# Patient Record
Sex: FEMALE | Race: WHITE | NOT HISPANIC OR LATINO | Employment: UNEMPLOYED | URBAN - NONMETROPOLITAN AREA
[De-identification: names, ages, dates, MRNs, and addresses within clinical notes are randomized per-mention and may not be internally consistent; named-entity substitution may affect disease eponyms.]

---

## 2023-09-14 ENCOUNTER — HOSPITAL ENCOUNTER (EMERGENCY)
Facility: HOSPITAL | Age: 43
Discharge: HOME | End: 2023-09-14
Payer: COMMERCIAL

## 2023-09-14 VITALS
OXYGEN SATURATION: 97 % | TEMPERATURE: 98 F | BODY MASS INDEX: 35 KG/M2 | RESPIRATION RATE: 18 BRPM | SYSTOLIC BLOOD PRESSURE: 132 MMHG | DIASTOLIC BLOOD PRESSURE: 81 MMHG | HEIGHT: 64 IN | WEIGHT: 205 LBS | HEART RATE: 83 BPM

## 2023-09-14 DIAGNOSIS — R10.9 ABDOMINAL PAIN, UNSPECIFIED ABDOMINAL LOCATION: Primary | ICD-10-CM

## 2023-09-14 LAB
BACTERIA #/AREA URNS HPF: ABNORMAL /HPF
BILIRUB UR QL STRIP: NEGATIVE
CLARITY UR: CLEAR
COARSE GRAN CASTS #/AREA URNS LPF: ABNORMAL /LPF
COLOR UR: YELLOW
GLUCOSE UR STRIP-MCNC: >=1000 MG/DL
HCG UR QL IA.RAPID: NEGATIVE
KETONES UR STRIP-SCNC: 15 MG/DL
LEUKOCYTE ESTERASE UR QL STRIP: NEGATIVE
NITRITE UR QL STRIP: NEGATIVE
PH UR STRIP: 6 PH UNITS
PROT UR QL STRIP: NEGATIVE
RBC # UR STRIP: ABNORMAL /UL
RBC #/AREA URNS HPF: ABNORMAL /HPF
SP GR UR STRIP: 1.02
SQUAMOUS #/AREA URNS LPF: ABNORMAL /LPF
UROBILINOGEN UR STRIP-ACNC: 0.2 MG/DL
WBC #/AREA URNS HPF: ABNORMAL /HPF
YEAST #/AREA URNS HPF: ABNORMAL /HPF

## 2023-09-14 PROCEDURE — 99282 EMERGENCY DEPT VISIT SF MDM: CPT

## 2023-09-14 PROCEDURE — 81025 URINE PREGNANCY TEST: CPT | Performed by: REGISTERED NURSE

## 2023-09-14 PROCEDURE — 81001 URINALYSIS AUTO W/SCOPE: CPT | Performed by: REGISTERED NURSE

## 2023-09-14 PROCEDURE — 99283 EMERGENCY DEPT VISIT LOW MDM: CPT | Mod: ,,, | Performed by: REGISTERED NURSE

## 2023-09-14 PROCEDURE — 99283 PR EMERGENCY DEPT VISIT,LEVEL III: ICD-10-PCS | Mod: ,,, | Performed by: REGISTERED NURSE

## 2023-09-14 RX ORDER — MINERAL OIL
180 ENEMA (ML) RECTAL DAILY
COMMUNITY
End: 2024-01-22 | Stop reason: SDUPTHER

## 2023-09-14 RX ORDER — AMITRIPTYLINE HYDROCHLORIDE 10 MG/1
10 TABLET, FILM COATED ORAL NIGHTLY
COMMUNITY
End: 2024-01-22 | Stop reason: SDUPTHER

## 2023-09-14 RX ORDER — BUDESONIDE AND FORMOTEROL FUMARATE DIHYDRATE 160; 4.5 UG/1; UG/1
2 AEROSOL RESPIRATORY (INHALATION) EVERY 12 HOURS
COMMUNITY

## 2023-09-14 RX ORDER — GABAPENTIN 400 MG/1
400 CAPSULE ORAL 3 TIMES DAILY
COMMUNITY
End: 2024-01-22 | Stop reason: SDUPTHER

## 2023-09-14 RX ORDER — ESOMEPRAZOLE MAGNESIUM 40 MG/1
40 GRANULE, DELAYED RELEASE ORAL
COMMUNITY
End: 2024-01-22 | Stop reason: SDUPTHER

## 2023-09-14 RX ORDER — MONTELUKAST SODIUM 10 MG/1
10 TABLET ORAL NIGHTLY
COMMUNITY
End: 2024-01-22 | Stop reason: SDUPTHER

## 2023-09-14 RX ORDER — ATORVASTATIN CALCIUM 10 MG/1
10 TABLET, FILM COATED ORAL DAILY
COMMUNITY
End: 2024-01-22 | Stop reason: SDUPTHER

## 2023-09-14 RX ORDER — LISINOPRIL 5 MG/1
5 TABLET ORAL DAILY
COMMUNITY
End: 2024-01-22 | Stop reason: SDUPTHER

## 2023-09-14 RX ORDER — ESCITALOPRAM OXALATE 20 MG/1
20 TABLET ORAL DAILY
COMMUNITY
End: 2024-01-22 | Stop reason: SDUPTHER

## 2023-09-14 RX ORDER — INSULIN ASPART 100 [IU]/ML
20 INJECTION, SUSPENSION SUBCUTANEOUS
COMMUNITY

## 2023-09-14 RX ORDER — FAMOTIDINE 40 MG/1
40 TABLET, FILM COATED ORAL DAILY
COMMUNITY
End: 2024-01-22 | Stop reason: SDUPTHER

## 2023-09-14 RX ORDER — CETIRIZINE HYDROCHLORIDE 10 MG/1
10 TABLET ORAL DAILY
COMMUNITY
End: 2024-01-22 | Stop reason: SDUPTHER

## 2023-09-14 RX ORDER — SEMAGLUTIDE 1.34 MG/ML
INJECTION, SOLUTION SUBCUTANEOUS
COMMUNITY
End: 2024-01-23

## 2023-09-14 NOTE — ED PROVIDER NOTES
Encounter Date: 2023       History     Chief Complaint   Patient presents with    Abdominal Pain     43 y-old  female received to ED with complaint of pelvic pain that started at around 1530. Patient states that her pain is now resolved. No problems with bowel or bladder. No other complaints voiced.       Review of patient's allergies indicates:   Allergen Reactions    Amoxicillin     Bactrim [sulfamethoxazole-trimethoprim]     Clindamycin     Opioids - morphine analogues     Penicillins     Toradol [ketorolac]      Past Medical History:   Diagnosis Date    Asthma     Diabetes mellitus     Hypertension     Scoliosis      Past Surgical History:   Procedure Laterality Date     SECTION      HYSTERECTOMY       History reviewed. No pertinent family history.  Social History     Tobacco Use    Smoking status: Every Day     Types: Vaping with nicotine    Smokeless tobacco: Never   Substance Use Topics    Alcohol use: Not Currently    Drug use: Never     Review of Systems   Constitutional: Negative.    HENT: Negative.     Eyes: Negative.    Respiratory: Negative.     Cardiovascular: Negative.    Gastrointestinal: Negative.    Endocrine: Negative.    Genitourinary: Negative.    Musculoskeletal: Negative.    Skin: Negative.    Allergic/Immunologic: Negative.    Neurological: Negative.    Hematological: Negative.    Psychiatric/Behavioral: Negative.         Physical Exam     Initial Vitals [23 1710]   BP Pulse Resp Temp SpO2   132/81 83 18 98.4 °F (36.9 °C) 97 %      MAP       --         Physical Exam    Nursing note and vitals reviewed.  Constitutional: She appears well-developed and well-nourished.   HENT:   Head: Normocephalic and atraumatic.   Right Ear: External ear normal.   Left Ear: External ear normal.   Nose: Nose normal.   Mouth/Throat: Oropharynx is clear and moist. No oropharyngeal exudate.   Chronic rupture or right TM. States that she has intermittent drainage.    Eyes: Conjunctivae  are normal.   Neck: Neck supple.   Normal range of motion.  Cardiovascular:  Normal rate, regular rhythm, normal heart sounds and intact distal pulses.           Pulmonary/Chest: Breath sounds normal.   Abdominal: Abdomen is soft. Bowel sounds are normal.   Musculoskeletal:         General: Normal range of motion.      Cervical back: Normal range of motion and neck supple.     Neurological: She is alert and oriented to person, place, and time. She has normal strength. GCS score is 15. GCS eye subscore is 4. GCS verbal subscore is 5. GCS motor subscore is 6.   Skin: Skin is warm and dry. Capillary refill takes less than 2 seconds.   Psychiatric: She has a normal mood and affect. Her behavior is normal. Judgment and thought content normal.         Medical Screening Exam   See Full Note    ED Course   Procedures  Labs Reviewed   URINALYSIS, REFLEX TO URINE CULTURE - Abnormal; Notable for the following components:       Result Value    Glucose, UA >=1000 (*)     Ketones, UA 15 (*)     Blood, UA Trace-Intact (*)     All other components within normal limits   HCG QUALITATIVE URINE   URINALYSIS, MICROSCOPIC          Imaging Results    None          Medications - No data to display  Medical Decision Making  43 y-old  female received to ED with complaint of pelvic pain that started at around 1530. Patient states that her pain is now resolved. No problems with bowel or bladder. No other complaints voiced.       Risk  Risk Details: Patient is pain free on exam. States that her pain resolved before arriving to the ED.                                Clinical Impression:   Final diagnoses:  [R10.9] Abdominal pain, unspecified abdominal location (Primary)        ED Disposition Condition    Discharge Stable          ED Prescriptions    None       Follow-up Information       Follow up With Specialties Details Why Contact Info    Your Regular Doctor  Schedule an appointment as soon as possible for a visit in 2 days As  Nadir Stephenson, SCOTT-C  09/14/23 1835

## 2023-09-14 NOTE — ED TRIAGE NOTES
Presents with c/o lower abdominal pain that started about 3:30.  Pain from bilateral flanks around to pelvis.  Patient states pain was burning like right after you have surgery and stand up.   came in from work and brought her here.  States pain completely stopped while in waiting area about 10-15 minutes ago.

## 2023-09-18 ENCOUNTER — TELEPHONE (OUTPATIENT)
Dept: EMERGENCY MEDICINE | Facility: HOSPITAL | Age: 43
End: 2023-09-18
Payer: COMMERCIAL

## 2023-12-30 ENCOUNTER — HOSPITAL ENCOUNTER (EMERGENCY)
Facility: HOSPITAL | Age: 43
Discharge: HOME | End: 2023-12-30
Attending: EMERGENCY MEDICINE
Payer: COMMERCIAL

## 2023-12-30 VITALS
HEART RATE: 90 BPM | RESPIRATION RATE: 18 BRPM | BODY MASS INDEX: 32.82 KG/M2 | SYSTOLIC BLOOD PRESSURE: 125 MMHG | OXYGEN SATURATION: 98 % | TEMPERATURE: 98 F | HEIGHT: 65 IN | WEIGHT: 197 LBS | DIASTOLIC BLOOD PRESSURE: 85 MMHG

## 2023-12-30 DIAGNOSIS — T78.40XA ALLERGIC REACTION, INITIAL ENCOUNTER: Primary | ICD-10-CM

## 2023-12-30 LAB
BASOPHILS # BLD AUTO: 0.02 K/UL (ref 0–0.2)
BASOPHILS NFR BLD AUTO: 0.2 % (ref 0–1)
DIFFERENTIAL METHOD BLD: ABNORMAL
EOSINOPHIL # BLD AUTO: 0.02 K/UL (ref 0–0.5)
EOSINOPHIL NFR BLD AUTO: 0.2 % (ref 1–4)
ERYTHROCYTE [DISTWIDTH] IN BLOOD BY AUTOMATED COUNT: 12.6 % (ref 11.5–14.5)
HCT VFR BLD AUTO: 48.5 % (ref 38–47)
HGB BLD-MCNC: 16.6 G/DL (ref 12–16)
IMM GRANULOCYTES # BLD AUTO: 0.04 K/UL (ref 0–0.04)
IMM GRANULOCYTES NFR BLD: 0.4 % (ref 0–0.4)
LYMPHOCYTES # BLD AUTO: 2.35 K/UL (ref 1–4.8)
LYMPHOCYTES NFR BLD AUTO: 25.7 % (ref 27–41)
MCH RBC QN AUTO: 30.9 PG (ref 27–31)
MCHC RBC AUTO-ENTMCNC: 34.2 G/DL (ref 32–36)
MCV RBC AUTO: 90.3 FL (ref 80–96)
MONOCYTES # BLD AUTO: 0.58 K/UL (ref 0–0.8)
MONOCYTES NFR BLD AUTO: 6.3 % (ref 2–6)
MPC BLD CALC-MCNC: 10 FL (ref 9.4–12.4)
NEUTROPHILS # BLD AUTO: 6.15 K/UL (ref 1.8–7.7)
NEUTROPHILS NFR BLD AUTO: 67.2 % (ref 53–65)
NRBC # BLD AUTO: 0 X10E3/UL
NRBC, AUTO (.00): 0 %
PLATELET # BLD AUTO: 408 K/UL (ref 150–400)
RBC # BLD AUTO: 5.37 M/UL (ref 4.2–5.4)
WBC # BLD AUTO: 9.16 K/UL (ref 4.5–11)

## 2023-12-30 PROCEDURE — 96374 THER/PROPH/DIAG INJ IV PUSH: CPT

## 2023-12-30 PROCEDURE — 96375 TX/PRO/DX INJ NEW DRUG ADDON: CPT

## 2023-12-30 PROCEDURE — 63600175 PHARM REV CODE 636 W HCPCS: Performed by: EMERGENCY MEDICINE

## 2023-12-30 PROCEDURE — 99284 EMERGENCY DEPT VISIT MOD MDM: CPT | Mod: ,,, | Performed by: EMERGENCY MEDICINE

## 2023-12-30 PROCEDURE — 85025 COMPLETE CBC W/AUTO DIFF WBC: CPT | Performed by: EMERGENCY MEDICINE

## 2023-12-30 PROCEDURE — 99284 EMERGENCY DEPT VISIT MOD MDM: CPT | Mod: 25

## 2023-12-30 PROCEDURE — 25000003 PHARM REV CODE 250: Performed by: EMERGENCY MEDICINE

## 2023-12-30 RX ORDER — DIPHENHYDRAMINE HYDROCHLORIDE 50 MG/ML
25 INJECTION, SOLUTION INTRAMUSCULAR; INTRAVENOUS
Status: COMPLETED | OUTPATIENT
Start: 2023-12-30 | End: 2023-12-30

## 2023-12-30 RX ORDER — METHYLPREDNISOLONE SOD SUCC 125 MG
125 VIAL (EA) INJECTION
Status: COMPLETED | OUTPATIENT
Start: 2023-12-30 | End: 2023-12-30

## 2023-12-30 RX ORDER — HYDROXYZINE HYDROCHLORIDE 25 MG/1
25 TABLET, FILM COATED ORAL EVERY 6 HOURS PRN
Qty: 30 TABLET | Refills: 1 | Status: SHIPPED | OUTPATIENT
Start: 2023-12-30 | End: 2024-01-22

## 2023-12-30 RX ORDER — FAMOTIDINE 10 MG/ML
20 INJECTION INTRAVENOUS
Status: COMPLETED | OUTPATIENT
Start: 2023-12-30 | End: 2023-12-30

## 2023-12-30 RX ORDER — PREDNISONE 20 MG/1
TABLET ORAL
Qty: 12 TABLET | Refills: 0 | Status: SHIPPED | OUTPATIENT
Start: 2023-12-30 | End: 2024-01-22

## 2023-12-30 RX ADMIN — DIPHENHYDRAMINE HYDROCHLORIDE 25 MG: 50 INJECTION INTRAMUSCULAR; INTRAVENOUS at 12:12

## 2023-12-30 RX ADMIN — FAMOTIDINE 20 MG: 10 INJECTION, SOLUTION INTRAVENOUS at 12:12

## 2023-12-30 RX ADMIN — METHYLPREDNISOLONE SODIUM SUCCINATE 125 MG: 125 INJECTION, POWDER, FOR SOLUTION INTRAMUSCULAR; INTRAVENOUS at 12:12

## 2023-12-30 NOTE — ED PROVIDER NOTES
Encounter Date: 2023       History     Chief Complaint   Patient presents with    Rash     42 y/o female with generalized urticaria x 2 - 3 days.  She has had this happen similarly in the past.  She notes no remitting or exacerbating factors.  She says she was out of her allegra, but her  went and got her some.  She says the allegra helped very little.        Review of patient's allergies indicates:   Allergen Reactions    Amoxicillin     Bactrim [sulfamethoxazole-trimethoprim]     Clindamycin     Opioids - morphine analogues     Penicillins     Toradol [ketorolac]      Past Medical History:   Diagnosis Date    Asthma     Diabetes mellitus     Hypertension     Scoliosis      Past Surgical History:   Procedure Laterality Date     SECTION      HYSTERECTOMY       No family history on file.  Social History     Tobacco Use    Smoking status: Every Day     Types: Vaping with nicotine    Smokeless tobacco: Never   Substance Use Topics    Alcohol use: Not Currently    Drug use: Never     Review of Systems   All other systems reviewed and are negative.      Physical Exam     Initial Vitals [23 0027]   BP Pulse Resp Temp SpO2   (!) 141/79 (!) 123 20 97.7 °F (36.5 °C) 99 %      MAP       --         Physical Exam    Nursing note and vitals reviewed.  Constitutional: She appears well-developed and well-nourished.   HENT:   Head: Normocephalic and atraumatic.   Nose: Nose normal.   Mouth/Throat: Oropharynx is clear and moist.   Eyes: Conjunctivae and EOM are normal. Pupils are equal, round, and reactive to light.   Neck: Neck supple.   Normal range of motion.  Cardiovascular:  Regular rhythm and normal heart sounds.           Mildly tachycardic     Pulmonary/Chest: Breath sounds normal.   Abdominal: Abdomen is soft. Bowel sounds are normal.   Musculoskeletal:         General: Normal range of motion.      Cervical back: Normal range of motion and neck supple.     Neurological: She is alert and oriented  to person, place, and time. She has normal strength. GCS score is 15. GCS eye subscore is 4. GCS verbal subscore is 5. GCS motor subscore is 6.   Skin: Capillary refill takes less than 2 seconds.   Large and diffuse generalized urticaria.     Psychiatric: She has a normal mood and affect.         Medical Screening Exam   See Full Note    ED Course   Procedures  Labs Reviewed   CBC WITH DIFFERENTIAL - Abnormal; Notable for the following components:       Result Value    Hemoglobin 16.6 (*)     Hematocrit 48.5 (*)     Platelet Count 408 (*)     Neutrophils % 67.2 (*)     Lymphocytes % 25.7 (*)     Monocytes % 6.3 (*)     Eosinophils % 0.2 (*)     All other components within normal limits   CBC W/ AUTO DIFFERENTIAL    Narrative:     The following orders were created for panel order CBC auto differential.  Procedure                               Abnormality         Status                     ---------                               -----------         ------                     CBC with Differential[[id number]]       Abnormal            Final result                 Please view results for these tests on the individual orders.          Imaging Results    None          Medications   methylPREDNISolone sodium succinate injection 125 mg (125 mg Intravenous Given 12/30/23 0056)   diphenhydrAMINE injection 25 mg (25 mg Intravenous Given 12/30/23 0056)   famotidine (PF) injection 20 mg (20 mg Intravenous Given 12/30/23 0045)     Medical Decision Making  Pruritic urticaria    Ddx:  allergic reaction vs vasculitis vs other    IMPROVED WITH GLUCOCORTICOIDS AND ANTIHISTAMINE.  OUTPATIENT STEROID TAPER AND ANTIHISTAMINE.      Amount and/or Complexity of Data Reviewed  Labs: ordered. Decision-making details documented in ED Course.    Risk  Prescription drug management.                                      Clinical Impression:   Final diagnoses:  [T78.40XA] Allergic reaction, initial encounter (Primary)        ED Disposition  Condition    Discharge Stable          ED Prescriptions    None       Follow-up Information    None          Raphael Syed MD  12/30/23 [id number]

## 2023-12-30 NOTE — DISCHARGE INSTRUCTIONS
TAKE PREDNISONE AND HYDROXYZINE AS DIRECTED.  FOLLOW UP WITH YOUR PRIMARY CARE PROVIDER OR RETURN TO THE EMERGENCY DEPARTMENT IF SYMPTOMS PERSIST OR WORSEN OR OTHERWISE AS NEEDED.

## 2024-01-22 ENCOUNTER — OFFICE VISIT (OUTPATIENT)
Dept: FAMILY MEDICINE | Facility: CLINIC | Age: 44
End: 2024-01-22
Payer: COMMERCIAL

## 2024-01-22 VITALS
WEIGHT: 205.19 LBS | OXYGEN SATURATION: 97 % | RESPIRATION RATE: 18 BRPM | BODY MASS INDEX: 34.19 KG/M2 | DIASTOLIC BLOOD PRESSURE: 84 MMHG | HEART RATE: 103 BPM | SYSTOLIC BLOOD PRESSURE: 125 MMHG | TEMPERATURE: 98 F | HEIGHT: 65 IN

## 2024-01-22 DIAGNOSIS — G43.909 MIGRAINE WITHOUT STATUS MIGRAINOSUS, NOT INTRACTABLE, UNSPECIFIED MIGRAINE TYPE: ICD-10-CM

## 2024-01-22 DIAGNOSIS — K76.0 FATTY LIVER: ICD-10-CM

## 2024-01-22 DIAGNOSIS — Z91.09 ENVIRONMENTAL ALLERGIES: ICD-10-CM

## 2024-01-22 DIAGNOSIS — Z91.018 ALLERGY TO ALPHA-GAL: ICD-10-CM

## 2024-01-22 DIAGNOSIS — G89.29 CHRONIC MIDLINE LOW BACK PAIN WITH BILATERAL SCIATICA: ICD-10-CM

## 2024-01-22 DIAGNOSIS — E11.65 TYPE 2 DIABETES MELLITUS WITH HYPERGLYCEMIA, UNSPECIFIED WHETHER LONG TERM INSULIN USE: Primary | ICD-10-CM

## 2024-01-22 DIAGNOSIS — Z11.4 ENCOUNTER FOR SCREENING FOR HIV: ICD-10-CM

## 2024-01-22 DIAGNOSIS — F41.9 ANXIETY AND DEPRESSION: ICD-10-CM

## 2024-01-22 DIAGNOSIS — M54.41 CHRONIC MIDLINE LOW BACK PAIN WITH BILATERAL SCIATICA: ICD-10-CM

## 2024-01-22 DIAGNOSIS — M54.42 CHRONIC MIDLINE LOW BACK PAIN WITH BILATERAL SCIATICA: ICD-10-CM

## 2024-01-22 DIAGNOSIS — Z11.59 NEED FOR HEPATITIS C SCREENING TEST: ICD-10-CM

## 2024-01-22 DIAGNOSIS — K21.9 GASTROESOPHAGEAL REFLUX DISEASE, UNSPECIFIED WHETHER ESOPHAGITIS PRESENT: ICD-10-CM

## 2024-01-22 DIAGNOSIS — Z85.41 HISTORY OF CERVICAL CANCER: ICD-10-CM

## 2024-01-22 DIAGNOSIS — E04.9 ENLARGED THYROID: ICD-10-CM

## 2024-01-22 DIAGNOSIS — Z12.31 ENCOUNTER FOR SCREENING MAMMOGRAM FOR MALIGNANT NEOPLASM OF BREAST: ICD-10-CM

## 2024-01-22 DIAGNOSIS — F32.A ANXIETY AND DEPRESSION: ICD-10-CM

## 2024-01-22 DIAGNOSIS — Z87.11 HISTORY OF GASTRIC ULCER: ICD-10-CM

## 2024-01-22 PROBLEM — E11.9 DMII (DIABETES MELLITUS, TYPE 2): Status: ACTIVE | Noted: 2024-01-22

## 2024-01-22 LAB
ALBUMIN SERPL BCP-MCNC: 3.5 G/DL (ref 3.5–5)
ALBUMIN/GLOB SERPL: 0.7 {RATIO}
ALP SERPL-CCNC: 124 U/L (ref 37–98)
ALT SERPL W P-5'-P-CCNC: 38 U/L (ref 13–56)
ANION GAP SERPL CALCULATED.3IONS-SCNC: 13 MMOL/L (ref 7–16)
AST SERPL W P-5'-P-CCNC: 19 U/L (ref 15–37)
BASOPHILS # BLD AUTO: 0.01 K/UL (ref 0–0.2)
BASOPHILS NFR BLD AUTO: 0.1 % (ref 0–1)
BILIRUB SERPL-MCNC: 0.4 MG/DL (ref ?–1.2)
BUN SERPL-MCNC: 16 MG/DL (ref 7–18)
BUN/CREAT SERPL: 20 (ref 6–20)
CALCIUM SERPL-MCNC: 9.6 MG/DL (ref 8.5–10.1)
CHLORIDE SERPL-SCNC: 97 MMOL/L (ref 98–107)
CHOLEST SERPL-MCNC: 236 MG/DL (ref 0–200)
CHOLEST/HDLC SERPL: 5.9 {RATIO}
CO2 SERPL-SCNC: 25 MMOL/L (ref 21–32)
CREAT SERPL-MCNC: 0.82 MG/DL (ref 0.55–1.02)
CREAT UR-MCNC: 50 MG/DL (ref 28–219)
DIFFERENTIAL METHOD BLD: ABNORMAL
EGFR (NO RACE VARIABLE) (RUSH/TITUS): 92 ML/MIN/1.73M2
EOSINOPHIL # BLD AUTO: 0.11 K/UL (ref 0–0.5)
EOSINOPHIL NFR BLD AUTO: 1.2 % (ref 1–4)
ERYTHROCYTE [DISTWIDTH] IN BLOOD BY AUTOMATED COUNT: 12.1 % (ref 11.5–14.5)
EST. AVERAGE GLUCOSE BLD GHB EST-MCNC: 338 MG/DL
GLOBULIN SER-MCNC: 5 G/DL (ref 2–4)
GLUCOSE SERPL-MCNC: 363 MG/DL (ref 74–106)
HBA1C MFR BLD HPLC: 13.4 % (ref 4.5–6.6)
HCT VFR BLD AUTO: 46.9 % (ref 38–47)
HDLC SERPL-MCNC: 40 MG/DL (ref 40–60)
HGB BLD-MCNC: 16.1 G/DL (ref 12–16)
IMM GRANULOCYTES # BLD AUTO: 0.03 K/UL (ref 0–0.04)
IMM GRANULOCYTES NFR BLD: 0.3 % (ref 0–0.4)
LDLC SERPL CALC-MCNC: 123 MG/DL
LDLC/HDLC SERPL: 3.1 {RATIO}
LYMPHOCYTES # BLD AUTO: 2.6 K/UL (ref 1–4.8)
LYMPHOCYTES NFR BLD AUTO: 29.1 % (ref 27–41)
MCH RBC QN AUTO: 30.7 PG (ref 27–31)
MCHC RBC AUTO-ENTMCNC: 34.3 G/DL (ref 32–36)
MCV RBC AUTO: 89.3 FL (ref 80–96)
MICROALBUMIN UR-MCNC: 4.1 MG/DL (ref 0–2.8)
MICROALBUMIN/CREAT RATIO PNL UR: 82 MG/G (ref 0–30)
MONOCYTES # BLD AUTO: 0.56 K/UL (ref 0–0.8)
MONOCYTES NFR BLD AUTO: 6.3 % (ref 2–6)
MPC BLD CALC-MCNC: 10.3 FL (ref 9.4–12.4)
NEUTROPHILS # BLD AUTO: 5.61 K/UL (ref 1.8–7.7)
NEUTROPHILS NFR BLD AUTO: 63 % (ref 53–65)
NONHDLC SERPL-MCNC: 196 MG/DL
NRBC # BLD AUTO: 0 X10E3/UL
NRBC, AUTO (.00): 0 %
PLATELET # BLD AUTO: 447 K/UL (ref 150–400)
POTASSIUM SERPL-SCNC: 4.1 MMOL/L (ref 3.5–5.1)
PROT SERPL-MCNC: 8.5 G/DL (ref 6.4–8.2)
RBC # BLD AUTO: 5.25 M/UL (ref 4.2–5.4)
SODIUM SERPL-SCNC: 131 MMOL/L (ref 136–145)
TRIGL SERPL-MCNC: 367 MG/DL (ref 35–150)
TSH SERPL DL<=0.005 MIU/L-ACNC: 2.08 UIU/ML (ref 0.36–3.74)
VLDLC SERPL-MCNC: 73 MG/DL
WBC # BLD AUTO: 8.92 K/UL (ref 4.5–11)

## 2024-01-22 PROCEDURE — 82043 UR ALBUMIN QUANTITATIVE: CPT | Mod: ,,, | Performed by: CLINICAL MEDICAL LABORATORY

## 2024-01-22 PROCEDURE — 99204 OFFICE O/P NEW MOD 45 MIN: CPT | Mod: ,,, | Performed by: STUDENT IN AN ORGANIZED HEALTH CARE EDUCATION/TRAINING PROGRAM

## 2024-01-22 PROCEDURE — 83036 HEMOGLOBIN GLYCOSYLATED A1C: CPT | Mod: ,,, | Performed by: CLINICAL MEDICAL LABORATORY

## 2024-01-22 PROCEDURE — 1159F MED LIST DOCD IN RCRD: CPT | Mod: CPTII,,, | Performed by: STUDENT IN AN ORGANIZED HEALTH CARE EDUCATION/TRAINING PROGRAM

## 2024-01-22 PROCEDURE — 3046F HEMOGLOBIN A1C LEVEL >9.0%: CPT | Mod: CPTII,,, | Performed by: STUDENT IN AN ORGANIZED HEALTH CARE EDUCATION/TRAINING PROGRAM

## 2024-01-22 PROCEDURE — 86803 HEPATITIS C AB TEST: CPT | Mod: ,,, | Performed by: CLINICAL MEDICAL LABORATORY

## 2024-01-22 PROCEDURE — 87389 HIV-1 AG W/HIV-1&-2 AB AG IA: CPT | Mod: ,,, | Performed by: CLINICAL MEDICAL LABORATORY

## 2024-01-22 PROCEDURE — 3074F SYST BP LT 130 MM HG: CPT | Mod: CPTII,,, | Performed by: STUDENT IN AN ORGANIZED HEALTH CARE EDUCATION/TRAINING PROGRAM

## 2024-01-22 PROCEDURE — 82570 ASSAY OF URINE CREATININE: CPT | Mod: ,,, | Performed by: CLINICAL MEDICAL LABORATORY

## 2024-01-22 PROCEDURE — 3079F DIAST BP 80-89 MM HG: CPT | Mod: CPTII,,, | Performed by: STUDENT IN AN ORGANIZED HEALTH CARE EDUCATION/TRAINING PROGRAM

## 2024-01-22 PROCEDURE — 80061 LIPID PANEL: CPT | Mod: ,,, | Performed by: CLINICAL MEDICAL LABORATORY

## 2024-01-22 PROCEDURE — 80050 GENERAL HEALTH PANEL: CPT | Mod: ,,, | Performed by: CLINICAL MEDICAL LABORATORY

## 2024-01-22 PROCEDURE — 3008F BODY MASS INDEX DOCD: CPT | Mod: CPTII,,, | Performed by: STUDENT IN AN ORGANIZED HEALTH CARE EDUCATION/TRAINING PROGRAM

## 2024-01-22 RX ORDER — GABAPENTIN 400 MG/1
400 CAPSULE ORAL 3 TIMES DAILY
Qty: 270 CAPSULE | Refills: 0 | Status: SHIPPED | OUTPATIENT
Start: 2024-01-22 | End: 2024-04-21

## 2024-01-22 RX ORDER — ESOMEPRAZOLE MAGNESIUM 40 MG/1
40 GRANULE, DELAYED RELEASE ORAL
Qty: 90 EACH | Refills: 1 | Status: SHIPPED | OUTPATIENT
Start: 2024-01-22

## 2024-01-22 RX ORDER — IBUPROFEN 800 MG/1
800 TABLET ORAL EVERY 8 HOURS PRN
COMMUNITY
End: 2024-01-22

## 2024-01-22 RX ORDER — AMITRIPTYLINE HYDROCHLORIDE 10 MG/1
10 TABLET, FILM COATED ORAL NIGHTLY
Qty: 90 TABLET | Refills: 0 | Status: SHIPPED | OUTPATIENT
Start: 2024-01-22

## 2024-01-22 RX ORDER — MONTELUKAST SODIUM 10 MG/1
10 TABLET ORAL NIGHTLY
Qty: 90 TABLET | Refills: 0 | Status: SHIPPED | OUTPATIENT
Start: 2024-01-22

## 2024-01-22 RX ORDER — ATORVASTATIN CALCIUM 10 MG/1
10 TABLET, FILM COATED ORAL DAILY
Qty: 90 TABLET | Refills: 0 | Status: SHIPPED | OUTPATIENT
Start: 2024-01-22

## 2024-01-22 RX ORDER — CETIRIZINE HYDROCHLORIDE 10 MG/1
10 TABLET ORAL DAILY
Qty: 90 TABLET | Refills: 0 | Status: SHIPPED | OUTPATIENT
Start: 2024-01-22

## 2024-01-22 RX ORDER — GABAPENTIN 400 MG/1
400 CAPSULE ORAL 3 TIMES DAILY
Qty: 90 CAPSULE | Refills: 0 | Status: SHIPPED | OUTPATIENT
Start: 2024-01-22 | End: 2024-01-22 | Stop reason: SDUPTHER

## 2024-01-22 RX ORDER — MINERAL OIL
180 ENEMA (ML) RECTAL DAILY
Qty: 90 TABLET | Refills: 0 | Status: SHIPPED | OUTPATIENT
Start: 2024-01-22

## 2024-01-22 RX ORDER — FAMOTIDINE 40 MG/1
40 TABLET, FILM COATED ORAL DAILY
Qty: 90 TABLET | Refills: 1 | Status: SHIPPED | OUTPATIENT
Start: 2024-01-22

## 2024-01-22 RX ORDER — LISINOPRIL 5 MG/1
5 TABLET ORAL DAILY
Qty: 90 TABLET | Refills: 1 | Status: SHIPPED | OUTPATIENT
Start: 2024-01-22

## 2024-01-22 RX ORDER — ESCITALOPRAM OXALATE 20 MG/1
20 TABLET ORAL DAILY
Qty: 90 TABLET | Refills: 0 | Status: SHIPPED | OUTPATIENT
Start: 2024-01-22

## 2024-01-23 PROBLEM — Z91.018 ALLERGY TO ALPHA-GAL: Status: ACTIVE | Noted: 2024-01-23

## 2024-01-23 PROBLEM — M54.42 CHRONIC MIDLINE LOW BACK PAIN WITH BILATERAL SCIATICA: Status: ACTIVE | Noted: 2024-01-23

## 2024-01-23 PROBLEM — G89.29 CHRONIC MIDLINE LOW BACK PAIN WITH BILATERAL SCIATICA: Status: ACTIVE | Noted: 2024-01-23

## 2024-01-23 PROBLEM — K76.0 FATTY LIVER: Status: ACTIVE | Noted: 2024-01-23

## 2024-01-23 PROBLEM — K21.9 GASTROESOPHAGEAL REFLUX DISEASE: Status: ACTIVE | Noted: 2024-01-23

## 2024-01-23 PROBLEM — Z87.11 HISTORY OF GASTRIC ULCER: Status: ACTIVE | Noted: 2024-01-23

## 2024-01-23 PROBLEM — M54.41 CHRONIC MIDLINE LOW BACK PAIN WITH BILATERAL SCIATICA: Status: ACTIVE | Noted: 2024-01-23

## 2024-01-23 PROBLEM — G43.909 MIGRAINE WITHOUT STATUS MIGRAINOSUS, NOT INTRACTABLE: Status: ACTIVE | Noted: 2024-01-23

## 2024-01-23 PROBLEM — Z85.41 HISTORY OF CERVICAL CANCER: Status: ACTIVE | Noted: 2024-01-23

## 2024-01-23 LAB
HCV AB SER QL: NORMAL
HIV 1+O+2 AB SERPL QL: NORMAL

## 2024-01-23 RX ORDER — SEMAGLUTIDE 0.68 MG/ML
0.5 INJECTION, SOLUTION SUBCUTANEOUS
Qty: 3 ML | Refills: 0 | Status: SHIPPED | OUTPATIENT
Start: 2024-01-23

## 2024-01-23 NOTE — PROGRESS NOTES
Called and discussed results with patient.   Informed patient about Ozempic and to start 0.5mg weekly. Patient voiced understanding.    [facility]

## 2024-05-01 LAB
LEFT EYE DM RETINOPATHY: POSITIVE
RIGHT EYE DM RETINOPATHY: POSITIVE

## 2024-07-09 ENCOUNTER — HOSPITAL ENCOUNTER (EMERGENCY)
Facility: HOSPITAL | Age: 44
Discharge: HOME | End: 2024-07-10
Payer: COMMERCIAL

## 2024-07-09 DIAGNOSIS — R22.0 LIP SWELLING: ICD-10-CM

## 2024-07-09 DIAGNOSIS — T78.3XXA ANGIOEDEMA, INITIAL ENCOUNTER: Primary | ICD-10-CM

## 2024-07-09 PROCEDURE — 99285 EMERGENCY DEPT VISIT HI MDM: CPT | Mod: ,,, | Performed by: NURSE PRACTITIONER

## 2024-07-09 PROCEDURE — 99285 EMERGENCY DEPT VISIT HI MDM: CPT | Mod: 25

## 2024-07-09 PROCEDURE — 96372 THER/PROPH/DIAG INJ SC/IM: CPT | Performed by: NURSE PRACTITIONER

## 2024-07-09 PROCEDURE — 63600175 PHARM REV CODE 636 W HCPCS: Performed by: NURSE PRACTITIONER

## 2024-07-09 RX ORDER — EPINEPHRINE 1 MG/ML
0.3 INJECTION, SOLUTION, CONCENTRATE INTRAVENOUS ONCE
Status: COMPLETED | OUTPATIENT
Start: 2024-07-09 | End: 2024-07-09

## 2024-07-09 RX ORDER — DEXAMETHASONE SODIUM PHOSPHATE 4 MG/ML
8 INJECTION, SOLUTION INTRA-ARTICULAR; INTRALESIONAL; INTRAMUSCULAR; INTRAVENOUS; SOFT TISSUE
Status: COMPLETED | OUTPATIENT
Start: 2024-07-09 | End: 2024-07-09

## 2024-07-09 RX ORDER — METHYLPREDNISOLONE ACETATE 40 MG/ML
80 INJECTION, SUSPENSION INTRA-ARTICULAR; INTRALESIONAL; INTRAMUSCULAR; SOFT TISSUE
Status: COMPLETED | OUTPATIENT
Start: 2024-07-09 | End: 2024-07-09

## 2024-07-09 RX ADMIN — DEXAMETHASONE SODIUM PHOSPHATE 8 MG: 4 INJECTION, SOLUTION INTRA-ARTICULAR; INTRALESIONAL; INTRAMUSCULAR; INTRAVENOUS; SOFT TISSUE at 11:07

## 2024-07-09 RX ADMIN — METHYLPREDNISOLONE ACETATE 80 MG: 40 INJECTION, SUSPENSION INTRA-ARTICULAR; INTRALESIONAL; INTRAMUSCULAR; SOFT TISSUE at 11:07

## 2024-07-09 RX ADMIN — EPINEPHRINE 0.3 MG: 1 INJECTION, SOLUTION, CONCENTRATE INTRAVENOUS at 11:07

## 2024-07-10 VITALS
BODY MASS INDEX: 33.8 KG/M2 | RESPIRATION RATE: 18 BRPM | DIASTOLIC BLOOD PRESSURE: 65 MMHG | WEIGHT: 198 LBS | TEMPERATURE: 98 F | OXYGEN SATURATION: 98 % | SYSTOLIC BLOOD PRESSURE: 134 MMHG | HEIGHT: 64 IN | HEART RATE: 77 BPM

## 2024-07-10 NOTE — ED NOTES
Dc home with . Verbal and written dc instructions given.   Voiced understanding.   Ambulatory on dc

## 2024-07-10 NOTE — ED TRIAGE NOTES
43 year old female presents to ed with c/o lip swelling that started at appx one pm today.   Patient denies taking any medication for the swelling.   Patient reports multiple allergies but currently does not have an epi pen

## 2024-07-10 NOTE — ED PROVIDER NOTES
Encounter Date: 2024       History     Chief Complaint   Patient presents with    Oral Swelling     Began having swelling of lower lip about 1300 today    The history is provided by the patient. No  was used.     Review of patient's allergies indicates:   Allergen Reactions    Clindamycin Anaphylaxis    Penicillins Swelling    Amoxicillin     Bactrim [sulfamethoxazole-trimethoprim]     Cyclobenzaprine     Morphine     Opioids - morphine analogues     Promethazine     Toradol [ketorolac]     Tramadol      Past Medical History:   Diagnosis Date    Asthma     Diabetes mellitus     Hypertension     Neuropathy     Scoliosis     Unspecified osteoarthritis, unspecified site      Past Surgical History:   Procedure Laterality Date     SECTION      HYSTERECTOMY       No family history on file.  Social History     Tobacco Use    Smoking status: Former     Types: Cigarettes     Start date:      Passive exposure: Past    Smokeless tobacco: Never   Substance Use Topics    Alcohol use: Not Currently    Drug use: Never     Review of Systems   Constitutional:  Positive for appetite change.   HENT:  Positive for trouble swallowing.    Skin:  Positive for rash.        Has swelling of lower lip some throat swelling   All other systems reviewed and are negative.      Physical Exam     Initial Vitals [24 2220]   BP Pulse Resp Temp SpO2   (!) 151/91 83 18 97.7 °F (36.5 °C) 98 %      MAP       --         Physical Exam    Constitutional: She appears well-developed and well-nourished.   HENT:   Head: Normocephalic and atraumatic.   Right Ear: External ear normal.   Left Ear: External ear normal.   Nose: Nose normal.   Mouth/Throat: Oropharynx is clear and moist.   Eyes: EOM are normal. Pupils are equal, round, and reactive to light.   Neck: Neck supple.   Normal range of motion.  Cardiovascular:  Normal rate, regular rhythm, normal heart sounds and intact distal pulses.           Pulmonary/Chest:  Breath sounds normal.   Abdominal: Abdomen is soft. Bowel sounds are normal.   Musculoskeletal:         General: Normal range of motion.      Cervical back: Normal range of motion and neck supple.     Neurological: She is alert and oriented to person, place, and time.   Skin: Skin is warm and dry. Capillary refill takes less than 2 seconds.   Has lower lip swelling         Medical Screening Exam   See Full Note    ED Course   Procedures  Labs Reviewed - No data to display       Imaging Results    None          Medications   EPINEPHrine (PF) injection 0.3 mg (0.3 mg Subcutaneous Given 7/9/24 2305)   dexAMETHasone injection 8 mg (8 mg Intramuscular Given 7/9/24 2304)   methylPREDNISolone acetate injection 80 mg (80 mg Intramuscular Given 7/9/24 2303)     Medical Decision Making  Risk  Prescription drug management.               ED Course as of 07/09/24 2357 Tue Jul 09, 2024 2354 Has reduced lip swelling since medication given, discussed need to follow-up with regular provider for further evaluation [BP]      ED Course User Index  [BP] Mariano Kiser NP                           Clinical Impression:   Final diagnoses:  [T78.3XXA] Angioedema, initial encounter (Primary)  [R22.0] Lip swelling               Mariano Kiser NP  07/09/24 7709

## 2024-07-11 ENCOUNTER — TELEPHONE (OUTPATIENT)
Dept: EMERGENCY MEDICINE | Facility: HOSPITAL | Age: 44
End: 2024-07-11
Payer: COMMERCIAL

## 2024-08-01 ENCOUNTER — OFFICE VISIT (OUTPATIENT)
Dept: FAMILY MEDICINE | Facility: CLINIC | Age: 44
End: 2024-08-01
Payer: COMMERCIAL

## 2024-08-01 VITALS
HEIGHT: 64 IN | RESPIRATION RATE: 18 BRPM | OXYGEN SATURATION: 98 % | TEMPERATURE: 98 F | BODY MASS INDEX: 33.12 KG/M2 | HEART RATE: 87 BPM | SYSTOLIC BLOOD PRESSURE: 134 MMHG | WEIGHT: 194 LBS | DIASTOLIC BLOOD PRESSURE: 77 MMHG

## 2024-08-01 DIAGNOSIS — E11.9 TYPE 2 DIABETES MELLITUS WITHOUT COMPLICATION, UNSPECIFIED WHETHER LONG TERM INSULIN USE: Primary | ICD-10-CM

## 2024-08-01 DIAGNOSIS — L50.9 URTICARIA: ICD-10-CM

## 2024-08-01 LAB — GLUCOSE SERPL-MCNC: 225 MG/DL (ref 70–110)

## 2024-08-01 RX ORDER — PREDNISONE 20 MG/1
20 TABLET ORAL DAILY
Qty: 5 TABLET | Refills: 0 | Status: SHIPPED | OUTPATIENT
Start: 2024-08-01 | End: 2024-08-06

## 2024-08-01 RX ORDER — INSULIN ASPART 100 [IU]/ML
20 INJECTION, SUSPENSION SUBCUTANEOUS 2 TIMES DAILY
Qty: 12 ML | Refills: 2 | Status: SHIPPED | OUTPATIENT
Start: 2024-08-01 | End: 2024-10-30

## 2024-08-01 RX ORDER — HYDROXYZINE HYDROCHLORIDE 25 MG/1
25 TABLET, FILM COATED ORAL 3 TIMES DAILY
Qty: 42 TABLET | Refills: 0 | Status: SHIPPED | OUTPATIENT
Start: 2024-08-01 | End: 2024-08-15

## 2024-08-02 NOTE — PROGRESS NOTES
"  Subjective:       Patient ID: Latha Palm is a 44 y.o. female.    Chief Complaint: Rash (Room 3 patient c/o large red spots all over body for a month, patient stated her nerves and joints are  hurting, pain is 6-7 on a 0-10 pain scale )    Patient is a 44 year old female who presents to clinic for a chronic rash. Patient has a PMHx of poorly controlled DMII, depression, environmental allergies, GERD, hysterectomy and fatty liver. Patient states she has had a pruritic rash "all over her body" for over a year. The patient is reported prescribed Allegra and Zyrtec but has not taken them in over 4 months due to lack of financial resources. The patient reportedly lives in a house without air conditioning and does not have running water or electricity. The patient's  is in the room and he does not apparently have any skin issues. The patient's diabetes is not controlled, last HgA1c was in January 2024 at 13.4%. She has not taken insulin in over 4 months. The patient reports occasional headaches and admits to drinking a fair amount of carbonated sweet beverages. She was encouraged in increase her water intake, however the patient stated she cannot do this because she doesn't like the taste of water. Patient denies chest pain, SOB or abdominal pain. Patient is encouraged to follow up for reevaluation in 7 to 14 days.      Current Outpatient Medications:     amitriptyline (ELAVIL) 10 MG tablet, Take 1 tablet (10 mg total) by mouth every evening., Disp: 90 tablet, Rfl: 0    atorvastatin (LIPITOR) 10 MG tablet, Take 1 tablet (10 mg total) by mouth once daily., Disp: 90 tablet, Rfl: 0    budesonide-formoterol 160-4.5 mcg (SYMBICORT) 160-4.5 mcg/actuation HFAA, Inhale 2 puffs into the lungs every 12 (twelve) hours. Controller, Disp: , Rfl:     empagliflozin (JARDIANCE) 25 mg tablet, Take 1 tablet (25 mg total) by mouth once daily., Disp: 90 tablet, Rfl: 0    EScitalopram oxalate (LEXAPRO) 20 MG tablet, Take " 1 tablet (20 mg total) by mouth once daily., Disp: 90 tablet, Rfl: 0    esomeprazole (NEXIUM) 40 mg GrPS, Take 40 mg by mouth before breakfast., Disp: 90 each, Rfl: 1    famotidine (PEPCID) 40 MG tablet, Take 1 tablet (40 mg total) by mouth once daily., Disp: 90 tablet, Rfl: 1    lisinopriL (PRINIVIL,ZESTRIL) 5 MG tablet, Take 1 tablet (5 mg total) by mouth once daily., Disp: 90 tablet, Rfl: 1    montelukast (SINGULAIR) 10 mg tablet, Take 1 tablet (10 mg total) by mouth every evening., Disp: 90 tablet, Rfl: 0    semaglutide (OZEMPIC) 0.25 mg or 0.5 mg (2 mg/3 mL) pen injector, Inject 0.5 mg into the skin every 7 days., Disp: 3 mL, Rfl: 0    gabapentin (NEURONTIN) 400 MG capsule, Take 1 capsule (400 mg total) by mouth 3 (three) times daily., Disp: 270 capsule, Rfl: 0    hydrOXYzine HCL (ATARAX) 25 MG tablet, Take 1 tablet (25 mg total) by mouth 3 (three) times daily. for 14 days, Disp: 42 tablet, Rfl: 0    insulin aspart protamine-insulin aspart (NOVOLOG 70/30) 100 unit/mL (70-30) InPn pen, Inject 20 Units into the skin 2 (two) times a day., Disp: 12 mL, Rfl: 2    predniSONE (DELTASONE) 20 MG tablet, Take 1 tablet (20 mg total) by mouth once daily. for 5 days, Disp: 5 tablet, Rfl: 0    Review of patient's allergies indicates:   Allergen Reactions    Clindamycin Anaphylaxis    Penicillins Swelling    Amoxicillin     Bactrim [sulfamethoxazole-trimethoprim]     Cyclobenzaprine     Morphine     Opioids - morphine analogues     Promethazine     Toradol [ketorolac]     Tramadol        Past Medical History:   Diagnosis Date    Asthma     Diabetes mellitus     Hypertension     Neuropathy     Scoliosis     Unspecified osteoarthritis, unspecified site        Past Surgical History:   Procedure Laterality Date     SECTION      HYSTERECTOMY         History reviewed. No pertinent family history.    Social History     Tobacco Use    Smoking status: Former     Types: Cigarettes     Start date:  1999     Passive exposure: Past    Smokeless tobacco: Never   Substance Use Topics    Alcohol use: Not Currently    Drug use: Never       Review of Systems   Constitutional:  Negative for activity change and appetite change.   HENT:  Positive for dental problem. Negative for hearing loss and mouth dryness.    Eyes:  Negative for discharge and itching.   Respiratory:  Negative for cough, choking, shortness of breath, wheezing and stridor.    Cardiovascular:  Negative for chest pain, leg swelling and claudication.   Gastrointestinal:  Negative for abdominal distention, abdominal pain, constipation, diarrhea, nausea and vomiting.   Endocrine: Negative for cold intolerance and heat intolerance.   Genitourinary:  Negative for bladder incontinence and difficulty urinating.   Musculoskeletal:  Negative for arthralgias, back pain, joint swelling, leg pain and joint deformity.   Integumentary:  Positive for color change and rash. Negative for pallor and wound.   Allergic/Immunologic: Positive for environmental allergies, food allergies and immunocompromised state.   Neurological:  Negative for dizziness, seizures, light-headedness, numbness, headaches, memory loss and coordination difficulties.   Psychiatric/Behavioral:  Positive for depressed mood. Negative for agitation, confusion, dysphoric mood and self-injury. The patient is not nervous/anxious and is not hyperactive.    All other systems reviewed and are negative.        Current Medications:   Medication List with Changes/Refills   New Medications    HYDROXYZINE HCL (ATARAX) 25 MG TABLET    Take 1 tablet (25 mg total) by mouth 3 (three) times daily. for 14 days       Start Date: 8/1/2024  End Date: 8/15/2024    PREDNISONE (DELTASONE) 20 MG TABLET    Take 1 tablet (20 mg total) by mouth once daily. for 5 days       Start Date: 8/1/2024  End Date: 8/6/2024   Current Medications    AMITRIPTYLINE (ELAVIL) 10 MG TABLET    Take 1 tablet (10 mg total) by mouth every  evening.       Start Date: 1/22/2024 End Date: --    ATORVASTATIN (LIPITOR) 10 MG TABLET    Take 1 tablet (10 mg total) by mouth once daily.       Start Date: 1/22/2024 End Date: --    BUDESONIDE-FORMOTEROL 160-4.5 MCG (SYMBICORT) 160-4.5 MCG/ACTUATION HFAA    Inhale 2 puffs into the lungs every 12 (twelve) hours. Controller       Start Date: --        End Date: --    EMPAGLIFLOZIN (JARDIANCE) 25 MG TABLET    Take 1 tablet (25 mg total) by mouth once daily.       Start Date: 1/23/2024 End Date: --    ESCITALOPRAM OXALATE (LEXAPRO) 20 MG TABLET    Take 1 tablet (20 mg total) by mouth once daily.       Start Date: 1/22/2024 End Date: --    ESOMEPRAZOLE (NEXIUM) 40 MG GRPS    Take 40 mg by mouth before breakfast.       Start Date: 1/22/2024 End Date: --    FAMOTIDINE (PEPCID) 40 MG TABLET    Take 1 tablet (40 mg total) by mouth once daily.       Start Date: 1/22/2024 End Date: --    GABAPENTIN (NEURONTIN) 400 MG CAPSULE    Take 1 capsule (400 mg total) by mouth 3 (three) times daily.       Start Date: 1/22/2024 End Date: 4/21/2024    LISINOPRIL (PRINIVIL,ZESTRIL) 5 MG TABLET    Take 1 tablet (5 mg total) by mouth once daily.       Start Date: 1/22/2024 End Date: --    MONTELUKAST (SINGULAIR) 10 MG TABLET    Take 1 tablet (10 mg total) by mouth every evening.       Start Date: 1/22/2024 End Date: --    SEMAGLUTIDE (OZEMPIC) 0.25 MG OR 0.5 MG (2 MG/3 ML) PEN INJECTOR    Inject 0.5 mg into the skin every 7 days.       Start Date: 1/23/2024 End Date: --   Changed and/or Refilled Medications    Modified Medication Previous Medication    INSULIN ASPART PROTAMINE-INSULIN ASPART (NOVOLOG 70/30) 100 UNIT/ML (70-30) INPN PEN insulin aspart protamine-insulin aspart (NOVOLOG 70/30) 100 unit/mL (70-30) InPn pen       Inject 20 Units into the skin 2 (two) times a day.    Inject 20 Units into the skin 3 (three) times daily before meals.       Start Date: 8/1/2024  End Date: 10/30/2024    Start Date: --        End Date: 8/1/2024  "  Discontinued Medications    CETIRIZINE (ZYRTEC) 10 MG TABLET    Take 1 tablet (10 mg total) by mouth once daily.       Start Date: 1/22/2024 End Date: 8/1/2024    FEXOFENADINE (ALLEGRA) 180 MG TABLET    Take 1 tablet (180 mg total) by mouth once daily.       Start Date: 1/22/2024 End Date: 8/1/2024    INSULIN DETEMIR U-100 (LEVEMIR) 100 UNIT/ML INJECTION    Inject 30 Units into the skin every evening.       Start Date: --        End Date: 8/1/2024            Objective:        Vitals:    08/01/24 1453   BP: 134/77   BP Location: Left arm   Patient Position: Sitting   BP Method: Medium (Automatic)   Pulse: 87   Resp: 18   Temp: 97.9 °F (36.6 °C)   TempSrc: Oral   SpO2: 98%   Weight: 88 kg (194 lb)   Height: 5' 4" (1.626 m)       Physical Exam  Vitals and nursing note reviewed. Exam conducted with a chaperone present.   Constitutional:       General: She is awake. She is not in acute distress.     Appearance: She is obese. She is ill-appearing. She is not toxic-appearing or diaphoretic.   HENT:      Head: Normocephalic and atraumatic.      Mouth/Throat:      Dentition: Abnormal dentition. Dental caries present.      Pharynx: Oropharynx is clear.   Eyes:      Conjunctiva/sclera: Conjunctivae normal.   Cardiovascular:      Rate and Rhythm: Normal rate and regular rhythm.      Pulses: Normal pulses.      Heart sounds: Normal heart sounds.   Pulmonary:      Effort: Pulmonary effort is normal. No respiratory distress.      Breath sounds: Normal breath sounds. No wheezing.   Musculoskeletal:      Right lower leg: No edema.      Left lower leg: No edema.   Skin:     General: Skin is warm and dry.      Findings: Rash present. Rash is urticarial.      Comments: Rash on arms and torso primarily in various sizes, circular in nature that pelon with pressure   Neurological:      Mental Status: She is alert and oriented to person, place, and time.   Psychiatric:         Mood and Affect: Mood normal.         Behavior: Behavior " normal. Behavior is cooperative.         Thought Content: Thought content normal.             Lab Results   Component Value Date    WBC 8.92 01/22/2024    HGB 16.1 (H) 01/22/2024    HCT 46.9 01/22/2024     (H) 01/22/2024    CHOL 236 (H) 01/22/2024    TRIG 367 (H) 01/22/2024    HDL 40 01/22/2024    ALT 38 01/22/2024    AST 19 01/22/2024     (L) 01/22/2024    K 4.1 01/22/2024    CL 97 (L) 01/22/2024    CREATININE 0.82 01/22/2024    BUN 16 01/22/2024    CO2 25 01/22/2024    TSH 2.080 01/22/2024    HGBA1C 13.4 (H) 01/22/2024    MICROALBUR 4.1 (H) 01/22/2024      Assessment:       1. Type 2 diabetes mellitus without complication, unspecified whether long term insulin use    2. Urticaria        Plan:         Problem List Items Addressed This Visit          Derm    Urticaria     Diffuse patchy rash on torso and arms for over one year  Patient dose not have running water in their house  Patient states she showers a couple times per week  Increased hygiene encouraged  Patient reportedly lives in house without air conditioning  Patient has not taken prescribed allegra and zyrtec in 4 months due to lack of financial resources   Prescribed Hydroxyzine 25 mg, three times per day for 14 days  Prednisone 20mg PO daily for 5 days  Follow up in clinic in 7 to 14 days         Relevant Medications    hydrOXYzine HCL (ATARAX) 25 MG tablet    predniSONE (DELTASONE) 20 MG tablet       Endocrine    DMII (diabetes mellitus, type 2) - Primary     Poorly controlled  Last HgA1c 13.4 % in 1/2024  Medication non-compliance due to lack of financial resources  Patient admits to taking zero medication for the past 4 months  Reordered Insulin 70/30 today  POCT glucose in clinic 225  Patient already has PCP but will discuss options  Return to clinic in 7 to 14 days, will attempt to get new HgA1c and other labs  Will continue to monitor         Relevant Medications    insulin aspart protamine-insulin aspart (NOVOLOG 70/30) 100 unit/mL  (70-30) InPn pen    Other Relevant Orders    POCT Glucose, Hand-Held Device (Completed)         Follow up in about 2 weeks (around 8/15/2024).    Shalom Russell MD     Instructed patient that if symptoms fail to improve or worsen patient should seek immediate medical attention or report to the nearest emergency department. Patient expressed verbal agreement and understanding to this plan of care.

## 2024-08-02 NOTE — ASSESSMENT & PLAN NOTE
Diffuse patchy rash on torso and arms for over one year  Patient dose not have running water in their house  Patient states she showers a couple times per week  Increased hygiene encouraged  Patient reportedly lives in house without air conditioning  Patient has not taken prescribed allegra and zyrtec in 4 months due to lack of financial resources   Prescribed Hydroxyzine 25 mg, three times per day for 14 days  Prednisone 20mg PO daily for 5 days  Follow up in clinic in 7 to 14 days  
Poorly controlled  Last HgA1c 13.4 % in 1/2024  Medication non-compliance due to lack of financial resources  Patient admits to taking zero medication for the past 4 months  Reordered Insulin 70/30 today  POCT glucose in clinic 225  Patient already has PCP but will discuss options  Return to clinic in 7 to 14 days, will attempt to get new HgA1c and other labs  Will continue to monitor  
Ambulatory

## 2024-09-06 ENCOUNTER — TELEPHONE (OUTPATIENT)
Dept: ORTHOPEDICS | Facility: CLINIC | Age: 44
End: 2024-09-06
Payer: COMMERCIAL

## 2024-09-06 NOTE — TELEPHONE ENCOUNTER
----- Message from Liz Valerio sent at 9/5/2024 11:25 AM CDT -----  Who Called: Latha Palm    Caller is requesting appointment.       Symptoms:rt foot fx       Preferred Method of Contact: Phone Call  Patient's Preferred Phone Number on File: [phone]   Best Call Back Number, if different:  Additional Information: Fili Caicedo 8/31 - rt foot fx

## 2024-09-06 NOTE — TELEPHONE ENCOUNTER
Attempted to call patient. No answer.  I was going to offer her an appt with Dr. Davidson for 09/09/24 @ 3:30p.m.

## 2024-09-09 ENCOUNTER — HOSPITAL ENCOUNTER (OUTPATIENT)
Dept: RADIOLOGY | Facility: HOSPITAL | Age: 44
Discharge: HOME | End: 2024-09-09
Attending: ORTHOPAEDIC SURGERY
Payer: COMMERCIAL

## 2024-09-09 ENCOUNTER — OFFICE VISIT (OUTPATIENT)
Dept: ORTHOPEDICS | Facility: CLINIC | Age: 44
End: 2024-09-09
Payer: COMMERCIAL

## 2024-09-09 DIAGNOSIS — M54.6 THORACIC BACK PAIN, UNSPECIFIED BACK PAIN LATERALITY, UNSPECIFIED CHRONICITY: ICD-10-CM

## 2024-09-09 DIAGNOSIS — M79.671 RIGHT FOOT PAIN: ICD-10-CM

## 2024-09-09 DIAGNOSIS — M79.671 RIGHT FOOT PAIN: Primary | ICD-10-CM

## 2024-09-09 DIAGNOSIS — S92.351A CLOSED DISPLACED FRACTURE OF FIFTH METATARSAL BONE OF RIGHT FOOT, INITIAL ENCOUNTER: Primary | ICD-10-CM

## 2024-09-09 PROCEDURE — 4010F ACE/ARB THERAPY RXD/TAKEN: CPT | Mod: CPTII,,, | Performed by: ORTHOPAEDIC SURGERY

## 2024-09-09 PROCEDURE — 3046F HEMOGLOBIN A1C LEVEL >9.0%: CPT | Mod: CPTII,,, | Performed by: ORTHOPAEDIC SURGERY

## 2024-09-09 PROCEDURE — 28470 CLTX METATARSAL FX WO MNP EA: CPT | Mod: PBBFAC,RT | Performed by: ORTHOPAEDIC SURGERY

## 2024-09-09 PROCEDURE — 99213 OFFICE O/P EST LOW 20 MIN: CPT | Mod: PBBFAC,25 | Performed by: ORTHOPAEDIC SURGERY

## 2024-09-09 PROCEDURE — 3066F NEPHROPATHY DOC TX: CPT | Mod: CPTII,,, | Performed by: ORTHOPAEDIC SURGERY

## 2024-09-09 PROCEDURE — 1159F MED LIST DOCD IN RCRD: CPT | Mod: CPTII,,, | Performed by: ORTHOPAEDIC SURGERY

## 2024-09-09 PROCEDURE — 73630 X-RAY EXAM OF FOOT: CPT | Mod: TC,RT

## 2024-09-09 PROCEDURE — 99999 PR PBB SHADOW E&M-EST. PATIENT-LVL III: CPT | Mod: PBBFAC,,, | Performed by: ORTHOPAEDIC SURGERY

## 2024-09-09 PROCEDURE — 73630 X-RAY EXAM OF FOOT: CPT | Mod: 26,RT,, | Performed by: ORTHOPAEDIC SURGERY

## 2024-09-09 PROCEDURE — 99203 OFFICE O/P NEW LOW 30 MIN: CPT | Mod: S$PBB,57,, | Performed by: ORTHOPAEDIC SURGERY

## 2024-09-09 PROCEDURE — 28470 CLTX METATARSAL FX WO MNP EA: CPT | Mod: S$PBB,RT,, | Performed by: ORTHOPAEDIC SURGERY

## 2024-09-09 PROCEDURE — 72070 X-RAY EXAM THORAC SPINE 2VWS: CPT | Mod: TC

## 2024-09-09 PROCEDURE — 3060F POS MICROALBUMINURIA REV: CPT | Mod: CPTII,,, | Performed by: ORTHOPAEDIC SURGERY

## 2024-09-09 NOTE — PROGRESS NOTES
Clinic Note        CC:   Chief Complaint   Patient presents with    Right Foot - Pain        Principal problem: Closed displaced fracture of fifth metatarsal bone of right foot, initial encounter [J28.830R]     REASON FOR VISIT:       HISTORY:  44-year-old female who has diabetic neuropathy in her feet who sustained a injury to her right foot when she was moving some furniture felt a pop.  She has an acute fracture of the 5th metatarsal slight displacement of the metaphyseal diaphyseal region she has a apparent healing fracture of the base of the 4th metatarsal      PAST MEDICAL HISTORY:   Past Medical History:   Diagnosis Date    Asthma     Diabetes mellitus     Hypertension     Neuropathy     Scoliosis     Unspecified osteoarthritis, unspecified site           PAST SURGICAL HISTORY:   Past Surgical History:   Procedure Laterality Date     SECTION      HYSTERECTOMY            ALLERGIES:   Review of patient's allergies indicates:   Allergen Reactions    Clindamycin Anaphylaxis    Penicillins Swelling    Amoxicillin     Bactrim [sulfamethoxazole-trimethoprim]     Cyclobenzaprine     Morphine     Opioids - morphine analogues     Promethazine     Toradol [ketorolac]     Tramadol         MEDICATIONS :    Current Outpatient Medications:     amitriptyline (ELAVIL) 10 MG tablet, Take 1 tablet (10 mg total) by mouth every evening., Disp: 90 tablet, Rfl: 0    atorvastatin (LIPITOR) 10 MG tablet, Take 1 tablet (10 mg total) by mouth once daily., Disp: 90 tablet, Rfl: 0    budesonide-formoterol 160-4.5 mcg (SYMBICORT) 160-4.5 mcg/actuation HFAA, Inhale 2 puffs into the lungs every 12 (twelve) hours. Controller, Disp: , Rfl:     empagliflozin (JARDIANCE) 25 mg tablet, Take 1 tablet (25 mg total) by mouth once daily., Disp: 90 tablet, Rfl: 0    EScitalopram oxalate (LEXAPRO) 20 MG tablet, Take 1 tablet (20 mg total) by mouth once daily., Disp: 90 tablet, Rfl: 0    esomeprazole (NEXIUM) 40 mg GrPS, Take 40 mg  by mouth before breakfast., Disp: 90 each, Rfl: 1    famotidine (PEPCID) 40 MG tablet, Take 1 tablet (40 mg total) by mouth once daily., Disp: 90 tablet, Rfl: 1    gabapentin (NEURONTIN) 400 MG capsule, Take 1 capsule (400 mg total) by mouth 3 (three) times daily., Disp: 270 capsule, Rfl: 0    insulin aspart protamine-insulin aspart (NOVOLOG 70/30) 100 unit/mL (70-30) InPn pen, Inject 20 Units into the skin 2 (two) times a day., Disp: 12 mL, Rfl: 2    lisinopriL (PRINIVIL,ZESTRIL) 5 MG tablet, Take 1 tablet (5 mg total) by mouth once daily., Disp: 90 tablet, Rfl: 1    montelukast (SINGULAIR) 10 mg tablet, Take 1 tablet (10 mg total) by mouth every evening., Disp: 90 tablet, Rfl: 0    semaglutide (OZEMPIC) 0.25 mg or 0.5 mg (2 mg/3 mL) pen injector, Inject 0.5 mg into the skin every 7 days., Disp: 3 mL, Rfl: 0     SOCIAL HISTORY:   Social History     Socioeconomic History    Marital status:     Number of children: 4   Tobacco Use    Smoking status: Former     Types: Cigarettes     Start date: 1999     Passive exposure: Past    Smokeless tobacco: Never   Substance and Sexual Activity    Alcohol use: Not Currently    Drug use: Never    Sexual activity: Yes     Partners: Male     Birth control/protection: See Surgical Hx   Social History Narrative    Lives at home with  and 1 child          FAMILY HISTORY: No family history on file.       PHYSICAL EXAM:  In general, this is a well-developed, well-nourished female . The patient is alert, oriented and cooperative.      HEENT:  Normocephalic, atraumatic.  Extraocular movements are intact bilaterally.  The oropharynx is benign.       NECK:  Nontender with good range of motion.      LUNGS:  Clear to auscultation bilaterally.      HEART:  Demonstrates a regular rate and rhythm.  No murmurs appreciated.      ABDOMEN:  Soft, non-tender, non-distended.        EXTREMITIES:  Right lower extremity she does move her toes she does have palpable dorsalis pedis pulse she  has some decreased sensation distal to her ankle.  She has some swelling over lateral aspect of the foot.  Some all tendon 5th metatarsal base.  Swelling noted.  No crepitus      RADIOGRAPHIC FINDINGS:  X-rays show fracture of the 5th metatarsal that is acute with a age indeterminate fracture of the metatarsal the edges rounded at the fracture site.  No displacement      IMPRESSION: Closed displaced fracture of fifth metatarsal bone of right foot, initial encounter         PLAN:  At this time we discussed treatment options I will keep her in a boot she has minimal displacement 5th metatarsal fish he was also she does not show any healing we did discuss possible surgical intervention she does have with the feet if I can avoid having to operate we will.  However she does not start to heal the fracture and several weeks she may after do surgical intervention with a an ORIF of the 5th metatarsal I will check her back with films in 2 weeks.  There are no Patient Instructions on file for this visit.      Follow up in about 2 weeks (around 9/23/2024).         Wilton Davidson      (Subject to voice recognition error, transcription service not allowed)                                   Radiology Interpretation        Patient Name: Latha Palm  Date: 9/9/2024  YOB: 1980  MRN# [id number]        ORDERING DIAGNOSIS:    Encounter Diagnosis   Name Primary?    Closed displaced fracture of fifth metatarsal bone of right foot, initial encounter Yes        Three views AP lateral oblique right foot skeletally mature individual there is a fracture of the 5th metatarsal in the metaphyseal diaphyseal region there is some slight displacement noted.  Appears to be a age indeterminate fracture with some callus forming of the base of the 4th metatarsal without displacement.  No bony lesions.  Impression fracture 4th and 5th with the acute of the 5th metatarsal               Wilton Davidson MD

## 2024-09-20 DIAGNOSIS — S92.351A CLOSED DISPLACED FRACTURE OF FIFTH METATARSAL BONE OF RIGHT FOOT, INITIAL ENCOUNTER: Primary | ICD-10-CM

## 2024-09-23 ENCOUNTER — CLINICAL SUPPORT (OUTPATIENT)
Dept: CARDIOLOGY | Facility: CLINIC | Age: 44
End: 2024-09-23
Payer: COMMERCIAL

## 2024-09-23 ENCOUNTER — HOSPITAL ENCOUNTER (OUTPATIENT)
Dept: RADIOLOGY | Facility: HOSPITAL | Age: 44
Discharge: HOME | End: 2024-09-23
Attending: ORTHOPAEDIC SURGERY
Payer: COMMERCIAL

## 2024-09-23 ENCOUNTER — OFFICE VISIT (OUTPATIENT)
Dept: ORTHOPEDICS | Facility: CLINIC | Age: 44
End: 2024-09-23
Payer: COMMERCIAL

## 2024-09-23 DIAGNOSIS — Z01.810 PRE-OPERATIVE CARDIOVASCULAR EXAMINATION: ICD-10-CM

## 2024-09-23 DIAGNOSIS — S92.351A CLOSED DISPLACED FRACTURE OF FIFTH METATARSAL BONE OF RIGHT FOOT, INITIAL ENCOUNTER: Primary | ICD-10-CM

## 2024-09-23 DIAGNOSIS — Z01.812 PRE-OPERATIVE LABORATORY EXAMINATION: ICD-10-CM

## 2024-09-23 DIAGNOSIS — S92.351A CLOSED DISPLACED FRACTURE OF FIFTH METATARSAL BONE OF RIGHT FOOT, INITIAL ENCOUNTER: ICD-10-CM

## 2024-09-23 PROCEDURE — 99999 PR PBB SHADOW E&M-EST. PATIENT-LVL II: CPT | Mod: PBBFAC,,,

## 2024-09-23 PROCEDURE — 93010 ELECTROCARDIOGRAM REPORT: CPT | Mod: S$PBB,,, | Performed by: STUDENT IN AN ORGANIZED HEALTH CARE EDUCATION/TRAINING PROGRAM

## 2024-09-23 PROCEDURE — 99213 OFFICE O/P EST LOW 20 MIN: CPT | Mod: PBBFAC,25 | Performed by: ORTHOPAEDIC SURGERY

## 2024-09-23 PROCEDURE — 99024 POSTOP FOLLOW-UP VISIT: CPT | Mod: ,,, | Performed by: ORTHOPAEDIC SURGERY

## 2024-09-23 PROCEDURE — 3060F POS MICROALBUMINURIA REV: CPT | Mod: CPTII,,, | Performed by: ORTHOPAEDIC SURGERY

## 2024-09-23 PROCEDURE — 4010F ACE/ARB THERAPY RXD/TAKEN: CPT | Mod: CPTII,,, | Performed by: ORTHOPAEDIC SURGERY

## 2024-09-23 PROCEDURE — 3046F HEMOGLOBIN A1C LEVEL >9.0%: CPT | Mod: CPTII,,, | Performed by: ORTHOPAEDIC SURGERY

## 2024-09-23 PROCEDURE — 1159F MED LIST DOCD IN RCRD: CPT | Mod: CPTII,,, | Performed by: ORTHOPAEDIC SURGERY

## 2024-09-23 PROCEDURE — 93005 ELECTROCARDIOGRAM TRACING: CPT | Mod: PBBFAC | Performed by: STUDENT IN AN ORGANIZED HEALTH CARE EDUCATION/TRAINING PROGRAM

## 2024-09-23 PROCEDURE — 99999 PR PBB SHADOW E&M-EST. PATIENT-LVL III: CPT | Mod: PBBFAC,,, | Performed by: ORTHOPAEDIC SURGERY

## 2024-09-23 PROCEDURE — 99212 OFFICE O/P EST SF 10 MIN: CPT | Mod: PBBFAC,25

## 2024-09-23 PROCEDURE — 3066F NEPHROPATHY DOC TX: CPT | Mod: CPTII,,, | Performed by: ORTHOPAEDIC SURGERY

## 2024-09-23 PROCEDURE — 73630 X-RAY EXAM OF FOOT: CPT | Mod: TC,RT

## 2024-09-23 PROCEDURE — 73630 X-RAY EXAM OF FOOT: CPT | Mod: 26,RT,, | Performed by: ORTHOPAEDIC SURGERY

## 2024-09-23 NOTE — PATIENT INSTRUCTIONS
Your surgery is scheduled at [facility] in [place] for 2024    Pre-Op Testin2024    ___x____ Lab (Clinic Lab 1st Floor)  _______ Chest X-ray ([facility] 1st Floor)  ___x____ EKG (Clinic 2nd Floor)    *Our office will contact you the day before surgery to give you  the arrival time.    *Do NOT eat or drink anything after midnight the night before your surgery.    *Bring all medications in their original bottles.    *Bring anything you may need for an overnight stay.     *Bathe with Hibiclens the night or morning before surgery.    *The morning of your surgery ONLY take blood pressure medications, heart medications, medications for acid reflux, and thyroid medications (the morning dose only).  *Take these medications with a sip of water.    *Do not take Insulin or Diabetic Medications the night before or the morning of your surgery, unless directed otherwise.  Stop Ozempic 7 days prior to surgery.     *Be sure that you have stopped blood thinners at the appropriate time, as instructed.  (_____ days prior to surgery)    *Bring your C-Pap machine if you have one.    *All jewelry and piercings MUST be removed prior to surgery.    *False eye lashes must be removed prior to surgery.    *Any questions regarding co-pays or deductibles with insurance, please contact the [facility] at #[phone].    *For Financial Assistance you may call #[phone] or #[phone].    Thank you for choosing Dr. Wilton Davidson for your Orthopedic needs.  We look forward to caring for you. If you have any questions, please contact our office at [phone].

## 2024-09-23 NOTE — PROGRESS NOTES
Patient was here for her right foot Hernandez fracture.  She is having increasing swelling.  Has some increased pain.  X-rays show she displaced of the fracture.  We will plan on open reduction internal fixation of the 5th metatarsal.  Neurovascularly she is intact distally risks and benefits surgery were discussed we will plan on doing open reduction internal fixation of the right 5th metatarsal fracture

## 2024-09-23 NOTE — PROGRESS NOTES
Radiology Interpretation        Patient Name: Latha Palm  Date: 9/23/2024  YOB: 1980  MRN# [id number]        ORDERING DIAGNOSIS:    Encounter Diagnosis   Name Primary?    Closed displaced fracture of fifth metatarsal bone of right foot, initial encounter Yes      Three views right foot skeletally mature individual there is normal mineralization there is a fracture of the 5th metatarsal at the metaphyseal diaphyseal junction.  There is a fracture nondisplaced of the 4th metatarsal base with rounded edges and healing callus.  There has been displacement from previous x-rays.  No bony lesions.  Impression displaced fracture 5th metatarsal with 4th metatarsal base fracture                 Wilton Davidson MD

## 2024-09-24 LAB
OHS QRS DURATION: 82 MS
OHS QTC CALCULATION: 424 MS

## 2024-09-25 ENCOUNTER — TELEPHONE (OUTPATIENT)
Dept: FAMILY MEDICINE | Facility: CLINIC | Age: 44
End: 2024-09-25
Payer: COMMERCIAL

## 2024-09-25 NOTE — TELEPHONE ENCOUNTER
Called and discussed care gaps with pt. She stated she [facility] in [place]. Can you please request her records and update care gap      Also discussed mammogram and does not want to have done at this time. Has an apt with Dr. Calix 10/14/24 and will discuss more then.

## 2024-09-26 NOTE — H&P (VIEW-ONLY)
Department of Orthopedic Surgery    History and Physical       Principal Problem: Closed displaced fracture of fifth metatarsal bone of right foot, initial encounter [Q39.898M]           HISTORY:  44-year-old female with a displaced 5th metatarsal fracture with a nondisplaced 4th metatarsal fracture needing open reduction internal fixation of the right 5th metatarsal fracture    PAST MEDICAL HISTORY:   Past Medical History:   Diagnosis Date    Asthma     Diabetes mellitus     Hypertension     Neuropathy     Scoliosis     Unspecified osteoarthritis, unspecified site         PAST SURGICAL HISTORY:   Past Surgical History:   Procedure Laterality Date     SECTION      HYSTERECTOMY            ALLERGIES:   Review of patient's allergies indicates:   Allergen Reactions    Clindamycin Anaphylaxis    Penicillins Swelling    Amoxicillin     Bactrim [sulfamethoxazole-trimethoprim]     Cyclobenzaprine     Morphine     Opioids - morphine analogues     Promethazine     Toradol [ketorolac]     Tramadol           MEDICATIONS: (Not in a hospital admission)       SOCIAL HISTORY:   Social History     Socioeconomic History    Marital status:     Number of children: 4   Tobacco Use    Smoking status: Former     Types: Cigarettes     Start date:      Passive exposure: Past    Smokeless tobacco: Never   Substance and Sexual Activity    Alcohol use: Not Currently    Drug use: Never    Sexual activity: Yes     Partners: Male     Birth control/protection: See Surgical Hx   Social History Narrative    Lives at home with  and 1 child          FAMILY HISTORY: No family history on file.       PHYSICAL EXAM:   There were no vitals filed for this visit.  There is no height or weight on file to calculate BMI.     In general, this is a well-developed, well-nourished female . The patient is alert, oriented and cooperative.      HEENT:  Normocephalic, atraumatic.  Extraocular movements are intact bilaterally.  The  oropharynx is benign.       NECK:  Nontender with good range of motion.      LUNGS:  Clear to auscultation bilaterally.      HEART:  Demonstrates a regular rate and rhythm.  No murmurs appreciated.      ABDOMEN:  Soft, non-tender, non-distended.        EXTREMITIES:  Right lower extremity she moves her toes has sensation to touch has palpable pulses tender to palpation over the 5th metatarsal base there is swelling and ecchymosis noted full motion of the toes      RADIOGRAPHIC FINDINGS:  X-rays show displaced fracture of the 5th metatarsal base with the 4th metatarsal base nondisplaced fracture      IMPRESSION:  Right 5th metatarsal fracture displaced with a nondisplaced proximal 4th metatarsal fracture      PLAN:  Open reduction internal fixation of the right 5th metatarsal fracture possible pinning 4th metatarsal fracture    I had a long discussion with the patient about treatment options, including operative and nonoperative treatments. We discussed pros and cons of each including risks pertinent to surgery including pain, infection, bleeding, damage to adjacent structures like nerves and blood vessels, failure to heal, need for future surgeries, stiffness, instability, loss of limb, anesthesia risks like stroke, blood clot, loss of life. We discussed the possibility of need for later hardware removal in the case that hardware was used. We discussed common and uncommon risks, and discussed patient specific factors that may increase the risks present with surgery. All questions were answered. The patient expressed understanding of the pros and cons of surgery and wanted to proceed with surgical treatment.        (Subject to voice recognition error, transcription service not allowed)

## 2024-09-26 NOTE — PROGRESS NOTES
Department of Orthopedic Surgery    History and Physical       Principal Problem: Closed displaced fracture of fifth metatarsal bone of right foot, initial encounter [B29.923Y]           HISTORY:  44-year-old female with a displaced 5th metatarsal fracture with a nondisplaced 4th metatarsal fracture needing open reduction internal fixation of the right 5th metatarsal fracture    PAST MEDICAL HISTORY:   Past Medical History:   Diagnosis Date    Asthma     Diabetes mellitus     Hypertension     Neuropathy     Scoliosis     Unspecified osteoarthritis, unspecified site         PAST SURGICAL HISTORY:   Past Surgical History:   Procedure Laterality Date     SECTION      HYSTERECTOMY            ALLERGIES:   Review of patient's allergies indicates:   Allergen Reactions    Clindamycin Anaphylaxis    Penicillins Swelling    Amoxicillin     Bactrim [sulfamethoxazole-trimethoprim]     Cyclobenzaprine     Morphine     Opioids - morphine analogues     Promethazine     Toradol [ketorolac]     Tramadol           MEDICATIONS: (Not in a hospital admission)       SOCIAL HISTORY:   Social History     Socioeconomic History    Marital status:     Number of children: 4   Tobacco Use    Smoking status: Former     Types: Cigarettes     Start date:      Passive exposure: Past    Smokeless tobacco: Never   Substance and Sexual Activity    Alcohol use: Not Currently    Drug use: Never    Sexual activity: Yes     Partners: Male     Birth control/protection: See Surgical Hx   Social History Narrative    Lives at home with  and 1 child          FAMILY HISTORY: No family history on file.       PHYSICAL EXAM:   There were no vitals filed for this visit.  There is no height or weight on file to calculate BMI.     In general, this is a well-developed, well-nourished female . The patient is alert, oriented and cooperative.      HEENT:  Normocephalic, atraumatic.  Extraocular movements are intact bilaterally.  The  oropharynx is benign.       NECK:  Nontender with good range of motion.      LUNGS:  Clear to auscultation bilaterally.      HEART:  Demonstrates a regular rate and rhythm.  No murmurs appreciated.      ABDOMEN:  Soft, non-tender, non-distended.        EXTREMITIES:  Right lower extremity she moves her toes has sensation to touch has palpable pulses tender to palpation over the 5th metatarsal base there is swelling and ecchymosis noted full motion of the toes      RADIOGRAPHIC FINDINGS:  X-rays show displaced fracture of the 5th metatarsal base with the 4th metatarsal base nondisplaced fracture      IMPRESSION:  Right 5th metatarsal fracture displaced with a nondisplaced proximal 4th metatarsal fracture      PLAN:  Open reduction internal fixation of the right 5th metatarsal fracture possible pinning 4th metatarsal fracture    I had a long discussion with the patient about treatment options, including operative and nonoperative treatments. We discussed pros and cons of each including risks pertinent to surgery including pain, infection, bleeding, damage to adjacent structures like nerves and blood vessels, failure to heal, need for future surgeries, stiffness, instability, loss of limb, anesthesia risks like stroke, blood clot, loss of life. We discussed the possibility of need for later hardware removal in the case that hardware was used. We discussed common and uncommon risks, and discussed patient specific factors that may increase the risks present with surgery. All questions were answered. The patient expressed understanding of the pros and cons of surgery and wanted to proceed with surgical treatment.        (Subject to voice recognition error, transcription service not allowed)

## 2024-09-27 ENCOUNTER — HOSPITAL ENCOUNTER (OUTPATIENT)
Facility: HOSPITAL | Age: 44
Discharge: HOME | End: 2024-09-27
Attending: ORTHOPAEDIC SURGERY | Admitting: ORTHOPAEDIC SURGERY
Payer: COMMERCIAL

## 2024-09-27 ENCOUNTER — ANESTHESIA (OUTPATIENT)
Dept: SURGERY | Facility: HOSPITAL | Age: 44
End: 2024-09-27
Payer: COMMERCIAL

## 2024-09-27 ENCOUNTER — PATIENT OUTREACH (OUTPATIENT)
Facility: HOSPITAL | Age: 44
End: 2024-09-27
Payer: COMMERCIAL

## 2024-09-27 ENCOUNTER — ANESTHESIA EVENT (OUTPATIENT)
Dept: SURGERY | Facility: HOSPITAL | Age: 44
End: 2024-09-27
Payer: COMMERCIAL

## 2024-09-27 VITALS
HEIGHT: 65 IN | TEMPERATURE: 98 F | BODY MASS INDEX: 31.49 KG/M2 | RESPIRATION RATE: 16 BRPM | OXYGEN SATURATION: 98 % | WEIGHT: 189 LBS | SYSTOLIC BLOOD PRESSURE: 108 MMHG | DIASTOLIC BLOOD PRESSURE: 68 MMHG | HEART RATE: 72 BPM

## 2024-09-27 DIAGNOSIS — S92.351A CLOSED DISPLACED FRACTURE OF FIFTH METATARSAL BONE OF RIGHT FOOT: ICD-10-CM

## 2024-09-27 LAB
GLUCOSE SERPL-MCNC: 217 MG/DL (ref 70–105)
GLUCOSE SERPL-MCNC: 352 MG/DL (ref 70–105)

## 2024-09-27 PROCEDURE — 36000709 HC OR TIME LEV III EA ADD 15 MIN: Performed by: ORTHOPAEDIC SURGERY

## 2024-09-27 PROCEDURE — 27201423 OPTIME MED/SURG SUP & DEVICES STERILE SUPPLY: Performed by: ORTHOPAEDIC SURGERY

## 2024-09-27 PROCEDURE — 37000008 HC ANESTHESIA 1ST 15 MINUTES: Performed by: ORTHOPAEDIC SURGERY

## 2024-09-27 PROCEDURE — 82962 GLUCOSE BLOOD TEST: CPT

## 2024-09-27 PROCEDURE — C1769 GUIDE WIRE: HCPCS | Performed by: ORTHOPAEDIC SURGERY

## 2024-09-27 PROCEDURE — 97161 PT EVAL LOW COMPLEX 20 MIN: CPT

## 2024-09-27 PROCEDURE — C1713 ANCHOR/SCREW BN/BN,TIS/BN: HCPCS | Performed by: ORTHOPAEDIC SURGERY

## 2024-09-27 PROCEDURE — 36000708 HC OR TIME LEV III 1ST 15 MIN: Performed by: ORTHOPAEDIC SURGERY

## 2024-09-27 PROCEDURE — D9220A PRA ANESTHESIA: Mod: ANES,,, | Performed by: ANESTHESIOLOGY

## 2024-09-27 PROCEDURE — 71000015 HC POSTOP RECOV 1ST HR: Performed by: ORTHOPAEDIC SURGERY

## 2024-09-27 PROCEDURE — 25000003 PHARM REV CODE 250: Performed by: ORTHOPAEDIC SURGERY

## 2024-09-27 PROCEDURE — 37000009 HC ANESTHESIA EA ADD 15 MINS: Performed by: ORTHOPAEDIC SURGERY

## 2024-09-27 PROCEDURE — 63600175 PHARM REV CODE 636 W HCPCS: Performed by: NURSE ANESTHETIST, CERTIFIED REGISTERED

## 2024-09-27 PROCEDURE — 71000016 HC POSTOP RECOV ADDL HR: Performed by: ORTHOPAEDIC SURGERY

## 2024-09-27 PROCEDURE — 63600175 PHARM REV CODE 636 W HCPCS: Performed by: ANESTHESIOLOGY

## 2024-09-27 PROCEDURE — 28485 OPTX METATARSAL FX EACH: CPT | Mod: 78,RT,, | Performed by: ORTHOPAEDIC SURGERY

## 2024-09-27 PROCEDURE — 71000033 HC RECOVERY, INTIAL HOUR: Performed by: ORTHOPAEDIC SURGERY

## 2024-09-27 PROCEDURE — D9220A PRA ANESTHESIA: Mod: CRNA,,, | Performed by: NURSE ANESTHETIST, CERTIFIED REGISTERED

## 2024-09-27 PROCEDURE — 63600175 PHARM REV CODE 636 W HCPCS: Performed by: ORTHOPAEDIC SURGERY

## 2024-09-27 PROCEDURE — 25000003 PHARM REV CODE 250: Performed by: NURSE ANESTHETIST, CERTIFIED REGISTERED

## 2024-09-27 RX ORDER — MIDAZOLAM HYDROCHLORIDE 1 MG/ML
INJECTION INTRAMUSCULAR; INTRAVENOUS
Status: DISCONTINUED | OUTPATIENT
Start: 2024-09-27 | End: 2024-09-27

## 2024-09-27 RX ORDER — OXYCODONE AND ACETAMINOPHEN 10; 325 MG/1; MG/1
1 TABLET ORAL EVERY 6 HOURS PRN
Qty: 28 TABLET | Refills: 0 | Status: SHIPPED | OUTPATIENT
Start: 2024-09-27

## 2024-09-27 RX ORDER — SODIUM CHLORIDE 9 MG/ML
INJECTION, SOLUTION INTRAVENOUS CONTINUOUS
Status: DISCONTINUED | OUTPATIENT
Start: 2024-09-27 | End: 2024-09-27 | Stop reason: HOSPADM

## 2024-09-27 RX ORDER — IPRATROPIUM BROMIDE AND ALBUTEROL SULFATE 2.5; .5 MG/3ML; MG/3ML
3 SOLUTION RESPIRATORY (INHALATION)
Status: DISCONTINUED | OUTPATIENT
Start: 2024-09-27 | End: 2024-09-27 | Stop reason: HOSPADM

## 2024-09-27 RX ORDER — GLUCAGON 1 MG
1 KIT INJECTION
Status: DISCONTINUED | OUTPATIENT
Start: 2024-09-27 | End: 2024-09-27 | Stop reason: HOSPADM

## 2024-09-27 RX ORDER — OXYCODONE HYDROCHLORIDE 5 MG/1
10 TABLET ORAL EVERY 4 HOURS PRN
Status: DISCONTINUED | OUTPATIENT
Start: 2024-09-27 | End: 2024-09-27 | Stop reason: HOSPADM

## 2024-09-27 RX ORDER — HYDROMORPHONE HYDROCHLORIDE 2 MG/ML
0.5 INJECTION, SOLUTION INTRAMUSCULAR; INTRAVENOUS; SUBCUTANEOUS EVERY 5 MIN PRN
Status: DISCONTINUED | OUTPATIENT
Start: 2024-09-27 | End: 2024-09-27 | Stop reason: HOSPADM

## 2024-09-27 RX ORDER — ONDANSETRON HYDROCHLORIDE 2 MG/ML
INJECTION, SOLUTION INTRAVENOUS
Status: DISCONTINUED | OUTPATIENT
Start: 2024-09-27 | End: 2024-09-27

## 2024-09-27 RX ORDER — HYDROCODONE BITARTRATE AND ACETAMINOPHEN 5; 325 MG/1; MG/1
1 TABLET ORAL EVERY 6 HOURS PRN
COMMUNITY

## 2024-09-27 RX ORDER — PROPOFOL 10 MG/ML
VIAL (ML) INTRAVENOUS
Status: DISCONTINUED | OUTPATIENT
Start: 2024-09-27 | End: 2024-09-27

## 2024-09-27 RX ORDER — SODIUM CHLORIDE, SODIUM LACTATE, POTASSIUM CHLORIDE, CALCIUM CHLORIDE 600; 310; 30; 20 MG/100ML; MG/100ML; MG/100ML; MG/100ML
INJECTION, SOLUTION INTRAVENOUS CONTINUOUS
Status: DISCONTINUED | OUTPATIENT
Start: 2024-09-27 | End: 2024-09-27 | Stop reason: HOSPADM

## 2024-09-27 RX ORDER — FENTANYL CITRATE 50 UG/ML
INJECTION, SOLUTION INTRAMUSCULAR; INTRAVENOUS
Status: DISCONTINUED | OUTPATIENT
Start: 2024-09-27 | End: 2024-09-27

## 2024-09-27 RX ORDER — MEPERIDINE HYDROCHLORIDE 25 MG/ML
25 INJECTION INTRAMUSCULAR; INTRAVENOUS; SUBCUTANEOUS EVERY 10 MIN PRN
Status: DISCONTINUED | OUTPATIENT
Start: 2024-09-27 | End: 2024-09-27 | Stop reason: HOSPADM

## 2024-09-27 RX ORDER — ACETAMINOPHEN 500 MG
1000 TABLET ORAL EVERY 6 HOURS PRN
Status: DISCONTINUED | OUTPATIENT
Start: 2024-09-27 | End: 2024-09-27 | Stop reason: HOSPADM

## 2024-09-27 RX ORDER — ONDANSETRON 4 MG/1
8 TABLET, ORALLY DISINTEGRATING ORAL EVERY 8 HOURS PRN
Status: DISCONTINUED | OUTPATIENT
Start: 2024-09-27 | End: 2024-09-27 | Stop reason: HOSPADM

## 2024-09-27 RX ORDER — LIDOCAINE HYDROCHLORIDE 20 MG/ML
INJECTION, SOLUTION EPIDURAL; INFILTRATION; INTRACAUDAL; PERINEURAL
Status: DISCONTINUED | OUTPATIENT
Start: 2024-09-27 | End: 2024-09-27

## 2024-09-27 RX ORDER — ONDANSETRON HYDROCHLORIDE 2 MG/ML
4 INJECTION, SOLUTION INTRAVENOUS DAILY PRN
Status: DISCONTINUED | OUTPATIENT
Start: 2024-09-27 | End: 2024-09-27 | Stop reason: HOSPADM

## 2024-09-27 RX ORDER — LIDOCAINE HYDROCHLORIDE 10 MG/ML
1 INJECTION, SOLUTION EPIDURAL; INFILTRATION; INTRACAUDAL; PERINEURAL ONCE
Status: DISCONTINUED | OUTPATIENT
Start: 2024-09-27 | End: 2024-09-27 | Stop reason: HOSPADM

## 2024-09-27 RX ORDER — DIPHENHYDRAMINE HYDROCHLORIDE 50 MG/ML
25 INJECTION INTRAMUSCULAR; INTRAVENOUS EVERY 6 HOURS PRN
Status: DISCONTINUED | OUTPATIENT
Start: 2024-09-27 | End: 2024-09-27 | Stop reason: HOSPADM

## 2024-09-27 RX ORDER — OXYCODONE HYDROCHLORIDE 5 MG/1
5 TABLET ORAL EVERY 4 HOURS PRN
Status: DISCONTINUED | OUTPATIENT
Start: 2024-09-27 | End: 2024-09-27 | Stop reason: HOSPADM

## 2024-09-27 RX ORDER — SODIUM CHLORIDE, SODIUM LACTATE, POTASSIUM CHLORIDE, CALCIUM CHLORIDE 600; 310; 30; 20 MG/100ML; MG/100ML; MG/100ML; MG/100ML
125 INJECTION, SOLUTION INTRAVENOUS CONTINUOUS
Status: DISCONTINUED | OUTPATIENT
Start: 2024-09-27 | End: 2024-09-27 | Stop reason: HOSPADM

## 2024-09-27 RX ADMIN — FENTANYL CITRATE 100 MCG: 50 INJECTION, SOLUTION INTRAMUSCULAR; INTRAVENOUS at 09:09

## 2024-09-27 RX ADMIN — SODIUM CHLORIDE: 9 INJECTION, SOLUTION INTRAVENOUS at 09:09

## 2024-09-27 RX ADMIN — SODIUM CHLORIDE: 9 INJECTION, SOLUTION INTRAVENOUS at 08:09

## 2024-09-27 RX ADMIN — HUMAN INSULIN 6 UNITS: 100 INJECTION, SOLUTION SUBCUTANEOUS at 09:09

## 2024-09-27 RX ADMIN — LIDOCAINE HYDROCHLORIDE 80 MG: 20 INJECTION, SOLUTION INTRAVENOUS at 09:09

## 2024-09-27 RX ADMIN — VANCOMYCIN HYDROCHLORIDE 250 MG: 1 INJECTION, POWDER, LYOPHILIZED, FOR SOLUTION INTRAVENOUS at 09:09

## 2024-09-27 RX ADMIN — MIDAZOLAM HYDROCHLORIDE 2 MG: 1 INJECTION, SOLUTION INTRAMUSCULAR; INTRAVENOUS at 09:09

## 2024-09-27 RX ADMIN — PROPOFOL 200 MG: 10 INJECTION, EMULSION INTRAVENOUS at 09:09

## 2024-09-27 RX ADMIN — ONDANSETRON 4 MG: 2 INJECTION INTRAMUSCULAR; INTRAVENOUS at 09:09

## 2024-09-27 NOTE — BRIEF OP NOTE
"[facility] - Orthopedic Periop Services  Brief Operative Note    Surgery Date: 9/27/2024     Surgeons and Role:     * Wilton Davidson MD - Primary    Assisting Surgeon: None    Pre-op Diagnosis:  Closed displaced fracture of fifth metatarsal bone of right foot, initial encounter [S92.351A]    Post-op Diagnosis:  Post-Op Diagnosis Codes:     * Closed displaced fracture of fifth metatarsal bone of right foot, initial encounter [S92.351A]    Procedure(s) (LRB):  ORIF, FRACTURE, RIGHT 5TH METATARSAL BONE (Right)    Anesthesia: General    Operative Findings:  Patient underwent an ORIF of the 5th metatarsal on right without complication    Estimated Blood Loss: * No values recorded between 9/27/2024  9:38 AM and 9/27/2024 10:33 AM *         Specimens:   Specimen (24h ago, onward)      None              Discharge Note    OUTCOME: Patient tolerated treatment/procedure well without complication and is now ready for discharge.    DISPOSITION: Home or Self Care    FINAL DIAGNOSIS:  Closed displaced fracture of fifth metatarsal bone of right foot    FOLLOWUP: In clinic    DISCHARGE INSTRUCTIONS:    Discharge Procedure Orders   CRUTCHES FOR HOME USE     Order Specific Question Answer Comments   Type: Axillary    Height: 5' 5" (1.651 m)    Weight: 85.7 kg (189 lb)    Length of need (1-99 months): 2      Diet general     Keep surgical extremity elevated     Ice to affected area   Order Comments: using barrier between ice and skin (specify duration&frequency)     Call MD for:  temperature >100.4     Call MD for:  persistent nausea and vomiting     Call MD for:  severe uncontrolled pain     Call MD for:  difficulty breathing, headache or visual disturbances     Call MD for:  redness, tenderness, or signs of infection (pain, swelling, redness, odor or green/yellow discharge around incision site)     Call MD for:  hives     Call MD for:  persistent dizziness or light-headedness     Call MD for:  extreme fatigue     Leave " dressing on - Keep it clean, dry, and intact until clinic visit     Activity as tolerated     Shower on day dressing removed (No bath)     Weight bearing as tolerated        Clinical Reference Documents Added to Patient Instructions         Document    STRESS FRACTURE OF THE METATARSAL BONE DISCHARGE INSTRUCTIONS (ENGLISH)

## 2024-09-27 NOTE — LETTER
AUTHORIZATION FOR RELEASE OF   CONFIDENTIAL INFORMATION    Dear [facility],    We are seeing Latha Palm, date of birth 1980, in the clinic at [facility]. Deepti Calix MD is the patient's PCP. Latha Palm has an outstanding lab/procedure at the time we reviewed her chart. In order to help keep her health information updated, she has authorized us to request the following medical record(s):        (  )  MAMMOGRAM                                      (  )  COLONOSCOPY      (  )  PAP SMEAR                                          (  )  OUTSIDE LAB RESULTS     (  )  DEXA SCAN                                          (X)  EYE EXAM            (  )  FOOT EXAM                                          (  )  ENTIRE RECORD     (  )  OUTSIDE IMMUNIZATIONS                 (  )  _______________         Please fax records to [facility], Caroline Allen, [phone].     If you have any questions, please contact [phone].          Patient Name: Latha Palm  : 1980  Patient Phone #: [phone]

## 2024-09-27 NOTE — ANESTHESIA PREPROCEDURE EVALUATION
09/27/2024  Latha Palm is a 44 y.o., female.      Pre-op Assessment    I have reviewed the Patient Summary Reports.     I have reviewed the Nursing Notes. I have reviewed the NPO Status.   I have reviewed the Medications.     Review of Systems  Anesthesia Hx:  No problems with previous Anesthesia                Social:  Non-Smoker, No Alcohol Use       Hematology/Oncology:  Hematology Normal                       --  Cancer in past history:                     EENT/Dental:  EENT/Dental Normal           Cardiovascular:     Hypertension                                        Pulmonary:    Asthma                    Renal/:  Renal/ Normal                 Hepatic/GI:     GERD Liver Disease,  Hx gastric ulcer    Hx alpha gal          Musculoskeletal:  Arthritis               OB/GYN/PEDS:  Hx cervical cancer           Neurological:      Headaches           Chronic Pain Syndrome                         Endocrine:  Diabetes, poorly controlled, type 2         Morbid Obesity / BMI > 40  Dermatological:  Skin Normal    Psych:   anxiety depression                   Anesthesia Plan  Type of Anesthesia, risks & benefits discussed:    Anesthesia Type: Gen Supraglottic Airway  Intra-op Monitoring Plan: Standard ASA Monitors  Post Op Pain Control Plan: multimodal analgesia  Induction:  IV  Informed Consent: Informed consent signed with the Patient and all parties understand the risks and agree with anesthesia plan.  All questions answered.   ASA Score: 3  Day of Surgery Review of History & Physical: H&P Update referred to the surgeon/provider.I have interviewed and examined the patient. I have reviewed the patient's H&P dated:     Ready For Surgery From Anesthesia Perspective.     .

## 2024-09-27 NOTE — ANESTHESIA PROCEDURE NOTES
Intubation    Date/Time: 9/27/2024 9:10 AM    Performed by: Melchor Parsons CRNA  Authorized by: Marlo Zhao MD    Intubation:     Induction:  Intravenous    Intubated:  Postinduction    Mask Ventilation:  Easy mask    Attempts:  1    Attempted By:  CRNA    Difficult Airway Encountered?: No      Complications:  None    Airway Device:  Supraglottic airway/LMA    Airway Device Size:  3.0    Style/Cuff Inflation:  Cuffed (inflated to minimal occlusive pressure)    Placement Verified By:  Capnometry    Complicating Factors:  None    Findings Post-Intubation:  BS equal bilateral and atraumatic/condition of teeth unchanged

## 2024-09-27 NOTE — PT/OT/SLP EVAL
Physical Therapy Evaluation and Discharge Note    Patient Name:  Latha Palm   MRN:  [id number]    Recommendations:     Discharge Recommendations: Low Intensity Therapy  Discharge Equipment Recommendations: walker, rolling   Barriers to discharge: None    Assessment:     Latha Palm is a 44 y.o. female admitted with a medical diagnosis of Closed displaced fracture of fifth metatarsal bone of right foot. Pt requested to use rolling walker instead of crutches. She demonstrates good ability to maintain TTWB with rolling walker .  At this time, patient is functioning at their prior level of function and does not require further acute PT services.     Recent Surgery: Procedure(s) (LRB):  ORIF, FRACTURE, RIGHT 5TH METATARSAL BONE (Right) Day of Surgery    Plan:     During this hospitalization, patient does not require further acute PT services.  Please re-consult if situation changes.      Subjective     Chief Complaint: right foot pain  Patient/Family Comments/goals: Pt is agreeable to PT   Pain/Comfort:  Pain Rating 1: 3/10  Location - Side 1: Right  Location 1: foot  Pain Addressed 1: Pre-medicate for activity  Pain Rating Post-Intervention 1: 3/10    Patients cultural, spiritual, Mandaeism conflicts given the current situation: no    Living Environment:  Pt lives at home with family, has 5 steps to enter home  Prior to admission, patients level of function was independent.  Equipment used at home: none.  DME owned (not currently used): none.  Upon discharge, patient will have assistance from family.    Objective:     Communicated with RN prior to session.  Patient found HOB elevated with peripheral IV, blood pressure cuff, pulse ox (continuous) upon PT entry to room.    General Precautions: Standard, fall    Orthopedic Precautions:RLE toe touch weight bearing   Braces: N/A  Respiratory Status: Room air    Exams:  Cognitive Exam:  Patient is oriented to Person, Place, Time, and Situation  Gross Motor  Coordination:  WFL    Functional Mobility:  Bed Mobility:     Scooting: supervision  Supine to Sit: supervision  Transfers:     Sit to Stand:  stand by assistance with rolling walker  Gait: 50 ft stand-by assistance with rolling walker   Balance: good    AM-PAC 6 CLICK MOBILITY  Total Score:22       Treatment and Education:  Educated on use of rolling walker for ttwb     AM-PAC 6 CLICK MOBILITY  Total Score:22     Patient left sitting edge of bed with all lines intact and call button in reach.    GOALS:   Multidisciplinary Problems       Physical Therapy Goals       Not on file              Multidisciplinary Problems (Resolved)          Problem: Physical Therapy    Goal Priority Disciplines Outcome Goal Variances Interventions   Physical Therapy Goal   (Resolved)     PT, PT/OT Met     Description: STG:  Pt will be independent in home exercise program   Pt will be independent in gait using rolling walker  with TDWB: right lower extremity     LTG:  Pt will return home to prior level of function with all mobility                         History:     Past Medical History:   Diagnosis Date    Asthma     Diabetes mellitus     Digestive disorder     Hypertension     Neuropathy     Scoliosis     Seizures     Unspecified osteoarthritis, unspecified site        Past Surgical History:   Procedure Laterality Date     SECTION      DILATION AND CURETTAGE OF UTERUS      HYSTERECTOMY         Time Tracking:     PT Received On: 24  PT Start Time: 1210     PT Stop Time: 1235  PT Total Time (min): 25 min     Billable Minutes: Evaluation low complexity      2024

## 2024-09-27 NOTE — TRANSFER OF CARE
"Anesthesia Transfer of Care Note    Patient: Latha Palm    Procedure(s) Performed: Procedure(s) (LRB):  ORIF, FRACTURE, RIGHT 5TH METATARSAL BONE (Right)    Patient location: PACU    Anesthesia Type: general    Transport from OR: Transported from OR on room air with adequate spontaneous ventilation    Post pain: adequate analgesia    Post assessment: no apparent anesthetic complications and tolerated procedure well    Post vital signs: stable    Level of consciousness: responds to stimulation    Nausea/Vomiting: no nausea/vomiting    Complications: none    Transfer of care protocol was followedComments: Report Given to PACU rn VSS      Last vitals: Visit Vitals  BP (!) 84/45 (BP Location: Right arm, Patient Position: Lying)   Pulse 75   Temp 36.6 °C (97.9 °F) (Oral)   Resp 13   Ht 5' 5" (1.651 m)   Wt 85.7 kg (189 lb)   SpO2 (!) 94%   Breastfeeding No   BMI 31.45 kg/m²     "

## 2024-09-27 NOTE — PLAN OF CARE
1040 RECEIVED RESTING QUIETLY WITH EYES CLOSED, NO DISTRESS NOTED. DRESSING TO RIGHT LOWER EXT C/D/I. WILL CONTINUE TO MONITOR.    1105 FAMILY NOTIFIED OF PATIENT STATUS    1120 TRANSFERRED TO ASC #21, AWAKE AND ALERT, NO DISTRESS NOTED. REPORT GIVEN TO LAMAR GALLO 97% RA, 80, 99/72. FAMILY IN ROOM

## 2024-09-27 NOTE — PLAN OF CARE
Problem: Physical Therapy  Goal: Physical Therapy Goal  Description: STG:  Pt will be independent in home exercise program   Pt will be independent in gait using rolling walker  with TDWB: right lower extremity     LTG:  Pt will return home to prior level of function with all mobility    Outcome: Met

## 2024-09-27 NOTE — INTERVAL H&P NOTE
The patient has been examined and the H&P has been reviewed:    I concur with the findings and no changes have occurred since H&P was written.    Surgery risks, benefits and alternative options discussed and understood by patient/family.          Active Hospital Problems    Diagnosis  POA    *Closed displaced fracture of fifth metatarsal bone of right foot [S92.351A]  Yes      Resolved Hospital Problems   No resolved problems to display.

## 2024-09-27 NOTE — PLAN OF CARE
[facility] - Orthopedic Periop Services  Initial Discharge Assessment       Primary Care Provider: Deepti Calix MD    Admission Diagnosis: Closed displaced fracture of fifth metatarsal bone of right foot, initial encounter [S92.351A]  Closed displaced fracture of fifth metatarsal bone of right foot [S92.351A]    Admission Date: 9/27/2024  Expected Discharge Date: 9/27/2024    Transition of Care Barriers: None    Payor: [employer] / Plan: [employer] [facility] [employer] [place] / Product Type: Commercial /     Extended Emergency Contact Information  Primary Emergency Contact: Shalom Palm  Mobile Phone: [phone]  Relation: Spouse   needed? No    Discharge Plan A: Home with family  Discharge Plan B: Home with family      [facility] - [place] - [address]  [address]  [place] [address]  Phone: [phone] Fax: [phone]    [facility] - [place] - [address] [place] [address]  Phone: [phone] Fax: [phone]      Initial Assessment (most recent)       Adult Discharge Assessment - 09/27/24 1242          Discharge Assessment    Assessment Type Discharge Planning Assessment     Source of Information family     People in Home spouse     Do you expect to return to your current living situation? Yes     Do you have help at home or someone to help you manage your care at home? Yes     Who are your caregiver(s) and their phone number(s)? Shalom Palm- spouse [phone]     Prior to hospitilization cognitive status: Unable to Assess     Current cognitive status: Alert/Oriented     Walking or Climbing Stairs Difficulty no     Dressing/Bathing Difficulty no     Home Accessibility stairs to enter home     Number of Stairs, Main Entrance four     Stair Railings, Main Entrance railings on both sides of stairs     Equipment Currently Used at Home none     Readmission within 30 days? No     Patient currently being  followed by outpatient case management? No     Do you currently have service(s) that help you manage your care at home? No     Do you take prescription medications? Yes     Do you have prescription coverage? No     Do you have any problems affording any of your prescribed medications? No     Is the patient taking medications as prescribed? yes     Who is going to help you get home at discharge? Shalom Palm- Spouse     How do you get to doctors appointments? car, drives self     Are you on dialysis? No     Do you take coumadin? No     Discharge Plan A Home with family     Discharge Plan B Home with family     DME Needed Upon Discharge  walker, rolling     Discharge Plan discussed with: Patient;Spouse/sig other     Name(s) and Number(s) Shalom Palm- Spouse     Transition of Care Barriers None        Physical Activity    On average, how many days per week do you engage in moderate to strenuous exercise (like a brisk walk)? 0 days     On average, how many minutes do you engage in exercise at this level? 0 min        Financial Resource Strain    How hard is it for you to pay for the very basics like food, housing, medical care, and heating? Somewhat hard        Housing Stability    In the last 12 months, was there a time when you were not able to pay the mortgage or rent on time? No     At any time in the past 12 months, were you homeless or living in a shelter (including now)? No        Transportation Needs    Has the lack of transportation kept you from medical appointments, meetings, work or from getting things needed for daily living? No        Food Insecurity    Within the past 12 months, you worried that your food would run out before you got the money to buy more. Never true     Within the past 12 months, the food you bought just didn't last and you didn't have money to get more. Never true        Stress    Do you feel stress - tense, restless, nervous, or anxious, or unable to sleep at night because your mind  is troubled all the time - these days? Not at all        Social Isolation    How often do you feel lonely or isolated from those around you?  Never        Alcohol Use    Q1: How often do you have a drink containing alcohol? Never     Q2: How many drinks containing alcohol do you have on a typical day when you are drinking? Patient does not drink     Q3: How often do you have six or more drinks on one occasion? Never        Utilities    In the past 12 months has the electric, gas, oil, or water company threatened to shut off services in your home? No        Health Literacy    How often do you need to have someone help you when you read instructions, pamphlets, or other written material from your doctor or pharmacy? Never                   [facility] - Orthopedic [facility]  Discharge Final Note    Primary Care Provider: Deepti Calix MD    Expected Discharge Date: 9/27/2024    Final Discharge Note (most recent)       Final Note - 09/27/24 1246          Final Note    Assessment Type Final Discharge Note     Anticipated Discharge Disposition Home or Self Care     What phone number can be called within the next 1-3 days to see how you are doing after discharge? [phone]        Post-Acute Status    Post-Acute Authorization Home Health;HME     HME Status Set-up Complete/Auth obtained     Discharge Delays None known at this time                   Contact Info       Wilton Davidson MD   Specialty: Orthopedic Surgery    [address]   [address]  [place], Orthopedic [facility] Sports Medicine, [facility] [place] [address]   Phone: [phone]       Next Steps: Follow up in 2 week(s)    Instructions: Friday, October 11 @ 9:30          LETICIA consulted for rw. LETICIA spoke with pt and spouse, Shalom. Pt to dc today and wants rw ordered from [facility]. LETICIA faxed referral to Margaret and informed Margaret that pt will  after dc. SDOH questions completed, no needs.

## 2024-09-27 NOTE — OP NOTE
[facility] - Orthopedic Periop Services  General Surgery  Operative Note    SUMMARY     Date of Procedure: 9/27/2024     Procedure: Procedure(s) (LRB):  ORIF, FRACTURE, RIGHT 5TH METATARSAL BONE (Right)       Surgeons and Role:     * Wilton Davidson MD - Primary    Assisting Surgeon: None    Pre-Operative Diagnosis: Closed displaced fracture of fifth metatarsal bone of right foot, initial encounter [S92.351A]    Post-Operative Diagnosis: Post-Op Diagnosis Codes:     * Closed displaced fracture of fifth metatarsal bone of right foot, initial encounter [S92.351A]    Anesthesia: General    Operative Findings (including complications, if any):  After having risks and benefits of procedure explained at length the patient stating that she understands risks benefits wished to proceed with the procedure written informed consent was obtained patient was taken operating room placed in supine position on operative table anesthesia induced per Anesthesia.  At this time her right lower extremity had a tourniquet placed over cast padding on proximal right thigh right lower extremity was prepped and draped sterile fashion.  At this time leg was elevated exsanguinated Esmarch bandage tourniquet inflated 250 mm Hg.  It was up for total of 33 minutes.  At this time an incision was made over the base of the 5th metatarsal proximally 1-1/2 cm in length with a number 11 blade.  At this time the guide pin for the solid metatarsal screw from [employer] was placed through the tip of the 5th metatarsal and using a [place] elevator to help reduce through the incision the fracture site was reduced and the pin placed across the medullary canal and across fracture site.  Once it was confirmed to be in correct position it was over drilled with a cannulated Reamer it was then tapped to 4.5 mm which gave good purchase and control of the fragment.  Next a 4.5 solid screw was placed after the guide pin was removed in the fracture site was reduced  and compressed.  This compress the 4th metatarsal fracture that was had no angulation in his displacement.  At this time wound irrigated out copious amounts normal saline tourniquet was let down hemostasis maintained Bovie electrocautery the wound was closed with a interrupted 3-0 nylon suture sterile occlusive dressing placed patient was awakened taken recovery room in good condition all counts were correct no complications    Description of Technical Procedures:     Significant Surgical Tasks Conducted by the Assistant(s), if Applicable:     Estimated Blood Loss (EBL): * No values recorded between 9/27/2024  9:38 AM and 9/27/2024 10:31 AM *           Implants:   Implant Name Type Inv. Item Serial No.  Lot No. LRB No. Used Action   WIRE JON TRCR TIP .[id number] - [id number]  WIRE JON TRCR TIP .[id number]  [employer]  Right 1 Implanted and Explanted   Ar[employer]rex Metatarsal Screw  4.5mm x 40mm      Right 1 Implanted       Specimens:   Specimen (24h ago, onward)      None                    Condition: Good    Disposition: PACU - hemodynamically stable.    Attestation: I was present and scrubbed for the entire procedure.

## 2024-10-01 NOTE — ANESTHESIA POSTPROCEDURE EVALUATION
Anesthesia Post Evaluation    Patient: Latha Palm    Procedure(s) Performed: Procedure(s) (LRB):  ORIF, FRACTURE, RIGHT 5TH METATARSAL BONE (Right)    Final Anesthesia Type: general      Patient location during evaluation: PACU  Patient participation: Yes- Able to Participate  Level of consciousness: awake and sedated  Post-procedure vital signs: reviewed and stable  Pain management: adequate  Airway patency: patent    PONV status at discharge: No PONV  Anesthetic complications: no      Cardiovascular status: blood pressure returned to baseline  Respiratory status: unassisted  Hydration status: euvolemic  Follow-up not needed.              Vitals Value Taken Time   /68 09/27/24 1215   Temp 36.6 °C (97.9 °F) 09/27/24 1048   Pulse 72 09/27/24 1215   Resp 16 09/27/24 1215   SpO2 98 % 09/27/24 1215         Event Time   Out of Recovery 11:15:00         Pain/Moi Score: No data recorded

## 2024-10-11 ENCOUNTER — OFFICE VISIT (OUTPATIENT)
Dept: ORTHOPEDICS | Facility: CLINIC | Age: 44
End: 2024-10-11
Payer: COMMERCIAL

## 2024-10-11 ENCOUNTER — HOSPITAL ENCOUNTER (OUTPATIENT)
Dept: RADIOLOGY | Facility: HOSPITAL | Age: 44
Discharge: HOME | End: 2024-10-11
Attending: ORTHOPAEDIC SURGERY
Payer: COMMERCIAL

## 2024-10-11 VITALS
DIASTOLIC BLOOD PRESSURE: 66 MMHG | TEMPERATURE: 98 F | HEART RATE: 83 BPM | WEIGHT: 189 LBS | OXYGEN SATURATION: 97 % | BODY MASS INDEX: 31.49 KG/M2 | SYSTOLIC BLOOD PRESSURE: 121 MMHG | HEIGHT: 65 IN

## 2024-10-11 DIAGNOSIS — S92.351A CLOSED DISPLACED FRACTURE OF FIFTH METATARSAL BONE OF RIGHT FOOT, INITIAL ENCOUNTER: Primary | ICD-10-CM

## 2024-10-11 DIAGNOSIS — M79.671 RIGHT FOOT PAIN: ICD-10-CM

## 2024-10-11 DIAGNOSIS — M79.671 RIGHT FOOT PAIN: Primary | ICD-10-CM

## 2024-10-11 PROCEDURE — 1159F MED LIST DOCD IN RCRD: CPT | Mod: CPTII,,, | Performed by: ORTHOPAEDIC SURGERY

## 2024-10-11 PROCEDURE — 3066F NEPHROPATHY DOC TX: CPT | Mod: CPTII,,, | Performed by: ORTHOPAEDIC SURGERY

## 2024-10-11 PROCEDURE — 99024 POSTOP FOLLOW-UP VISIT: CPT | Mod: ,,, | Performed by: ORTHOPAEDIC SURGERY

## 2024-10-11 PROCEDURE — 99999 PR PBB SHADOW E&M-EST. PATIENT-LVL V: CPT | Mod: PBBFAC,,, | Performed by: ORTHOPAEDIC SURGERY

## 2024-10-11 PROCEDURE — 3074F SYST BP LT 130 MM HG: CPT | Mod: CPTII,,, | Performed by: ORTHOPAEDIC SURGERY

## 2024-10-11 PROCEDURE — 29405 APPL SHORT LEG CAST: CPT | Mod: PBBFAC | Performed by: ORTHOPAEDIC SURGERY

## 2024-10-11 PROCEDURE — 99215 OFFICE O/P EST HI 40 MIN: CPT | Mod: PBBFAC,25 | Performed by: ORTHOPAEDIC SURGERY

## 2024-10-11 PROCEDURE — 3046F HEMOGLOBIN A1C LEVEL >9.0%: CPT | Mod: CPTII,,, | Performed by: ORTHOPAEDIC SURGERY

## 2024-10-11 PROCEDURE — 3060F POS MICROALBUMINURIA REV: CPT | Mod: CPTII,,, | Performed by: ORTHOPAEDIC SURGERY

## 2024-10-11 PROCEDURE — 73630 X-RAY EXAM OF FOOT: CPT | Mod: TC,RT

## 2024-10-11 PROCEDURE — 3078F DIAST BP <80 MM HG: CPT | Mod: CPTII,,, | Performed by: ORTHOPAEDIC SURGERY

## 2024-10-11 PROCEDURE — 99999PBSHW PR PBB SHADOW TECHNICAL ONLY FILED TO HB: Mod: PBBFAC,,,

## 2024-10-11 PROCEDURE — 73630 X-RAY EXAM OF FOOT: CPT | Mod: 26,RT,, | Performed by: ORTHOPAEDIC SURGERY

## 2024-10-11 PROCEDURE — 4010F ACE/ARB THERAPY RXD/TAKEN: CPT | Mod: CPTII,,, | Performed by: ORTHOPAEDIC SURGERY

## 2024-10-11 RX ORDER — ALBUTEROL SULFATE 5 MG/ML
2.5 SOLUTION RESPIRATORY (INHALATION) EVERY 6 HOURS PRN
COMMUNITY

## 2024-10-11 RX ORDER — IBUPROFEN 800 MG/1
800 TABLET ORAL 3 TIMES DAILY
COMMUNITY

## 2024-10-11 NOTE — PROGRESS NOTES
Patient is here for follow-up of the ORIF of her right 5th metatarsal.  She has been putting some weight on the splint.  I took her out of the splint removed her stitches wounds show no signs of infection.  Removed the stitches today.  Placed her in a short-leg cast.  I wrote her for knee scooter.  That way she does not put weight on the foot.  She has some decreased sensation of the foot secondary to neuropathy.  I am going to keep her nonweightbearing on the foot for the next 4 weeks.

## 2024-10-11 NOTE — PROGRESS NOTES
Radiology Interpretation        Patient Name: Latha Palm  Date: 10/11/2024  YOB: 1980  MRN# [id number]        ORDERING DIAGNOSIS:  No diagnosis found.        Three views right foot skeletally mature individual there is a fracture of the 4th and 5th metatarsals there is a screw across the 5th metatarsal minimal displacement minimal if any displacement of the 4th metatarsal fracture there is callus forming impression healing fracture right 4th and 5th metatarsals            Wilton Davidson MD

## 2024-10-21 ENCOUNTER — TELEPHONE (OUTPATIENT)
Dept: FAMILY MEDICINE | Facility: CLINIC | Age: 44
End: 2024-10-21
Payer: COMMERCIAL

## 2024-10-21 NOTE — TELEPHONE ENCOUNTER
Records requested from [facility] on 9/27/24. Records still not received. Requested records again on 10/21/24.

## 2024-10-21 NOTE — LETTER
AUTHORIZATION FOR RELEASE OF   CONFIDENTIAL INFORMATION    Dear [facility],    We are seeing Latha aPlm, date of birth 1980, in the clinic at [facility]. Deepti Calix MD is the patient's PCP. Latha Palm has an outstanding lab/procedure at the time we reviewed her chart. In order to help keep her health information updated, she has authorized us to request the following medical record(s):        (  )  MAMMOGRAM                                      (  )  COLONOSCOPY      (  )  PAP SMEAR                                          (  )  OUTSIDE LAB RESULTS     (  )  DEXA SCAN                                          ( XX )  EYE EXAM            (  )  FOOT EXAM                                          (  )  ENTIRE RECORD     (  )  OUTSIDE IMMUNIZATIONS                 (  )  _______________         Please fax records to Deepti Calix MD, [phone]     If you have any questions, please contact Winnie Perez LPN at [phone].           Patient Name: Latha Palm  : 1980  Patient Phone #: [phone]

## 2024-10-31 ENCOUNTER — PATIENT OUTREACH (OUTPATIENT)
Facility: HOSPITAL | Age: 44
End: 2024-10-31
Payer: COMMERCIAL

## 2024-11-01 PROBLEM — E11.319 DIABETIC RETINOPATHY ASSOCIATED WITH TYPE 2 DIABETES MELLITUS: Status: ACTIVE | Noted: 2024-11-01

## 2024-11-07 DIAGNOSIS — M79.671 RIGHT FOOT PAIN: Primary | ICD-10-CM

## 2024-11-13 ENCOUNTER — HOSPITAL ENCOUNTER (OUTPATIENT)
Dept: RADIOLOGY | Facility: HOSPITAL | Age: 44
Discharge: HOME | End: 2024-11-13
Attending: ORTHOPAEDIC SURGERY
Payer: COMMERCIAL

## 2024-11-13 ENCOUNTER — OFFICE VISIT (OUTPATIENT)
Dept: ORTHOPEDICS | Facility: CLINIC | Age: 44
End: 2024-11-13
Payer: COMMERCIAL

## 2024-11-13 VITALS
OXYGEN SATURATION: 96 % | HEIGHT: 65 IN | DIASTOLIC BLOOD PRESSURE: 75 MMHG | HEART RATE: 108 BPM | WEIGHT: 189 LBS | BODY MASS INDEX: 31.49 KG/M2 | SYSTOLIC BLOOD PRESSURE: 118 MMHG

## 2024-11-13 DIAGNOSIS — M79.671 RIGHT FOOT PAIN: Primary | ICD-10-CM

## 2024-11-13 DIAGNOSIS — M79.671 RIGHT FOOT PAIN: ICD-10-CM

## 2024-11-13 PROCEDURE — 99214 OFFICE O/P EST MOD 30 MIN: CPT | Mod: PBBFAC,25 | Performed by: ORTHOPAEDIC SURGERY

## 2024-11-13 PROCEDURE — 3078F DIAST BP <80 MM HG: CPT | Mod: CPTII,,, | Performed by: ORTHOPAEDIC SURGERY

## 2024-11-13 PROCEDURE — 99999 PR PBB SHADOW E&M-EST. PATIENT-LVL IV: CPT | Mod: PBBFAC,,, | Performed by: ORTHOPAEDIC SURGERY

## 2024-11-13 PROCEDURE — 1159F MED LIST DOCD IN RCRD: CPT | Mod: CPTII,,, | Performed by: ORTHOPAEDIC SURGERY

## 2024-11-13 PROCEDURE — 3060F POS MICROALBUMINURIA REV: CPT | Mod: CPTII,,, | Performed by: ORTHOPAEDIC SURGERY

## 2024-11-13 PROCEDURE — 4010F ACE/ARB THERAPY RXD/TAKEN: CPT | Mod: CPTII,,, | Performed by: ORTHOPAEDIC SURGERY

## 2024-11-13 PROCEDURE — 73630 X-RAY EXAM OF FOOT: CPT | Mod: TC,RT

## 2024-11-13 PROCEDURE — 3074F SYST BP LT 130 MM HG: CPT | Mod: CPTII,,, | Performed by: ORTHOPAEDIC SURGERY

## 2024-11-13 PROCEDURE — 3046F HEMOGLOBIN A1C LEVEL >9.0%: CPT | Mod: CPTII,,, | Performed by: ORTHOPAEDIC SURGERY

## 2024-11-13 PROCEDURE — 73630 X-RAY EXAM OF FOOT: CPT | Mod: 26,RT,, | Performed by: ORTHOPAEDIC SURGERY

## 2024-11-13 PROCEDURE — 3066F NEPHROPATHY DOC TX: CPT | Mod: CPTII,,, | Performed by: ORTHOPAEDIC SURGERY

## 2024-11-13 PROCEDURE — 99024 POSTOP FOLLOW-UP VISIT: CPT | Mod: ,,, | Performed by: ORTHOPAEDIC SURGERY

## 2024-11-13 NOTE — PROGRESS NOTES
Radiology Interpretation        Patient Name: Latha Palm  Date: 11/13/2024  YOB: 1980  MRN# [id number]        ORDERING DIAGNOSIS:    Encounter Diagnosis   Name Primary?    Right foot pain Yes        Three views right foot AP lateral oblique skeletally mature individual there is a healing fracture of the base of the 4th and 5th metatarsals.  Screw in place across 5th metatarsal early callus forming no bony lesions impression healing fracture right foot 4th and 5th metatarsals               Wilton Davidson MD

## 2024-11-22 ENCOUNTER — HOSPITAL ENCOUNTER (EMERGENCY)
Facility: HOSPITAL | Age: 44
Discharge: HOME | End: 2024-11-22
Payer: COMMERCIAL

## 2024-11-22 VITALS
TEMPERATURE: 98 F | OXYGEN SATURATION: 98 % | HEIGHT: 65 IN | RESPIRATION RATE: 16 BRPM | HEART RATE: 90 BPM | BODY MASS INDEX: 31.49 KG/M2 | SYSTOLIC BLOOD PRESSURE: 123 MMHG | DIASTOLIC BLOOD PRESSURE: 84 MMHG | WEIGHT: 189 LBS
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
## 2024-11-22 DIAGNOSIS — M25.551 RIGHT HIP PAIN: Primary | ICD-10-CM

## 2024-11-22 DIAGNOSIS — R07.81 RIB PAIN ON LEFT SIDE: ICD-10-CM

## 2024-11-22 PROCEDURE — 99284 EMERGENCY DEPT VISIT MOD MDM: CPT | Mod: 25

## 2024-11-22 PROCEDURE — 25000003 PHARM REV CODE 250: Performed by: NURSE PRACTITIONER

## 2024-11-22 RX ORDER — METHOCARBAMOL 500 MG/1
1000 TABLET, FILM COATED ORAL 3 TIMES DAILY
Qty: 30 TABLET | Refills: 0 | Status: SHIPPED | OUTPATIENT
Start: 2024-11-22 | End: 2024-11-27

## 2024-11-22 RX ORDER — HYDROCODONE BITARTRATE AND ACETAMINOPHEN 10; 325 MG/1; MG/1
1 TABLET ORAL
Status: COMPLETED | OUTPATIENT
Start: 2024-11-22 | End: 2024-11-22

## 2024-11-22 RX ORDER — METHOCARBAMOL 500 MG/1
1000 TABLET, FILM COATED ORAL 3 TIMES DAILY
Qty: 30 TABLET | Refills: 0 | Status: SHIPPED | OUTPATIENT
Start: 2024-11-22 | End: 2024-11-22

## 2024-11-22 RX ORDER — HYDROCODONE BITARTRATE AND ACETAMINOPHEN 5; 325 MG/1; MG/1
1 TABLET ORAL EVERY 6 HOURS PRN
Qty: 12 TABLET | Refills: 0 | Status: SHIPPED | OUTPATIENT
Start: 2024-11-22

## 2024-11-22 RX ORDER — HYDROCODONE BITARTRATE AND ACETAMINOPHEN 5; 325 MG/1; MG/1
1 TABLET ORAL EVERY 6 HOURS PRN
Qty: 12 TABLET | Refills: 0 | Status: SHIPPED | OUTPATIENT
Start: 2024-11-22 | End: 2024-11-22

## 2024-11-22 RX ORDER — ONDANSETRON 4 MG/1
4 TABLET, ORALLY DISINTEGRATING ORAL
Status: COMPLETED | OUTPATIENT
Start: 2024-11-22 | End: 2024-11-22

## 2024-11-22 RX ADMIN — ONDANSETRON 4 MG: 4 TABLET, ORALLY DISINTEGRATING ORAL at 05:11

## 2024-11-22 RX ADMIN — HYDROCODONE BITARTRATE AND ACETAMINOPHEN 1 TABLET: 10; 325 TABLET ORAL at 05:11
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
## 2024-11-26 ENCOUNTER — HOSPITAL ENCOUNTER (EMERGENCY)
Facility: HOSPITAL | Age: 44
Discharge: HOME | End: 2024-11-26
Payer: COMMERCIAL

## 2024-11-26 VITALS
OXYGEN SATURATION: 96 % | TEMPERATURE: 98 F | BODY MASS INDEX: 29.99 KG/M2 | SYSTOLIC BLOOD PRESSURE: 134 MMHG | HEIGHT: 65 IN | WEIGHT: 180 LBS | RESPIRATION RATE: 17 BRPM | HEART RATE: 93 BPM | DIASTOLIC BLOOD PRESSURE: 105 MMHG
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
## 2024-11-26 DIAGNOSIS — M62.830 SPASM OF THORACIC BACK MUSCLE: ICD-10-CM

## 2024-11-26 DIAGNOSIS — M62.830 LUMBAR PARASPINAL MUSCLE SPASM: Primary | ICD-10-CM

## 2024-11-26 PROCEDURE — 96372 THER/PROPH/DIAG INJ SC/IM: CPT | Performed by: NURSE PRACTITIONER

## 2024-11-26 PROCEDURE — 99284 EMERGENCY DEPT VISIT MOD MDM: CPT | Mod: 25

## 2024-11-26 PROCEDURE — 63600175 PHARM REV CODE 636 W HCPCS: Performed by: NURSE PRACTITIONER

## 2024-11-26 RX ORDER — IBUPROFEN 800 MG/1
800 TABLET ORAL EVERY 8 HOURS PRN
Qty: 9 TABLET | Refills: 0 | Status: SHIPPED | OUTPATIENT
Start: 2024-11-26 | End: 2024-11-29

## 2024-11-26 RX ORDER — ORPHENADRINE CITRATE 30 MG/ML
30 INJECTION INTRAMUSCULAR; INTRAVENOUS
Status: COMPLETED | OUTPATIENT
Start: 2024-11-26 | End: 2024-11-26

## 2024-11-26 RX ORDER — MENTHOL, UNSPECIFIED FORM 50 MG/ML
1 GEL TOPICAL EVERY 4 HOURS PRN
Qty: 118 ML | Refills: 0 | Status: SHIPPED | OUTPATIENT
Start: 2024-11-26 | End: 2025-11-26

## 2024-11-26 RX ADMIN — ORPHENADRINE CITRATE 30 MG: 30 INJECTION, SOLUTION INTRAMUSCULAR; INTRAVENOUS at 12:11
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
## 2024-11-26 NOTE — DISCHARGE INSTRUCTIONS
Use prescriptions as directed. Alternate Tylenol and Ibuprofen as needed for pain. Continue to use pain medication and muscle relaxers as previously directed. Warm compress in short intervals as needed. Make an appointment with your orthopedic provider if no improvement or if pain persist. Follow up with your primary care provider as needed. Return to the ED for worsening signs and symptoms or otherwise as needed.

## 2024-12-11 ENCOUNTER — HOSPITAL ENCOUNTER (OUTPATIENT)
Dept: RADIOLOGY | Facility: HOSPITAL | Age: 44
Discharge: HOME | End: 2024-12-11
Attending: ORTHOPAEDIC SURGERY
Payer: COMMERCIAL

## 2024-12-11 ENCOUNTER — OFFICE VISIT (OUTPATIENT)
Dept: ORTHOPEDICS | Facility: CLINIC | Age: 44
End: 2024-12-11
Payer: COMMERCIAL

## 2024-12-11 ENCOUNTER — TELEPHONE (OUTPATIENT)
Dept: ORTHOPEDICS | Facility: CLINIC | Age: 44
End: 2024-12-11
Payer: COMMERCIAL

## 2024-12-11 VITALS
HEIGHT: 65 IN | HEART RATE: 97 BPM | DIASTOLIC BLOOD PRESSURE: 78 MMHG | OXYGEN SATURATION: 99 % | WEIGHT: 202.38 LBS | SYSTOLIC BLOOD PRESSURE: 165 MMHG | BODY MASS INDEX: 33.72 KG/M2

## 2024-12-11 DIAGNOSIS — S92.351A CLOSED DISPLACED FRACTURE OF FIFTH METATARSAL BONE OF RIGHT FOOT, INITIAL ENCOUNTER: ICD-10-CM

## 2024-12-11 DIAGNOSIS — M79.671 RIGHT FOOT PAIN: Primary | ICD-10-CM

## 2024-12-11 PROCEDURE — 99214 OFFICE O/P EST MOD 30 MIN: CPT | Mod: PBBFAC,25 | Performed by: ORTHOPAEDIC SURGERY

## 2024-12-11 PROCEDURE — 73630 X-RAY EXAM OF FOOT: CPT | Mod: TC,RT

## 2024-12-11 PROCEDURE — 99999 PR PBB SHADOW E&M-EST. PATIENT-LVL IV: CPT | Mod: PBBFAC,,, | Performed by: ORTHOPAEDIC SURGERY

## 2024-12-11 NOTE — PROGRESS NOTES
Radiology Interpretation        Patient Name: Latha Palm  Date: 12/11/2024  YOB: 1980  MRN# [id number]        ORDERING DIAGNOSIS:    Encounter Diagnoses   Name Primary?    Right foot pain Yes    Closed displaced fracture of fifth metatarsal bone of right foot, initial encounter         Three views right foot skeletally mature individual there is a healing fracture of the base of the 4th metatarsal with healing fracture of the 5th metatarsal of the metaphyseal diaphyseal junction with a screw in place.  Callus starting to form no bony lesions no shift alignment impression healing fracture 4th and 5th metatarsal bases right foot               Wilton Davidson MD

## 2024-12-11 NOTE — TELEPHONE ENCOUNTER
----- Message from Louisa sent at 12/11/2024  3:35 PM CST -----  Regarding: patient call back  Patient came back into the office and requesting her referral for ortho spine she went to ortho spine to get an appointment and no referral she said they told her the soon she gets the referral she can be seen instead of waiting out pass the first of the year because as of now they have a closer appointment date but can't schedule it until she has that referral please call patient to discuss she also would like to speak with the nurse
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
## 2025-01-08 ENCOUNTER — PATIENT OUTREACH (OUTPATIENT)
Facility: HOSPITAL | Age: 45
End: 2025-01-08
Payer: COMMERCIAL

## 2025-01-08 NOTE — PROGRESS NOTES
01/08/2025   --Chart accessed for: [facility]  Next appointment 04/14/2025 . Appointment notes updated to include: due for A1c, diabetes urine, foot exam, lipid panel, mammogram, and BP less than 140/90  Health Maintenance Due   Topic Date Due    Mammogram  Never done    Hemoglobin A1c  04/22/2024    Influenza Vaccine (1) 09/01/2024    COVID-19 Vaccine (1 - 2024-25 season) Never done    Diabetes Urine Screening  01/22/2025    Foot Exam  01/22/2025    Lipid Panel  01/22/2025

## 2025-01-21 ENCOUNTER — HOSPITAL ENCOUNTER (OUTPATIENT)
Dept: RADIOLOGY | Facility: HOSPITAL | Age: 45
Discharge: HOME | End: 2025-01-21
Attending: ORTHOPAEDIC SURGERY
Payer: COMMERCIAL

## 2025-01-21 ENCOUNTER — OFFICE VISIT (OUTPATIENT)
Dept: ORTHOPEDICS | Facility: CLINIC | Age: 45
End: 2025-01-21
Payer: COMMERCIAL

## 2025-01-21 VITALS
OXYGEN SATURATION: 99 % | WEIGHT: 200.31 LBS | BODY MASS INDEX: 33.37 KG/M2 | HEIGHT: 65 IN | SYSTOLIC BLOOD PRESSURE: 127 MMHG | DIASTOLIC BLOOD PRESSURE: 70 MMHG | HEART RATE: 78 BPM

## 2025-01-21 DIAGNOSIS — S92.351A CLOSED DISPLACED FRACTURE OF FIFTH METATARSAL BONE OF RIGHT FOOT, INITIAL ENCOUNTER: Primary | ICD-10-CM

## 2025-01-21 DIAGNOSIS — S92.351A CLOSED DISPLACED FRACTURE OF FIFTH METATARSAL BONE OF RIGHT FOOT, INITIAL ENCOUNTER: ICD-10-CM

## 2025-01-21 PROCEDURE — 99213 OFFICE O/P EST LOW 20 MIN: CPT | Mod: S$PBB,,, | Performed by: ORTHOPAEDIC SURGERY

## 2025-01-21 PROCEDURE — 3074F SYST BP LT 130 MM HG: CPT | Mod: CPTII,,, | Performed by: ORTHOPAEDIC SURGERY

## 2025-01-21 PROCEDURE — 73630 X-RAY EXAM OF FOOT: CPT | Mod: 26,RT,, | Performed by: ORTHOPAEDIC SURGERY

## 2025-01-21 PROCEDURE — 3078F DIAST BP <80 MM HG: CPT | Mod: CPTII,,, | Performed by: ORTHOPAEDIC SURGERY

## 2025-01-21 PROCEDURE — 99999 PR PBB SHADOW E&M-EST. PATIENT-LVL IV: CPT | Mod: PBBFAC,,, | Performed by: ORTHOPAEDIC SURGERY

## 2025-01-21 PROCEDURE — 3008F BODY MASS INDEX DOCD: CPT | Mod: CPTII,,, | Performed by: ORTHOPAEDIC SURGERY

## 2025-01-21 PROCEDURE — 99214 OFFICE O/P EST MOD 30 MIN: CPT | Mod: PBBFAC,25 | Performed by: ORTHOPAEDIC SURGERY

## 2025-01-21 PROCEDURE — 73630 X-RAY EXAM OF FOOT: CPT | Mod: TC,RT

## 2025-01-21 PROCEDURE — 1159F MED LIST DOCD IN RCRD: CPT | Mod: CPTII,,, | Performed by: ORTHOPAEDIC SURGERY

## 2025-01-21 NOTE — PROGRESS NOTES
Radiology Interpretation        Patient Name: Latha Palm  Date: 1/21/2025  YOB: 1980  MRN# [id number]        ORDERING DIAGNOSIS:    Encounter Diagnosis   Name Primary?    Closed displaced fracture of fifth metatarsal bone of right foot, initial encounter Yes                       Wilton Davidson MD                 Three views right foot skeletally mature individual there is a healing fracture of the 5th metatarsal screw in place no loosening of the hardware no shift alignment impression healing fracture right 5th metatarsal

## 2025-01-21 NOTE — PROGRESS NOTES
Patient is here follow-up of the right 5th metatarsal ORIF.  At this time x-rays show she has callus forming.  She is having minimal pain.  No swelling.  Let her weightbear as tolerates.  Follow up as needed.  She is 3 months out from her surgery.  On her left foot she has a hammertoe of her 2nd and 3rd toe 2nd toe was having some breakdown.  I will recheck her in a month for left foot with x-rays.  Once she heals the area over the tip of the toe may consider surgical intervention of the 2nd and 3rd toes

## 2025-01-27 ENCOUNTER — OFFICE VISIT (OUTPATIENT)
Dept: FAMILY MEDICINE | Facility: CLINIC | Age: 45
End: 2025-01-27
Payer: COMMERCIAL

## 2025-01-27 VITALS
OXYGEN SATURATION: 96 % | TEMPERATURE: 100 F | WEIGHT: 197 LBS | DIASTOLIC BLOOD PRESSURE: 80 MMHG | HEART RATE: 111 BPM | SYSTOLIC BLOOD PRESSURE: 161 MMHG | BODY MASS INDEX: 32.82 KG/M2 | HEIGHT: 65 IN

## 2025-01-27 DIAGNOSIS — R11.2 NAUSEA AND VOMITING, UNSPECIFIED VOMITING TYPE: ICD-10-CM

## 2025-01-27 DIAGNOSIS — R68.89 FLU-LIKE SYMPTOMS: ICD-10-CM

## 2025-01-27 DIAGNOSIS — R10.84 GENERALIZED ABDOMINAL PAIN: Primary | ICD-10-CM

## 2025-01-27 LAB
CTP QC/QA: YES
MOLECULAR STREP A: NEGATIVE
POC MOLECULAR INFLUENZA A AGN: NEGATIVE
POC MOLECULAR INFLUENZA B AGN: NEGATIVE
SARS-COV-2 RDRP RESP QL NAA+PROBE: NEGATIVE

## 2025-01-27 PROCEDURE — 87635 SARS-COV-2 COVID-19 AMP PRB: CPT | Mod: QW,,, | Performed by: NURSE PRACTITIONER

## 2025-01-27 PROCEDURE — 1160F RVW MEDS BY RX/DR IN RCRD: CPT | Mod: CPTII,,, | Performed by: NURSE PRACTITIONER

## 2025-01-27 PROCEDURE — 3008F BODY MASS INDEX DOCD: CPT | Mod: CPTII,,, | Performed by: NURSE PRACTITIONER

## 2025-01-27 PROCEDURE — 3077F SYST BP >= 140 MM HG: CPT | Mod: CPTII,,, | Performed by: NURSE PRACTITIONER

## 2025-01-27 PROCEDURE — 99051 MED SERV EVE/WKEND/HOLIDAY: CPT | Mod: ,,, | Performed by: NURSE PRACTITIONER

## 2025-01-27 PROCEDURE — 87502 INFLUENZA DNA AMP PROBE: CPT | Mod: QW,,, | Performed by: NURSE PRACTITIONER

## 2025-01-27 PROCEDURE — 1159F MED LIST DOCD IN RCRD: CPT | Mod: CPTII,,, | Performed by: NURSE PRACTITIONER

## 2025-01-27 PROCEDURE — 3079F DIAST BP 80-89 MM HG: CPT | Mod: CPTII,,, | Performed by: NURSE PRACTITIONER

## 2025-01-27 PROCEDURE — 99214 OFFICE O/P EST MOD 30 MIN: CPT | Mod: ,,, | Performed by: NURSE PRACTITIONER

## 2025-01-27 PROCEDURE — 87651 STREP A DNA AMP PROBE: CPT | Mod: QW,,, | Performed by: NURSE PRACTITIONER

## 2025-01-27 RX ORDER — ONDANSETRON 4 MG/1
4 TABLET, FILM COATED ORAL 2 TIMES DAILY
Qty: 20 TABLET | Refills: 0 | Status: SHIPPED | OUTPATIENT
Start: 2025-01-27

## 2025-01-28 NOTE — PROGRESS NOTES
[facility]  Chief Complaint      Chief Complaint   Patient presents with    Abdominal Pain     Patient is being seen for   Body aches and chills  Abdominal pain  Headache   Nausea   Started last night        History of Present Illness      Latha Palm is a 44 y.o. female who presents today for evaluation of abdominal pain, body aches and chills, headache, and nausea. Onset of symptoms last night.    Abdominal Pain  Associated symptoms include headaches and nausea. Pertinent negatives include no constipation, diarrhea, dysuria, fever, myalgias or vomiting.        Past Medical History:  Past Medical History:   Diagnosis Date    Asthma     Diabetes mellitus     Digestive disorder     Hypertension     Neuropathy     Scoliosis     Seizures     Unspecified osteoarthritis, unspecified site        Past Surgical History:   has a past surgical history that includes Hysterectomy;  section; Dilation and curettage of uterus; and Open reduction and internal fixation (ORIF) of fracture of metatarsal bone (Right, 2024).    Social History:  Social History     Tobacco Use    Smoking status: Former     Types: Cigarettes     Start date:      Passive exposure: Past    Smokeless tobacco: Never   Substance Use Topics    Alcohol use: Not Currently    Drug use: Never       I personally reviewed all past medical, surgical, and social.     Review of Systems   Constitutional:  Positive for chills. Negative for appetite change, fatigue, fever and unexpected weight change.   Respiratory:  Negative for cough, shortness of breath and wheezing.    Cardiovascular:  Negative for chest pain and leg swelling.   Gastrointestinal:  Positive for abdominal pain and nausea. Negative for constipation, diarrhea and vomiting.   Genitourinary:  Negative for difficulty urinating, dysuria and flank pain.   Musculoskeletal:  Negative for myalgias.   Skin:  Negative for color change and rash.   Neurological:  Positive  for headaches. Negative for dizziness, syncope and weakness.        Medications:  Outpatient Encounter Medications as of 1/27/2025   Medication Sig Dispense Refill    albuterol (PROVENTIL) 5 mg/mL nebulizer solution Take 2.5 mg by nebulization every 6 (six) hours as needed for Wheezing. Rescue (Patient not taking: Reported on 1/27/2025)      amitriptyline (ELAVIL) 10 MG tablet Take 1 tablet (10 mg total) by mouth every evening. (Patient not taking: Reported on 1/27/2025) 90 tablet 0    atorvastatin (LIPITOR) 10 MG tablet Take 1 tablet (10 mg total) by mouth once daily. (Patient not taking: Reported on 1/27/2025) 90 tablet 0    budesonide-formoterol 160-4.5 mcg (SYMBICORT) 160-4.5 mcg/actuation HFAA Inhale 2 puffs into the lungs every 12 (twelve) hours. Controller (Patient not taking: Reported on 1/27/2025)      empagliflozin (JARDIANCE) 25 mg tablet Take 1 tablet (25 mg total) by mouth once daily. (Patient not taking: Reported on 1/27/2025) 90 tablet 0    EScitalopram oxalate (LEXAPRO) 20 MG tablet Take 1 tablet (20 mg total) by mouth once daily. (Patient not taking: Reported on 1/27/2025) 90 tablet 0    esomeprazole (NEXIUM) 40 mg GrPS Take 40 mg by mouth before breakfast. (Patient not taking: Reported on 1/27/2025) 90 each 1    famotidine (PEPCID) 40 MG tablet Take 1 tablet (40 mg total) by mouth once daily. (Patient not taking: Reported on 1/27/2025) 90 tablet 1    gabapentin (NEURONTIN) 400 MG capsule Take 1 capsule (400 mg total) by mouth 3 (three) times daily. 270 capsule 0    HYDROcodone-acetaminophen (NORCO) 5-325 mg per tablet Take 1 tablet by mouth every 6 (six) hours as needed for Pain. (Patient not taking: Reported on 1/27/2025) 12 tablet 0    insulin aspart protamine-insulin aspart (NOVOLOG 70/30) 100 unit/mL (70-30) InPn pen Inject 20 Units into the skin 2 (two) times a day. 12 mL 2    insulin detemir U-100 (LEVEMIR) 100 unit/mL injection Inject 10 Units into the skin every evening. (Patient not  taking: Reported on 1/27/2025)      lisinopriL (PRINIVIL,ZESTRIL) 5 MG tablet Take 1 tablet (5 mg total) by mouth once daily. (Patient not taking: Reported on 1/27/2025) 90 tablet 1    menthol (BIOFREEZE, MENTHOL,) 5 % Gel Apply 1 Application topically every 4 (four) hours as needed. (Patient not taking: Reported on 1/27/2025) 118 mL 0    montelukast (SINGULAIR) 10 mg tablet Take 1 tablet (10 mg total) by mouth every evening. (Patient not taking: Reported on 1/27/2025) 90 tablet 0    ondansetron (ZOFRAN) 4 MG tablet Take 1 tablet (4 mg total) by mouth 2 (two) times daily. 20 tablet 0    oxyCODONE-acetaminophen (PERCOCET)  mg per tablet Take 1 tablet by mouth every 6 (six) hours as needed for Pain. (Patient not taking: Reported on 1/27/2025) 28 tablet 0    semaglutide (OZEMPIC) 0.25 mg or 0.5 mg (2 mg/3 mL) pen injector Inject 0.5 mg into the skin every 7 days. (Patient not taking: Reported on 1/27/2025) 3 mL 0     No facility-administered encounter medications on file as of 1/27/2025.       Allergies:  Review of patient's allergies indicates:   Allergen Reactions    Clindamycin Anaphylaxis    Penicillins Swelling    Amoxicillin     Bactrim [sulfamethoxazole-trimethoprim]     Cyclobenzaprine     Morphine     Opioids - morphine analogues     Promethazine     Toradol [ketorolac]     Tramadol        Health Maintenance:  Immunization History   Administered Date(s) Administered    Influenza 12/14/2016, 12/01/2017, 12/11/2018, 11/23/2022    Pneumococcal Conjugate - 20 Valent 12/02/2022    Pneumococcal Polysaccharide - 23 Valent 04/04/2012    Tdap 08/20/2017        Health Maintenance   Topic Date Due    Mammogram  Never done    Hemoglobin A1c  04/22/2024    Influenza Vaccine (1) 09/01/2024    COVID-19 Vaccine (1 - 2024-25 season) Never done    Diabetes Urine Screening  01/22/2025    Foot Exam  01/22/2025    Lipid Panel  01/22/2025    Diabetic Eye Exam  05/01/2025    Low Dose Statin  10/11/2025    TETANUS VACCINE   "08/20/2027    RSV Vaccine (Age 60+ and Pregnant patients) (1 - 1-dose 75+ series) 07/15/2056    Hepatitis C Screening  Completed    HIV Screening  Completed    Pneumococcal Vaccines (Age 0-49)  Completed        Physical Exam      Vital Signs  Temp: 99.7 °F (37.6 °C)  Pulse: (!) 111  SpO2: 96 %  BP: (!) 161/80  Height and Weight  Height: 5' 5" (165.1 cm)  Weight: 89.4 kg (197 lb)  BSA (Calculated - sq m): 2.02 sq meters  BMI (Calculated): 32.8  Weight in (lb) to have BMI = 25: 149.9]    Physical Exam  Constitutional:       Appearance: Normal appearance.   HENT:      Head: Normocephalic.      Mouth/Throat:      Mouth: Mucous membranes are moist.   Cardiovascular:      Rate and Rhythm: Normal rate and regular rhythm.      Heart sounds: Normal heart sounds. No murmur heard.  Pulmonary:      Effort: Pulmonary effort is normal. No respiratory distress.      Breath sounds: Normal breath sounds. No wheezing, rhonchi or rales.   Abdominal:      General: There is no distension.      Tenderness: There is no abdominal tenderness. There is no guarding or rebound.   Musculoskeletal:         General: Normal range of motion.   Skin:     General: Skin is warm and dry.   Neurological:      Mental Status: She is alert and oriented to person, place, and time.          Laboratory:  CBC:  Recent Labs   Lab 12/30/23  0045 01/22/24  1554 09/23/24  1605   WBC 9.16 8.92 9.30   RBC 5.37 5.25 4.60   Hemoglobin 16.6 H 16.1 H 13.7   Hematocrit 48.5 H 46.9 41.2   Platelet Count 408 H 447 H 503 H   MCV 90.3 89.3 89.6   MCH 30.9 30.7 29.8   MCHC 34.2 34.3 33.3       LIPIDS:  Recent Labs   Lab 01/22/24  1554   TSH 2.080   HDL Cholesterol 40   Cholesterol 236 H   Triglycerides 367 H   LDL Calculated 123   Cholesterol/HDL Ratio (Risk Factor) 5.9   Non-       TSH:  Recent Labs   Lab 01/22/24  1554   TSH 2.080       A1C:  Recent Labs   Lab 01/22/24  1554   Hemoglobin A1C 13.4 H       Lab Results   Component Value Date     (H) 09/23/2024    " " (L) 09/23/2024    K 3.7 09/23/2024    CL 99 09/23/2024    CO2 29 09/23/2024    BUN 14 09/23/2024    CREATININE 0.73 09/23/2024    CALCIUM 8.8 09/23/2024    PROT 8.5 (H) 01/22/2024    ALBUMIN 3.5 01/22/2024    BILITOT 0.4 01/22/2024    ALKPHOS 124 (H) 01/22/2024    AST 19 01/22/2024    ALT 38 01/22/2024    ANIONGAP 10 09/23/2024       Lab Results   Component Value Date    WBC 9.30 09/23/2024    RBC 4.60 09/23/2024    HGB 13.7 09/23/2024    HCT 41.2 09/23/2024    MCV 89.6 09/23/2024    RDW 13.6 09/23/2024     (H) 09/23/2024        Lab Results   Component Value Date    CHOL 236 (H) 01/22/2024    TRIG 367 (H) 01/22/2024    HDL 40 01/22/2024    LDLCALC 123 01/22/2024       Lab Results   Component Value Date    TSH 2.080 01/22/2024       Lab Results   Component Value Date    HGBA1C 13.4 (H) 01/22/2024    ESTIMATEDAVG 338 01/22/2024        No components found for: "VITAMIND"    No results found for: "PSA"    Point Of Care Testing:  Nitrites, UA   Date Value Ref Range Status   09/14/2023 Negative Negative Final     Urobilinogen, UA   Date Value Ref Range Status   09/14/2023 0.2 0.2, 1.0, Normal mg/dL Final     pH, UA   Date Value Ref Range Status   09/14/2023 6.0 5.0 to 8.0 pH Units Final     Specific Gravity, UA   Date Value Ref Range Status   09/14/2023 1.025 <=1.005, 1.010, 1.015, 1.020, 1.025, 1.030 Final     Ketones, UA   Date Value Ref Range Status   09/14/2023 15 (A) Negative mg/dL Final       No results found for: "[id number]", "RAPFLUA", "RAPFLUB"      Assessment/Plan     1. Generalized abdominal pain  -     X-Ray KUB; Future; Expected date: 01/27/2025  KUB impression: Abundant stool.     2. Flu-like symptoms  -     POCT COVID-19 Rapid Screening  -     POCT Influenza A/B Molecular  -     POCT Strep A, Molecular    3. Nausea and vomiting, unspecified vomiting type  -     ondansetron (ZOFRAN) 4 MG tablet; Take 1 tablet (4 mg total) by mouth 2 (two) times daily.  Dispense: 20 tablet; Refill: 0     "       Return to clinic if symptoms worsen or fail to improve.    Questions answered to desired level of satisfaction    Verbalized understanding to all information and instructions provided      BENITO [facility]

## 2025-01-29 PROBLEM — R11.2 NAUSEA AND VOMITING: Status: ACTIVE | Noted: 2025-01-29

## 2025-01-29 PROBLEM — R68.89 FLU-LIKE SYMPTOMS: Status: ACTIVE | Noted: 2025-01-29

## 2025-01-29 PROBLEM — R10.84 GENERALIZED ABDOMINAL PAIN: Status: ACTIVE | Noted: 2025-01-29

## 2025-02-23 ENCOUNTER — HOSPITAL ENCOUNTER (EMERGENCY)
Facility: HOSPITAL | Age: 45
Discharge: HOME | End: 2025-02-23
Payer: COMMERCIAL

## 2025-02-23 VITALS
OXYGEN SATURATION: 100 % | DIASTOLIC BLOOD PRESSURE: 97 MMHG | HEIGHT: 65 IN | SYSTOLIC BLOOD PRESSURE: 134 MMHG | BODY MASS INDEX: 32.65 KG/M2 | WEIGHT: 196 LBS | HEART RATE: 79 BPM | TEMPERATURE: 98 F | RESPIRATION RATE: 18 BRPM

## 2025-02-23 DIAGNOSIS — E11.9 DIABETES MELLITUS WITHOUT COMPLICATION: Primary | ICD-10-CM

## 2025-02-23 LAB
ALBUMIN SERPL BCP-MCNC: 3.7 G/DL (ref 3.5–5)
ALBUMIN/GLOB SERPL: 0.9 {RATIO}
ALP SERPL-CCNC: 123 U/L (ref 40–150)
ALT SERPL W P-5'-P-CCNC: 19 U/L
ANION GAP SERPL CALCULATED.3IONS-SCNC: 18 MMOL/L (ref 7–16)
AST SERPL W P-5'-P-CCNC: 31 U/L (ref 5–34)
BASOPHILS # BLD AUTO: 0.03 K/UL (ref 0–0.2)
BASOPHILS NFR BLD AUTO: 0.3 % (ref 0–1)
BILIRUB SERPL-MCNC: 0.5 MG/DL
BUN SERPL-MCNC: 13 MG/DL (ref 7–19)
BUN/CREAT SERPL: 13 (ref 6–20)
CALCIUM SERPL-MCNC: 9.2 MG/DL (ref 8.4–10.2)
CHLORIDE SERPL-SCNC: 93 MMOL/L (ref 98–107)
CO2 SERPL-SCNC: 22 MMOL/L (ref 22–29)
CREAT SERPL-MCNC: 1.03 MG/DL (ref 0.55–1.02)
DIFFERENTIAL METHOD BLD: ABNORMAL
EGFR (NO RACE VARIABLE) (RUSH/TITUS): 69 ML/MIN/1.73M2
EOSINOPHIL # BLD AUTO: 0.09 K/UL (ref 0–0.5)
EOSINOPHIL NFR BLD AUTO: 0.9 % (ref 1–4)
ERYTHROCYTE [DISTWIDTH] IN BLOOD BY AUTOMATED COUNT: 12.4 % (ref 11.5–14.5)
GLOBULIN SER-MCNC: 4.2 G/DL (ref 2–4)
GLUCOSE SERPL-MCNC: 294 MG/DL (ref 70–105)
GLUCOSE SERPL-MCNC: 318 MG/DL (ref 70–105)
GLUCOSE SERPL-MCNC: 566 MG/DL (ref 74–100)
HCT VFR BLD AUTO: 46.4 % (ref 38–47)
HGB BLD-MCNC: 15.3 G/DL (ref 12–16)
IMM GRANULOCYTES # BLD AUTO: 0.05 K/UL (ref 0–0.04)
IMM GRANULOCYTES NFR BLD: 0.5 % (ref 0–0.4)
LYMPHOCYTES # BLD AUTO: 2.68 K/UL (ref 1–4.8)
LYMPHOCYTES NFR BLD AUTO: 26.9 % (ref 27–41)
MCH RBC QN AUTO: 29.4 PG (ref 27–31)
MCHC RBC AUTO-ENTMCNC: 33 G/DL (ref 32–36)
MCV RBC AUTO: 89.1 FL (ref 80–96)
MONOCYTES # BLD AUTO: 0.65 K/UL (ref 0–0.8)
MONOCYTES NFR BLD AUTO: 6.5 % (ref 2–6)
MPC BLD CALC-MCNC: 10.2 FL (ref 9.4–12.4)
NEUTROPHILS # BLD AUTO: 6.46 K/UL (ref 1.8–7.7)
NEUTROPHILS NFR BLD AUTO: 64.9 % (ref 53–65)
NRBC # BLD AUTO: 0 X10E3/UL
NRBC, AUTO (.00): 0 %
PLATELET # BLD AUTO: 396 K/UL (ref 150–400)
POTASSIUM SERPL-SCNC: 4.4 MMOL/L (ref 3.5–5.1)
PROT SERPL-MCNC: 7.9 G/DL (ref 6.4–8.3)
RBC # BLD AUTO: 5.21 M/UL (ref 4.2–5.4)
SODIUM SERPL-SCNC: 129 MMOL/L (ref 136–145)
WBC # BLD AUTO: 9.96 K/UL (ref 4.5–11)

## 2025-02-23 PROCEDURE — 63600175 PHARM REV CODE 636 W HCPCS: Performed by: NURSE PRACTITIONER

## 2025-02-23 PROCEDURE — 99284 EMERGENCY DEPT VISIT MOD MDM: CPT | Mod: 25

## 2025-02-23 PROCEDURE — 80053 COMPREHEN METABOLIC PANEL: CPT | Performed by: NURSE PRACTITIONER

## 2025-02-23 PROCEDURE — 85025 COMPLETE CBC W/AUTO DIFF WBC: CPT | Performed by: NURSE PRACTITIONER

## 2025-02-23 PROCEDURE — 96372 THER/PROPH/DIAG INJ SC/IM: CPT | Performed by: NURSE PRACTITIONER

## 2025-02-23 PROCEDURE — 96361 HYDRATE IV INFUSION ADD-ON: CPT

## 2025-02-23 PROCEDURE — 96374 THER/PROPH/DIAG INJ IV PUSH: CPT

## 2025-02-23 PROCEDURE — 36415 COLL VENOUS BLD VENIPUNCTURE: CPT | Performed by: NURSE PRACTITIONER

## 2025-02-23 PROCEDURE — 82962 GLUCOSE BLOOD TEST: CPT

## 2025-02-23 PROCEDURE — 25000003 PHARM REV CODE 250: Performed by: NURSE PRACTITIONER

## 2025-02-23 RX ORDER — SODIUM CHLORIDE 9 MG/ML
1000 INJECTION, SOLUTION INTRAVENOUS
Status: COMPLETED | OUTPATIENT
Start: 2025-02-23 | End: 2025-02-23

## 2025-02-23 RX ORDER — INSULIN GLARGINE 100 [IU]/ML
20 INJECTION, SOLUTION SUBCUTANEOUS NIGHTLY
Qty: 30 ML | Refills: 0 | Status: SHIPPED | OUTPATIENT
Start: 2025-02-23

## 2025-02-23 RX ORDER — DIPHENHYDRAMINE HYDROCHLORIDE 50 MG/ML
25 INJECTION INTRAMUSCULAR; INTRAVENOUS
Status: COMPLETED | OUTPATIENT
Start: 2025-02-23 | End: 2025-02-23

## 2025-02-23 RX ORDER — FAMOTIDINE 20 MG/1
20 TABLET, FILM COATED ORAL
Status: COMPLETED | OUTPATIENT
Start: 2025-02-23 | End: 2025-02-23

## 2025-02-23 RX ADMIN — SODIUM CHLORIDE 1000 ML: 9 INJECTION, SOLUTION INTRAVENOUS at 12:02

## 2025-02-23 RX ADMIN — FAMOTIDINE 20 MG: 20 TABLET, FILM COATED ORAL at 09:02

## 2025-02-23 RX ADMIN — HUMAN INSULIN 4 UNITS: 100 INJECTION, SOLUTION SUBCUTANEOUS at 11:02

## 2025-02-23 RX ADMIN — SODIUM CHLORIDE 1000 ML: 9 INJECTION, SOLUTION INTRAVENOUS at 11:02

## 2025-02-23 RX ADMIN — DIPHENHYDRAMINE HYDROCHLORIDE 25 MG: 50 INJECTION INTRAMUSCULAR; INTRAVENOUS at 09:02

## 2025-02-23 NOTE — PROVIDER PROGRESS NOTES - EMERGENCY DEPT.
Encounter Date: 2/23/2025    ED Physician Progress Notes        Pt reports the itching has resolved and she is feeling better.

## 2025-02-23 NOTE — ED TRIAGE NOTES
"Pt presents to the ED with c/o eye pain, headache, abdominal pain with "slimey" bowel movements for the past week and a rash that started last night.   "

## 2025-02-23 NOTE — ED NOTES
Attempted IV per Annie Valerio RN and Giuliana Olivo RN, unsuccessful.  Kristina Mejia RN in to attempt ultrasound IV.

## 2025-02-23 NOTE — ED PROVIDER NOTES
Encounter Date: 2025       History     Chief Complaint   Patient presents with    Abdominal Pain    Rash    Eye Pain    Headache     Pt presents with a headache, constipation, and itchy painful rash to back and chest area. Pt states she is diabetic but she stopped taking all of her medications.         Review of patient's allergies indicates:   Allergen Reactions    Clindamycin Anaphylaxis    Penicillins Swelling    Amoxicillin     Bactrim [sulfamethoxazole-trimethoprim]     Cyclobenzaprine     Morphine     Opioids - morphine analogues     Promethazine     Toradol [ketorolac]     Tramadol      Past Medical History:   Diagnosis Date    Asthma     Diabetes mellitus     Digestive disorder     Hypertension     Neuropathy     Scoliosis     Seizures     Unspecified osteoarthritis, unspecified site      Past Surgical History:   Procedure Laterality Date     SECTION      DILATION AND CURETTAGE OF UTERUS      HYSTERECTOMY      OPEN REDUCTION AND INTERNAL FIXATION (ORIF) OF FRACTURE OF METATARSAL BONE Right 2024    Procedure: ORIF, FRACTURE, RIGHT 5TH METATARSAL BONE;  Surgeon: Wilton Davidson MD;  Location: [facility];  Service: Orthopedics;  Laterality: Right;     No family history on file.  Social History[1]  Review of Systems   Constitutional:  Negative for fever.   HENT:  Negative for sore throat.    Respiratory:  Negative for shortness of breath.    Cardiovascular:  Negative for chest pain.   Gastrointestinal:  Positive for constipation. Negative for nausea.   Genitourinary:  Negative for dysuria.   Musculoskeletal:  Negative for back pain.   Skin:  Positive for rash.   Neurological:  Positive for headaches. Negative for weakness.   Hematological:  Does not bruise/bleed easily.   Psychiatric/Behavioral:  Negative for behavioral problems.        Physical Exam     Initial Vitals [25 0847]   BP Pulse Resp Temp SpO2   134/86 (!) 128 20 98.3 °F (36.8 °C) 95 %      MAP       --          Physical Exam    Nursing note and vitals reviewed.  Constitutional: She appears well-developed.   Cardiovascular:  Normal rate and regular rhythm.           Pulmonary/Chest: Breath sounds normal.   Musculoskeletal:         General: Normal range of motion.     Neurological: She is alert and oriented to person, place, and time.   Skin:        Erythematous rash noted    Psychiatric: She has a normal mood and affect. Thought content normal.         Medical Screening Exam   See Full Note    ED Course   Procedures  Labs Reviewed   COMPREHENSIVE METABOLIC PANEL - Abnormal       Result Value    Sodium 129 (*)     Potassium 4.4      Chloride 93 (*)     CO2 22      Anion Gap 18 (*)     Glucose 566 (*)     BUN 13      Creatinine 1.03 (*)     BUN/Creatinine Ratio 13      Calcium 9.2      Total Protein 7.9      Albumin 3.7      Globulin 4.2 (*)     A/G Ratio 0.9      Bilirubin, Total 0.5      Alk Phos 123      ALT 19      AST 31      eGFR 69     CBC WITH DIFFERENTIAL - Abnormal    WBC 9.96      RBC 5.21      Hemoglobin 15.3      Hematocrit 46.4      MCV 89.1      MCH 29.4      MCHC 33.0      RDW 12.4      Platelet Count 396      MPV 10.2      Neutrophils % 64.9      Lymphocytes % 26.9 (*)     Monocytes % 6.5 (*)     Eosinophils % 0.9 (*)     Basophils % 0.3      Immature Granulocytes % 0.5 (*)     nRBC, Auto 0.0      Neutrophils, Abs 6.46      Lymphocytes, Absolute 2.68      Monocytes, Absolute 0.65      Eosinophils, Absolute 0.09      Basophils, Absolute 0.03      Immature Granulocytes, Absolute 0.05 (*)     nRBC, Absolute 0.00      Diff Type Auto     POCT GLUCOSE MONITORING CONTINUOUS - Abnormal    POC Glucose 318 (*)    CBC W/ AUTO DIFFERENTIAL    Narrative:     The following orders were created for panel order CBC auto differential.  Procedure                               Abnormality         Status                     ---------                               -----------         ------                     CBC with  Differential[[id number]]       Abnormal            Final result                 Please view results for these tests on the individual orders.   POCT GLUCOSE MONITORING CONTINUOUS          Imaging Results    None          Medications   famotidine tablet 20 mg (20 mg Oral Given 2/23/25 0911)   diphenhydrAMINE injection 25 mg (25 mg Intramuscular Given 2/23/25 0912)   0.9% NaCl infusion (0 mLs Intravenous Stopped 2/23/25 1223)   insulin regular injection 4 Units 0.04 mL (4 Units Intravenous Given 2/23/25 1137)   0.9% NaCl infusion (1,000 mLs Intravenous New Bag 2/23/25 1227)     Medical Decision Making  Pt presents with a headache, constipation, and itchy painful rash to back and chest area. Pt states she is diabetic but she stopped taking all of her medications.     Amount and/or Complexity of Data Reviewed  Labs: ordered.     Details: Glucose-566, cbc normal    Risk  OTC drugs.  Prescription drug management.                                      Clinical Impression:   Final diagnoses:  [E11.9] Diabetes mellitus without complication (Primary)        ED Disposition Condition    Discharge Stable          ED Prescriptions       Medication Sig Dispense Start Date End Date Auth. Provider    insulin glargine U-100, Lantus, (LANTUS SOLOSTAR U-100 INSULIN) 100 unit/mL (3 mL) InPn pen Inject 20 Units into the skin every evening. 30 mL 2/23/2025 -- Leticia Irizarry FNP          Follow-up Information    None              [1]   Social History  Tobacco Use    Smoking status: Former     Types: Cigarettes     Start date: 1999     Passive exposure: Past    Smokeless tobacco: Never   Substance Use Topics    Alcohol use: Not Currently    Drug use: Never        Leticia Irizarry FNP  02/23/25 1302

## 2025-02-24 LAB — GLUCOSE SERPL-MCNC: 471 MG/DL (ref 70–105)

## 2025-06-18 ENCOUNTER — HOSPITAL ENCOUNTER (INPATIENT)
Facility: HOSPITAL | Age: 45
LOS: 3 days | Discharge: HOME | DRG: 854 | End: 2025-06-22
Attending: HOSPITALIST | Admitting: HOSPITALIST
Payer: COMMERCIAL

## 2025-06-18 DIAGNOSIS — A41.9 SEPSIS, DUE TO UNSPECIFIED ORGANISM, UNSPECIFIED WHETHER ACUTE ORGAN DYSFUNCTION PRESENT: ICD-10-CM

## 2025-06-18 DIAGNOSIS — E11.628 DIABETIC INFECTION OF RIGHT FOOT: ICD-10-CM

## 2025-06-18 DIAGNOSIS — T14.90XA INJURY: ICD-10-CM

## 2025-06-18 DIAGNOSIS — L03.115 CELLULITIS OF RIGHT FOOT: ICD-10-CM

## 2025-06-18 DIAGNOSIS — L03.031 CELLULITIS OF TOE OF RIGHT FOOT: ICD-10-CM

## 2025-06-18 DIAGNOSIS — L08.9 DIABETIC INFECTION OF RIGHT FOOT: ICD-10-CM

## 2025-06-18 DIAGNOSIS — Z01.818 PRE-OP EVALUATION: Primary | ICD-10-CM

## 2025-06-18 PROBLEM — E11.42 DM TYPE 2 WITH DIABETIC PERIPHERAL NEUROPATHY: Status: ACTIVE | Noted: 2025-06-18

## 2025-06-18 PROBLEM — Z79.4 TYPE 2 DIABETES MELLITUS, WITH LONG-TERM CURRENT USE OF INSULIN: Status: ACTIVE | Noted: 2024-01-22

## 2025-06-18 PROBLEM — J45.909 REACTIVE AIRWAY DISEASE WITHOUT COMPLICATION: Status: ACTIVE | Noted: 2025-06-18

## 2025-06-18 LAB
APTT PPP: 32.9 SECONDS (ref 25.2–37.3)
INR BLD: 1
PROTHROMBIN TIME: 13.3 SECONDS (ref 11.7–14.7)

## 2025-06-18 PROCEDURE — 80053 COMPREHEN METABOLIC PANEL: CPT | Performed by: NURSE PRACTITIONER

## 2025-06-18 PROCEDURE — 93005 ELECTROCARDIOGRAM TRACING: CPT

## 2025-06-18 PROCEDURE — 85025 COMPLETE CBC W/AUTO DIFF WBC: CPT | Performed by: NURSE PRACTITIONER

## 2025-06-18 PROCEDURE — 87040 BLOOD CULTURE FOR BACTERIA: CPT | Performed by: NURSE PRACTITIONER

## 2025-06-18 PROCEDURE — 83605 ASSAY OF LACTIC ACID: CPT | Performed by: NURSE PRACTITIONER

## 2025-06-18 PROCEDURE — 93010 ELECTROCARDIOGRAM REPORT: CPT | Mod: ,,, | Performed by: INTERNAL MEDICINE

## 2025-06-18 PROCEDURE — 86141 C-REACTIVE PROTEIN HS: CPT | Performed by: NURSE PRACTITIONER

## 2025-06-18 PROCEDURE — 85730 THROMBOPLASTIN TIME PARTIAL: CPT | Performed by: NURSE PRACTITIONER

## 2025-06-18 PROCEDURE — 99223 1ST HOSP IP/OBS HIGH 75: CPT | Mod: ,,, | Performed by: INTERNAL MEDICINE

## 2025-06-18 PROCEDURE — 85610 PROTHROMBIN TIME: CPT | Performed by: NURSE PRACTITIONER

## 2025-06-19 ENCOUNTER — ANESTHESIA EVENT (OUTPATIENT)
Dept: SURGERY | Facility: HOSPITAL | Age: 45
End: 2025-06-19
Payer: COMMERCIAL

## 2025-06-19 ENCOUNTER — ANESTHESIA (OUTPATIENT)
Dept: SURGERY | Facility: HOSPITAL | Age: 45
End: 2025-06-19
Payer: COMMERCIAL

## 2025-06-19 PROBLEM — L03.031 CELLULITIS OF TOE OF RIGHT FOOT: Status: ACTIVE | Noted: 2025-06-19

## 2025-06-19 PROBLEM — L08.9 DIABETIC INFECTION OF RIGHT FOOT: Status: ACTIVE | Noted: 2025-06-19

## 2025-06-19 PROBLEM — L03.115 CELLULITIS OF RIGHT FOOT: Status: ACTIVE | Noted: 2025-06-19

## 2025-06-19 PROBLEM — E11.628 DIABETIC INFECTION OF RIGHT FOOT: Status: ACTIVE | Noted: 2025-06-19

## 2025-06-19 LAB
ALBUMIN SERPL BCP-MCNC: 2.9 G/DL (ref 3.5–5)
ALBUMIN/GLOB SERPL: 0.5 {RATIO}
ALP SERPL-CCNC: 138 U/L (ref 40–150)
ALT SERPL W P-5'-P-CCNC: 21 U/L
ANION GAP SERPL CALCULATED.3IONS-SCNC: 13 MMOL/L (ref 7–16)
AST SERPL W P-5'-P-CCNC: 22 U/L (ref 11–45)
BACTERIA #/AREA URNS HPF: ABNORMAL /HPF
BASOPHILS # BLD AUTO: 0.04 K/UL (ref 0–0.2)
BASOPHILS NFR BLD AUTO: 0.3 % (ref 0–1)
BILIRUB SERPL-MCNC: 0.4 MG/DL
BILIRUB UR QL STRIP: NEGATIVE
BUN SERPL-MCNC: 16 MG/DL (ref 7–19)
BUN/CREAT SERPL: 22 (ref 6–20)
CALCIUM SERPL-MCNC: 9.3 MG/DL (ref 8.4–10.2)
CHLORIDE SERPL-SCNC: 102 MMOL/L (ref 98–107)
CLARITY UR: CLEAR
CO2 SERPL-SCNC: 24 MMOL/L (ref 22–29)
COLOR UR: YELLOW
CREAT SERPL-MCNC: 0.72 MG/DL (ref 0.55–1.02)
CRP SERPL HS-MCNC: 171.56 MG/L
DIFFERENTIAL METHOD BLD: ABNORMAL
EGFR (NO RACE VARIABLE) (RUSH/TITUS): 107 ML/MIN/1.73M2
EOSINOPHIL # BLD AUTO: 0.18 K/UL (ref 0–0.5)
EOSINOPHIL NFR BLD AUTO: 1.2 % (ref 1–4)
ERYTHROCYTE [DISTWIDTH] IN BLOOD BY AUTOMATED COUNT: 13.2 % (ref 11.5–14.5)
GLOBULIN SER-MCNC: 5.3 G/DL (ref 2–4)
GLUCOSE SERPL-MCNC: 102 MG/DL (ref 70–105)
GLUCOSE SERPL-MCNC: 117 MG/DL (ref 74–100)
GLUCOSE SERPL-MCNC: 118 MG/DL (ref 70–105)
GLUCOSE SERPL-MCNC: 124 MG/DL (ref 70–105)
GLUCOSE SERPL-MCNC: 200 MG/DL (ref 70–105)
GLUCOSE SERPL-MCNC: 75 MG/DL (ref 70–105)
GLUCOSE SERPL-MCNC: 96 MG/DL (ref 70–105)
GLUCOSE UR STRIP-MCNC: >1000 MG/DL
HCT VFR BLD AUTO: 39.3 % (ref 38–47)
HGB BLD-MCNC: 13 G/DL (ref 12–16)
IMM GRANULOCYTES # BLD AUTO: 0.08 K/UL (ref 0–0.04)
IMM GRANULOCYTES NFR BLD: 0.5 % (ref 0–0.4)
KETONES UR STRIP-SCNC: NEGATIVE MG/DL
LACTATE SERPL-SCNC: 0.9 MMOL/L (ref 0.5–2.2)
LEUKOCYTE ESTERASE UR QL STRIP: NEGATIVE
LYMPHOCYTES # BLD AUTO: 3.25 K/UL (ref 1–4.8)
LYMPHOCYTES NFR BLD AUTO: 21.9 % (ref 27–41)
LYMPHOCYTES NFR BLD MANUAL: 25 % (ref 27–41)
MCH RBC QN AUTO: 29.5 PG (ref 27–31)
MCHC RBC AUTO-ENTMCNC: 33.1 G/DL (ref 32–36)
MCV RBC AUTO: 89.1 FL (ref 80–96)
MONOCYTES # BLD AUTO: 1.03 K/UL (ref 0–0.8)
MONOCYTES NFR BLD AUTO: 6.9 % (ref 2–6)
MONOCYTES NFR BLD MANUAL: 5 % (ref 2–6)
MPC BLD CALC-MCNC: 8.9 FL (ref 9.4–12.4)
MUCOUS, UA: ABNORMAL /LPF
NEUTROPHILS # BLD AUTO: 10.26 K/UL (ref 1.8–7.7)
NEUTROPHILS NFR BLD AUTO: 69.2 % (ref 53–65)
NEUTS BAND NFR BLD MANUAL: 3 % (ref 1–5)
NEUTS SEG NFR BLD MANUAL: 67 % (ref 50–62)
NITRITE UR QL STRIP: NEGATIVE
NRBC # BLD AUTO: 0 X10E3/UL
NRBC, AUTO (.00): 0 %
PH UR STRIP: 5.5 PH UNITS
PLATELET # BLD AUTO: 539 K/UL (ref 150–400)
PLATELET MORPHOLOGY: ABNORMAL
POTASSIUM SERPL-SCNC: 3.8 MMOL/L (ref 3.5–5.1)
PROT SERPL-MCNC: 8.2 G/DL (ref 6.4–8.3)
PROT UR QL STRIP: 30
RBC # BLD AUTO: 4.41 M/UL (ref 4.2–5.4)
RBC # UR STRIP: ABNORMAL /UL
RBC #/AREA URNS HPF: 5 /HPF
RBC MORPH BLD: NORMAL
SARS-COV-2 RDRP RESP QL NAA+PROBE: NEGATIVE
SODIUM SERPL-SCNC: 135 MMOL/L (ref 136–145)
SP GR UR STRIP: 1.04
SQUAMOUS #/AREA URNS LPF: ABNORMAL /HPF
UROBILINOGEN UR STRIP-ACNC: NORMAL MG/DL
WBC # BLD AUTO: 14.84 K/UL (ref 4.5–11)
WBC #/AREA URNS HPF: 4 /HPF

## 2025-06-19 PROCEDURE — 28820 AMPUTATION OF TOE: CPT | Mod: T5,,, | Performed by: STUDENT IN AN ORGANIZED HEALTH CARE EDUCATION/TRAINING PROGRAM

## 2025-06-19 PROCEDURE — 25000242 PHARM REV CODE 250 ALT 637 W/ HCPCS: Performed by: STUDENT IN AN ORGANIZED HEALTH CARE EDUCATION/TRAINING PROGRAM

## 2025-06-19 PROCEDURE — 88305 TISSUE EXAM BY PATHOLOGIST: CPT | Mod: TC,SUR | Performed by: STUDENT IN AN ORGANIZED HEALTH CARE EDUCATION/TRAINING PROGRAM

## 2025-06-19 PROCEDURE — 63600175 PHARM REV CODE 636 W HCPCS: Performed by: INTERNAL MEDICINE

## 2025-06-19 PROCEDURE — 99285 EMERGENCY DEPT VISIT HI MDM: CPT | Mod: 25

## 2025-06-19 PROCEDURE — 63600175 PHARM REV CODE 636 W HCPCS: Performed by: STUDENT IN AN ORGANIZED HEALTH CARE EDUCATION/TRAINING PROGRAM

## 2025-06-19 PROCEDURE — 94799 UNLISTED PULMONARY SVC/PX: CPT

## 2025-06-19 PROCEDURE — 27000655: Performed by: NURSE ANESTHETIST, CERTIFIED REGISTERED

## 2025-06-19 PROCEDURE — 0Y6P0Z0 DETACHMENT AT RIGHT 1ST TOE, COMPLETE, OPEN APPROACH: ICD-10-PCS | Performed by: STUDENT IN AN ORGANIZED HEALTH CARE EDUCATION/TRAINING PROGRAM

## 2025-06-19 PROCEDURE — 94761 N-INVAS EAR/PLS OXIMETRY MLT: CPT

## 2025-06-19 PROCEDURE — 27000177 HC AIRWAY, LARYNGEAL MASK: Performed by: NURSE ANESTHETIST, CERTIFIED REGISTERED

## 2025-06-19 PROCEDURE — 87077 CULTURE AEROBIC IDENTIFY: CPT | Performed by: STUDENT IN AN ORGANIZED HEALTH CARE EDUCATION/TRAINING PROGRAM

## 2025-06-19 PROCEDURE — 27000510 HC BLANKET BAIR HUGGER ANY SIZE: Performed by: NURSE ANESTHETIST, CERTIFIED REGISTERED

## 2025-06-19 PROCEDURE — 87635 SARS-COV-2 COVID-19 AMP PRB: CPT | Performed by: INTERNAL MEDICINE

## 2025-06-19 PROCEDURE — 94640 AIRWAY INHALATION TREATMENT: CPT

## 2025-06-19 PROCEDURE — 87076 CULTURE ANAEROBE IDENT EACH: CPT | Performed by: STUDENT IN AN ORGANIZED HEALTH CARE EDUCATION/TRAINING PROGRAM

## 2025-06-19 PROCEDURE — 11000001 HC ACUTE MED/SURG PRIVATE ROOM

## 2025-06-19 PROCEDURE — 81001 URINALYSIS AUTO W/SCOPE: CPT | Performed by: INTERNAL MEDICINE

## 2025-06-19 PROCEDURE — 63600175 PHARM REV CODE 636 W HCPCS: Performed by: NURSE ANESTHETIST, CERTIFIED REGISTERED

## 2025-06-19 PROCEDURE — 25000003 PHARM REV CODE 250: Performed by: HOSPITALIST

## 2025-06-19 PROCEDURE — 37000008 HC ANESTHESIA 1ST 15 MINUTES: Performed by: STUDENT IN AN ORGANIZED HEALTH CARE EDUCATION/TRAINING PROGRAM

## 2025-06-19 PROCEDURE — 99223 1ST HOSP IP/OBS HIGH 75: CPT | Mod: 25,,, | Performed by: STUDENT IN AN ORGANIZED HEALTH CARE EDUCATION/TRAINING PROGRAM

## 2025-06-19 PROCEDURE — 36000707: Performed by: STUDENT IN AN ORGANIZED HEALTH CARE EDUCATION/TRAINING PROGRAM

## 2025-06-19 PROCEDURE — 25000003 PHARM REV CODE 250: Performed by: NURSE ANESTHETIST, CERTIFIED REGISTERED

## 2025-06-19 PROCEDURE — 25000003 PHARM REV CODE 250: Performed by: STUDENT IN AN ORGANIZED HEALTH CARE EDUCATION/TRAINING PROGRAM

## 2025-06-19 PROCEDURE — 27000716 HC OXISENSOR PROBE, ANY SIZE: Performed by: NURSE ANESTHETIST, CERTIFIED REGISTERED

## 2025-06-19 PROCEDURE — 37000009 HC ANESTHESIA EA ADD 15 MINS: Performed by: STUDENT IN AN ORGANIZED HEALTH CARE EDUCATION/TRAINING PROGRAM

## 2025-06-19 PROCEDURE — 99233 SBSQ HOSP IP/OBS HIGH 50: CPT | Mod: ,,, | Performed by: HOSPITALIST

## 2025-06-19 PROCEDURE — 82962 GLUCOSE BLOOD TEST: CPT

## 2025-06-19 PROCEDURE — 99900035 HC TECH TIME PER 15 MIN (STAT)

## 2025-06-19 PROCEDURE — 25000003 PHARM REV CODE 250: Performed by: INTERNAL MEDICINE

## 2025-06-19 PROCEDURE — 71000033 HC RECOVERY, INTIAL HOUR: Performed by: STUDENT IN AN ORGANIZED HEALTH CARE EDUCATION/TRAINING PROGRAM

## 2025-06-19 PROCEDURE — 36000706: Performed by: STUDENT IN AN ORGANIZED HEALTH CARE EDUCATION/TRAINING PROGRAM

## 2025-06-19 PROCEDURE — 96372 THER/PROPH/DIAG INJ SC/IM: CPT | Performed by: INTERNAL MEDICINE

## 2025-06-19 RX ORDER — MIDAZOLAM HYDROCHLORIDE 1 MG/ML
INJECTION INTRAMUSCULAR; INTRAVENOUS
Status: DISCONTINUED | OUTPATIENT
Start: 2025-06-19 | End: 2025-06-19

## 2025-06-19 RX ORDER — ONDANSETRON HYDROCHLORIDE 2 MG/ML
4 INJECTION, SOLUTION INTRAVENOUS ONCE
Status: DISCONTINUED | OUTPATIENT
Start: 2025-06-19 | End: 2025-06-22 | Stop reason: HOSPADM

## 2025-06-19 RX ORDER — ONDANSETRON HYDROCHLORIDE 2 MG/ML
4 INJECTION, SOLUTION INTRAVENOUS EVERY 6 HOURS PRN
Status: DISCONTINUED | OUTPATIENT
Start: 2025-06-19 | End: 2025-06-22 | Stop reason: HOSPADM

## 2025-06-19 RX ORDER — GLUCAGON 1 MG
1 KIT INJECTION
Status: DISCONTINUED | OUTPATIENT
Start: 2025-06-19 | End: 2025-06-22 | Stop reason: HOSPADM

## 2025-06-19 RX ORDER — FENTANYL CITRATE 50 UG/ML
INJECTION, SOLUTION INTRAMUSCULAR; INTRAVENOUS
Status: DISCONTINUED | OUTPATIENT
Start: 2025-06-19 | End: 2025-06-19

## 2025-06-19 RX ORDER — ONDANSETRON HYDROCHLORIDE 2 MG/ML
INJECTION, SOLUTION INTRAVENOUS
Status: DISCONTINUED | OUTPATIENT
Start: 2025-06-19 | End: 2025-06-19

## 2025-06-19 RX ORDER — DIPHENHYDRAMINE HCL 25 MG
25 CAPSULE ORAL EVERY 6 HOURS PRN
Status: DISCONTINUED | OUTPATIENT
Start: 2025-06-19 | End: 2025-06-22 | Stop reason: HOSPADM

## 2025-06-19 RX ORDER — PROPOFOL 10 MG/ML
VIAL (ML) INTRAVENOUS
Status: DISCONTINUED | OUTPATIENT
Start: 2025-06-19 | End: 2025-06-19

## 2025-06-19 RX ORDER — IBUPROFEN 200 MG
16 TABLET ORAL
Status: DISCONTINUED | OUTPATIENT
Start: 2025-06-19 | End: 2025-06-22 | Stop reason: HOSPADM

## 2025-06-19 RX ORDER — SODIUM CHLORIDE 9 MG/ML
INJECTION, SOLUTION INTRAVENOUS CONTINUOUS
Status: DISCONTINUED | OUTPATIENT
Start: 2025-06-19 | End: 2025-06-22 | Stop reason: HOSPADM

## 2025-06-19 RX ORDER — IPRATROPIUM BROMIDE AND ALBUTEROL SULFATE 2.5; .5 MG/3ML; MG/3ML
3 SOLUTION RESPIRATORY (INHALATION) EVERY 6 HOURS
Status: DISCONTINUED | OUTPATIENT
Start: 2025-06-19 | End: 2025-06-19

## 2025-06-19 RX ORDER — IBUPROFEN 200 MG
24 TABLET ORAL
Status: DISCONTINUED | OUTPATIENT
Start: 2025-06-19 | End: 2025-06-22 | Stop reason: HOSPADM

## 2025-06-19 RX ORDER — HYDROMORPHONE HYDROCHLORIDE 2 MG/ML
0.5 INJECTION, SOLUTION INTRAMUSCULAR; INTRAVENOUS; SUBCUTANEOUS
Status: DISCONTINUED | OUTPATIENT
Start: 2025-06-19 | End: 2025-06-22 | Stop reason: HOSPADM

## 2025-06-19 RX ORDER — MEROPENEM 1 G/1
1 INJECTION, POWDER, FOR SOLUTION INTRAVENOUS
Status: DISCONTINUED | OUTPATIENT
Start: 2025-06-19 | End: 2025-06-21

## 2025-06-19 RX ORDER — LIDOCAINE HYDROCHLORIDE 20 MG/ML
INJECTION, SOLUTION EPIDURAL; INFILTRATION; INTRACAUDAL; PERINEURAL
Status: DISCONTINUED | OUTPATIENT
Start: 2025-06-19 | End: 2025-06-19

## 2025-06-19 RX ORDER — PROCHLORPERAZINE EDISYLATE 5 MG/ML
5 INJECTION INTRAMUSCULAR; INTRAVENOUS EVERY 6 HOURS PRN
Status: DISCONTINUED | OUTPATIENT
Start: 2025-06-19 | End: 2025-06-22 | Stop reason: HOSPADM

## 2025-06-19 RX ORDER — CETIRIZINE HYDROCHLORIDE 10 MG/1
10 TABLET ORAL NIGHTLY
COMMUNITY

## 2025-06-19 RX ORDER — ESOMEPRAZOLE MAGNESIUM 40 MG/1
40 CAPSULE, DELAYED RELEASE ORAL EVERY MORNING
COMMUNITY

## 2025-06-19 RX ORDER — OXYCODONE AND ACETAMINOPHEN 5; 325 MG/1; MG/1
1 TABLET ORAL EVERY 6 HOURS PRN
Status: DISCONTINUED | OUTPATIENT
Start: 2025-06-19 | End: 2025-06-22 | Stop reason: HOSPADM

## 2025-06-19 RX ORDER — INSULIN GLARGINE 100 [IU]/ML
20 INJECTION, SOLUTION SUBCUTANEOUS NIGHTLY
Status: DISCONTINUED | OUTPATIENT
Start: 2025-06-19 | End: 2025-06-22 | Stop reason: HOSPADM

## 2025-06-19 RX ORDER — INSULIN ASPART 100 [IU]/ML
20 INJECTION, SOLUTION INTRAVENOUS; SUBCUTANEOUS
COMMUNITY
Start: 2025-04-29

## 2025-06-19 RX ORDER — GABAPENTIN 400 MG/1
400 CAPSULE ORAL 3 TIMES DAILY
Status: DISCONTINUED | OUTPATIENT
Start: 2025-06-19 | End: 2025-06-22 | Stop reason: HOSPADM

## 2025-06-19 RX ORDER — INSULIN DEGLUDEC 100 U/ML
35 INJECTION, SOLUTION SUBCUTANEOUS NIGHTLY
COMMUNITY
Start: 2025-04-29

## 2025-06-19 RX ORDER — IPRATROPIUM BROMIDE AND ALBUTEROL SULFATE 2.5; .5 MG/3ML; MG/3ML
3 SOLUTION RESPIRATORY (INHALATION) EVERY 6 HOURS PRN
Status: DISCONTINUED | OUTPATIENT
Start: 2025-06-19 | End: 2025-06-22 | Stop reason: HOSPADM

## 2025-06-19 RX ORDER — GABAPENTIN 300 MG/1
300 CAPSULE ORAL 2 TIMES DAILY
COMMUNITY
Start: 2025-05-30

## 2025-06-19 RX ORDER — DEXAMETHASONE SODIUM PHOSPHATE 4 MG/ML
INJECTION, SOLUTION INTRA-ARTICULAR; INTRALESIONAL; INTRAMUSCULAR; INTRAVENOUS; SOFT TISSUE
Status: DISCONTINUED | OUTPATIENT
Start: 2025-06-19 | End: 2025-06-19

## 2025-06-19 RX ORDER — ONDANSETRON HYDROCHLORIDE 2 MG/ML
4 INJECTION, SOLUTION INTRAVENOUS EVERY 8 HOURS PRN
Status: DISCONTINUED | OUTPATIENT
Start: 2025-06-19 | End: 2025-06-19

## 2025-06-19 RX ORDER — BUDESONIDE 0.5 MG/2ML
0.5 INHALANT ORAL EVERY 12 HOURS
Status: DISCONTINUED | OUTPATIENT
Start: 2025-06-19 | End: 2025-06-22 | Stop reason: HOSPADM

## 2025-06-19 RX ORDER — MEPERIDINE HYDROCHLORIDE 25 MG/ML
25 INJECTION INTRAMUSCULAR; INTRAVENOUS; SUBCUTANEOUS ONCE
Status: DISCONTINUED | OUTPATIENT
Start: 2025-06-19 | End: 2025-06-19

## 2025-06-19 RX ORDER — INSULIN ASPART 100 [IU]/ML
0-10 INJECTION, SOLUTION INTRAVENOUS; SUBCUTANEOUS EVERY 4 HOURS PRN
Status: DISCONTINUED | OUTPATIENT
Start: 2025-06-19 | End: 2025-06-22 | Stop reason: HOSPADM

## 2025-06-19 RX ORDER — ENOXAPARIN SODIUM 100 MG/ML
40 INJECTION SUBCUTANEOUS EVERY 24 HOURS
Status: DISCONTINUED | OUTPATIENT
Start: 2025-06-19 | End: 2025-06-22 | Stop reason: HOSPADM

## 2025-06-19 RX ORDER — ALBUTEROL SULFATE 90 UG/1
2 INHALANT RESPIRATORY (INHALATION) EVERY 6 HOURS PRN
COMMUNITY
Start: 2025-04-29

## 2025-06-19 RX ADMIN — SODIUM CHLORIDE: 9 INJECTION, SOLUTION INTRAVENOUS at 01:06

## 2025-06-19 RX ADMIN — PROPOFOL 150 MG: 10 INJECTION, EMULSION INTRAVENOUS at 12:06

## 2025-06-19 RX ADMIN — GABAPENTIN 400 MG: 400 CAPSULE ORAL at 03:06

## 2025-06-19 RX ADMIN — SODIUM CHLORIDE: 9 INJECTION, SOLUTION INTRAVENOUS at 02:06

## 2025-06-19 RX ADMIN — LIDOCAINE HYDROCHLORIDE 50 MG: 20 INJECTION, SOLUTION EPIDURAL; INFILTRATION; INTRACAUDAL; PERINEURAL at 12:06

## 2025-06-19 RX ADMIN — FENTANYL CITRATE 100 MCG: 50 INJECTION, SOLUTION INTRAMUSCULAR; INTRAVENOUS at 12:06

## 2025-06-19 RX ADMIN — DEXAMETHASONE SODIUM PHOSPHATE 4 MG: 4 INJECTION, SOLUTION INTRA-ARTICULAR; INTRALESIONAL; INTRAMUSCULAR; INTRAVENOUS; SOFT TISSUE at 12:06

## 2025-06-19 RX ADMIN — SODIUM CHLORIDE: 9 INJECTION, SOLUTION INTRAVENOUS at 10:06

## 2025-06-19 RX ADMIN — MIDAZOLAM HYDROCHLORIDE 2 MG: 1 INJECTION, SOLUTION INTRAMUSCULAR; INTRAVENOUS at 12:06

## 2025-06-19 RX ADMIN — MEROPENEM 1 G: 1 INJECTION, POWDER, FOR SOLUTION INTRAVENOUS at 10:06

## 2025-06-19 RX ADMIN — SODIUM CHLORIDE 1750 MG: 9 INJECTION, SOLUTION INTRAVENOUS at 09:06

## 2025-06-19 RX ADMIN — MEROPENEM 1 G: 1 INJECTION, POWDER, FOR SOLUTION INTRAVENOUS at 01:06

## 2025-06-19 RX ADMIN — INSULIN GLARGINE 20 UNITS: 100 INJECTION, SOLUTION SUBCUTANEOUS at 12:06

## 2025-06-19 RX ADMIN — ENOXAPARIN SODIUM 40 MG: 40 INJECTION SUBCUTANEOUS at 04:06

## 2025-06-19 RX ADMIN — BUDESONIDE INHALATION 0.5 MG: 0.5 SUSPENSION RESPIRATORY (INHALATION) at 07:06

## 2025-06-19 RX ADMIN — SODIUM CHLORIDE: 9 INJECTION, SOLUTION INTRAVENOUS at 12:06

## 2025-06-19 RX ADMIN — MEROPENEM 1 G: 1 INJECTION, POWDER, FOR SOLUTION INTRAVENOUS at 04:06

## 2025-06-19 RX ADMIN — GABAPENTIN 400 MG: 400 CAPSULE ORAL at 09:06

## 2025-06-19 RX ADMIN — INSULIN GLARGINE 20 UNITS: 100 INJECTION, SOLUTION SUBCUTANEOUS at 09:06

## 2025-06-19 RX ADMIN — ONDANSETRON 4 MG: 2 INJECTION INTRAMUSCULAR; INTRAVENOUS at 12:06

## 2025-06-19 RX ADMIN — DIPHENHYDRAMINE HYDROCHLORIDE 25 MG: 25 CAPSULE ORAL at 09:06

## 2025-06-19 RX ADMIN — SODIUM CHLORIDE 1750 MG: 9 INJECTION, SOLUTION INTRAVENOUS at 03:06

## 2025-06-19 NOTE — SUBJECTIVE & OBJECTIVE
Past Medical History:   Diagnosis Date    Asthma     Diabetes mellitus     Digestive disorder     Hypertension     Neuropathy     Scoliosis     Seizures     Unspecified osteoarthritis, unspecified site        Past Surgical History:   Procedure Laterality Date     SECTION      DILATION AND CURETTAGE OF UTERUS      HYSTERECTOMY      OPEN REDUCTION AND INTERNAL FIXATION (ORIF) OF FRACTURE OF METATARSAL BONE Right 2024    Procedure: ORIF, FRACTURE, RIGHT 5TH METATARSAL BONE;  Surgeon: Wilton Davidson MD;  Location: [facility];  Service: Orthopedics;  Laterality: Right;       Review of patient's allergies indicates:   Allergen Reactions    Clindamycin Anaphylaxis    Penicillins Swelling    Amoxicillin     Bactrim [sulfamethoxazole-trimethoprim]     Cyclobenzaprine     Morphine     Opioids - morphine analogues     Promethazine     Toradol [ketorolac]     Tramadol        No current facility-administered medications on file prior to encounter.     Current Outpatient Medications on File Prior to Encounter   Medication Sig    albuterol (PROVENTIL) 5 mg/mL nebulizer solution Take 2.5 mg by nebulization every 6 (six) hours as needed for Wheezing. Rescue (Patient not taking: Reported on 2025)    amitriptyline (ELAVIL) 10 MG tablet Take 1 tablet (10 mg total) by mouth every evening. (Patient not taking: Reported on 2025)    atorvastatin (LIPITOR) 10 MG tablet Take 1 tablet (10 mg total) by mouth once daily. (Patient not taking: Reported on 2025)    budesonide-formoterol 160-4.5 mcg (SYMBICORT) 160-4.5 mcg/actuation HFAA Inhale 2 puffs into the lungs every 12 (twelve) hours. Controller (Patient not taking: Reported on 2025)    empagliflozin (JARDIANCE) 25 mg tablet Take 1 tablet (25 mg total) by mouth once daily. (Patient not taking: Reported on 2025)    EScitalopram oxalate (LEXAPRO) 20 MG tablet Take 1 tablet (20 mg total) by mouth once daily. (Patient not taking: Reported on  1/27/2025)    esomeprazole (NEXIUM) 40 mg GrPS Take 40 mg by mouth before breakfast. (Patient not taking: Reported on 1/27/2025)    famotidine (PEPCID) 40 MG tablet Take 1 tablet (40 mg total) by mouth once daily. (Patient not taking: Reported on 1/27/2025)    gabapentin (NEURONTIN) 400 MG capsule Take 1 capsule (400 mg total) by mouth 3 (three) times daily.    HYDROcodone-acetaminophen (NORCO) 5-325 mg per tablet Take 1 tablet by mouth every 6 (six) hours as needed for Pain. (Patient not taking: Reported on 1/27/2025)    insulin glargine U-100, Lantus, (LANTUS SOLOSTAR U-100 INSULIN) 100 unit/mL (3 mL) InPn pen Inject 20 Units into the skin every evening.    lisinopriL (PRINIVIL,ZESTRIL) 5 MG tablet Take 1 tablet (5 mg total) by mouth once daily. (Patient not taking: Reported on 1/27/2025)    menthol (BIOFREEZE, MENTHOL,) 5 % Gel Apply 1 Application topically every 4 (four) hours as needed. (Patient not taking: Reported on 1/27/2025)    montelukast (SINGULAIR) 10 mg tablet Take 1 tablet (10 mg total) by mouth every evening. (Patient not taking: Reported on 1/27/2025)    ondansetron (ZOFRAN) 4 MG tablet Take 1 tablet (4 mg total) by mouth 2 (two) times daily.    oxyCODONE-acetaminophen (PERCOCET)  mg per tablet Take 1 tablet by mouth every 6 (six) hours as needed for Pain. (Patient not taking: Reported on 1/27/2025)    semaglutide (OZEMPIC) 0.25 mg or 0.5 mg (2 mg/3 mL) pen injector Inject 0.5 mg into the skin every 7 days. (Patient not taking: Reported on 1/27/2025)     Family History    None       Tobacco Use    Smoking status: Former     Types: Cigarettes     Start date: 1999     Passive exposure: Past    Smokeless tobacco: Never   Substance and Sexual Activity    Alcohol use: Not Currently    Drug use: Never    Sexual activity: Yes     Partners: Male     Birth control/protection: See Surgical Hx     Review of Systems   Constitutional:  Negative for chills and fever.   HENT:  Negative for postnasal drip and  rhinorrhea.    Eyes:  Negative for itching.   Respiratory:  Negative for shortness of breath.    Cardiovascular:  Negative for chest pain, palpitations and leg swelling.   Gastrointestinal:  Negative for abdominal distention, abdominal pain, diarrhea, nausea and vomiting.   Endocrine: Negative for cold intolerance, heat intolerance, polydipsia, polyphagia and polyuria.   Genitourinary:  Negative for dysuria and hematuria.   Musculoskeletal:  Negative for arthralgias, joint swelling, myalgias, neck pain and neck stiffness.   Skin:         Foul smelling drainage eminating from the R big toe   Allergic/Immunologic: Negative for environmental allergies, food allergies and immunocompromised state.   Neurological:  Negative for dizziness, seizures, facial asymmetry, light-headedness and numbness.     Objective:     Vital Signs (Most Recent):  Temp: 98.8 °F (37.1 °C) (06/18/25 2227)  Pulse: (!) 114 (06/18/25 2227)  Resp: 18 (06/18/25 2227)  BP: 126/77 (06/18/25 2227)  SpO2: 96 % (06/18/25 2227) Vital Signs (24h Range):  Temp:  [98.8 °F (37.1 °C)] 98.8 °F (37.1 °C)  Pulse:  [114] 114  Resp:  [18] 18  SpO2:  [96 %] 96 %  BP: (126)/(77) 126/77     Weight: 89.8 kg (198 lb)  Body mass index is 32.95 kg/m².     Physical Exam  Constitutional:       Appearance: Normal appearance. She is obese.   HENT:      Head: Normocephalic and atraumatic.      Nose: Nose normal.      Mouth/Throat:      Mouth: Mucous membranes are moist.   Eyes:      Extraocular Movements: Extraocular movements intact.   Cardiovascular:      Rate and Rhythm: Normal rate and regular rhythm.      Pulses: Normal pulses.      Heart sounds: Normal heart sounds.   Pulmonary:      Effort: Pulmonary effort is normal.      Breath sounds: Normal breath sounds.   Abdominal:      General: Bowel sounds are normal.      Palpations: Abdomen is soft.   Musculoskeletal:      Cervical back: Normal range of motion and neck supple.   Skin:     General: Skin is warm.      Capillary  Refill: Capillary refill takes less than 2 seconds.      Comments: Foul smelling drainage emanating from the right hallux and erythema involving the entire right hallux were observed   Neurological:      General: No focal deficit present.      Mental Status: She is alert and oriented to person, place, and time.   Psychiatric:         Mood and Affect: Mood normal.         Behavior: Behavior normal.                Significant Labs: All pertinent labs within the past 24 hours have been reviewed.    Significant Imaging: I have reviewed all pertinent imaging results/findings within the past 24 hours.

## 2025-06-19 NOTE — HOSPITAL COURSE
06/19/25 NPO, debridement of right foot/toe per Dr Fitch.  Please obtain consents and place with chart.

## 2025-06-19 NOTE — PHARMACY MED REC
"Admission Medication History     The home medication history was taken by Kaykay Hernandez.    You may go to "Admission" then "Reconcile Home Medications" tabs to review and/or act upon these items.     The home medication list has been updated by the Pharmacy department.   Please read ALL comments highlighted in yellow.   Please address this information as you see fit.    Feel free to contact us if you have any questions or require assistance.    The following medications were added:  Albuterol 90 mcg inh  Cetrizine 10 mg  Gabapentin 300  mg  Novolog flexpen U-100  Tresiba flextouch U-100  Ozempic 8 mg/3 mL      The medications listed below were removed from the home medication list. Please reorder if appropriate:  Symbicort 160-4.5 mcg  Escitalopram 20 mg  Gabapentin 400 mg  Norco 5-325 mg  Lantus U-100  Lisinopril 5 mg  Biofreeze 5%  Ondansetron 4 mg  Percocet  mg  Ozempic 2 mg/3 mL    Medications listed below were obtained from: Analytic software- [email or web address] and Medical records  PTA Medications   Medication Sig    albuterol (PROVENTIL) 5 mg/mL nebulizer solution Take 2.5 mg by nebulization every 6 (six) hours as needed for Wheezing. Rescue    albuterol (PROVENTIL/VENTOLIN HFA) 90 mcg/actuation inhaler Inhale 2 puffs into the lungs every 6 (six) hours as needed.    amitriptyline (ELAVIL) 10 MG tablet Take 1 tablet (10 mg total) by mouth every evening.    atorvastatin (LIPITOR) 10 MG tablet Take 1 tablet (10 mg total) by mouth once daily.    cetirizine (ZYRTEC) 10 MG tablet Take 10 mg by mouth every evening.    empagliflozin (JARDIANCE) 25 mg tablet Take 1 tablet (25 mg total) by mouth once daily.    esomeprazole (NEXIUM) 40 MG capsule Take 40 mg by mouth every morning.    famotidine (PEPCID) 40 MG tablet Take 1 tablet (40 mg total) by mouth once daily.    gabapentin (NEURONTIN) 300 MG capsule Take 300 mg by mouth 2 (two) times daily. and take 2 capsules at bedtime    insulin degludec (TRESIBA FLEXTOUCH U-100) " 100 unit/mL (3 mL) insulin pen Inject 35 Units into the skin every evening. INJECT 35 UNITS UNDER THE SKIN EVERY NIGHT AT BEDTIME    montelukast (SINGULAIR) 10 mg tablet Take 1 tablet (10 mg total) by mouth every evening.    NOVOLOG FLEXPEN U-100 INSULIN 100 unit/mL (3 mL) InPn pen Inject 20 Units into the skin 3 (three) times daily before meals. INJECT INJECT 20 UNITS UNDER THE SKIN 3 TIMES A DAY EACH MORNING, NOON, AND EACH EVENING BEFORE A MEAL ..not to exceed 60 units per day    semaglutide (OZEMPIC) 2 mg/dose (8 mg/3 mL) PnIj Inject 2 mg into the skin every 7 days.         Current Outpatient Medications on File Prior to Encounter   Medication Sig Dispense Refill Last Dose/Taking    albuterol (PROVENTIL) 5 mg/mL nebulizer solution Take 2.5 mg by nebulization every 6 (six) hours as needed for Wheezing. Rescue   Taking As Needed    albuterol (PROVENTIL/VENTOLIN HFA) 90 mcg/actuation inhaler Inhale 2 puffs into the lungs every 6 (six) hours as needed.   Taking As Needed    amitriptyline (ELAVIL) 10 MG tablet Take 1 tablet (10 mg total) by mouth every evening. 90 tablet 0 Taking    atorvastatin (LIPITOR) 10 MG tablet Take 1 tablet (10 mg total) by mouth once daily. 90 tablet 0 Taking    cetirizine (ZYRTEC) 10 MG tablet Take 10 mg by mouth every evening.   Taking    empagliflozin (JARDIANCE) 25 mg tablet Take 1 tablet (25 mg total) by mouth once daily. 90 tablet 0 Taking    esomeprazole (NEXIUM) 40 MG capsule Take 40 mg by mouth every morning.   Taking    famotidine (PEPCID) 40 MG tablet Take 1 tablet (40 mg total) by mouth once daily. 90 tablet 1 Taking    gabapentin (NEURONTIN) 300 MG capsule Take 300 mg by mouth 2 (two) times daily. and take 2 capsules at bedtime   Taking    insulin degludec (TRESIBA FLEXTOUCH U-100) 100 unit/mL (3 mL) insulin pen Inject 35 Units into the skin every evening. INJECT 35 UNITS UNDER THE SKIN EVERY NIGHT AT BEDTIME   Taking    montelukast (SINGULAIR) 10 mg tablet Take 1 tablet (10 mg  total) by mouth every evening. 90 tablet 0 Taking    NOVOLOG FLEXPEN U-100 INSULIN 100 unit/mL (3 mL) InPn pen Inject 20 Units into the skin 3 (three) times daily before meals. INJECT INJECT 20 UNITS UNDER THE SKIN 3 TIMES A DAY EACH MORNING, NOON, AND EACH EVENING BEFORE A MEAL ..not to exceed 60 units per day   Taking    semaglutide (OZEMPIC) 2 mg/dose (8 mg/3 mL) PnIj Inject 2 mg into the skin every 7 days.   Taking    [DISCONTINUED] budesonide-formoterol 160-4.5 mcg (SYMBICORT) 160-4.5 mcg/actuation HFAA Inhale 2 puffs into the lungs every 12 (twelve) hours. Controller (Patient not taking: Reported on 1/27/2025)       [DISCONTINUED] EScitalopram oxalate (LEXAPRO) 20 MG tablet Take 1 tablet (20 mg total) by mouth once daily. (Patient not taking: Reported on 1/27/2025) 90 tablet 0     [DISCONTINUED] esomeprazole (NEXIUM) 40 mg GrPS Take 40 mg by mouth before breakfast. 90 each 1     [DISCONTINUED] gabapentin (NEURONTIN) 400 MG capsule Take 1 capsule (400 mg total) by mouth 3 (three) times daily. 270 capsule 0     [DISCONTINUED] HYDROcodone-acetaminophen (NORCO) 5-325 mg per tablet Take 1 tablet by mouth every 6 (six) hours as needed for Pain. (Patient not taking: Reported on 1/27/2025) 12 tablet 0     [DISCONTINUED] insulin glargine U-100, Lantus, (LANTUS SOLOSTAR U-100 INSULIN) 100 unit/mL (3 mL) InPn pen Inject 20 Units into the skin every evening. 30 mL 0     [DISCONTINUED] lisinopriL (PRINIVIL,ZESTRIL) 5 MG tablet Take 1 tablet (5 mg total) by mouth once daily. (Patient not taking: Reported on 1/27/2025) 90 tablet 1     [DISCONTINUED] menthol (BIOFREEZE, MENTHOL,) 5 % Gel Apply 1 Application topically every 4 (four) hours as needed. (Patient not taking: Reported on 1/27/2025) 118 mL 0     [DISCONTINUED] ondansetron (ZOFRAN) 4 MG tablet Take 1 tablet (4 mg total) by mouth 2 (two) times daily. 20 tablet 0     [DISCONTINUED] oxyCODONE-acetaminophen (PERCOCET)  mg per tablet Take 1 tablet by mouth every 6  (six) hours as needed for Pain. (Patient not taking: Reported on 1/27/2025) 28 tablet 0     [DISCONTINUED] semaglutide (OZEMPIC) 0.25 mg or 0.5 mg (2 mg/3 mL) pen injector Inject 0.5 mg into the skin every 7 days. (Patient not taking: Reported on 1/27/2025) 3 mL 0        Potential issues to be addressed PRIOR TO DISCHARGE  N/A    Kaykay Hernandez  EXT [id number]                 .

## 2025-06-19 NOTE — SUBJECTIVE & OBJECTIVE
No current facility-administered medications on file prior to encounter.     Current Outpatient Medications on File Prior to Encounter   Medication Sig    albuterol (PROVENTIL) 5 mg/mL nebulizer solution Take 2.5 mg by nebulization every 6 (six) hours as needed for Wheezing. Rescue (Patient not taking: Reported on 1/27/2025)    amitriptyline (ELAVIL) 10 MG tablet Take 1 tablet (10 mg total) by mouth every evening. (Patient not taking: Reported on 1/27/2025)    atorvastatin (LIPITOR) 10 MG tablet Take 1 tablet (10 mg total) by mouth once daily. (Patient not taking: Reported on 1/27/2025)    budesonide-formoterol 160-4.5 mcg (SYMBICORT) 160-4.5 mcg/actuation HFAA Inhale 2 puffs into the lungs every 12 (twelve) hours. Controller (Patient not taking: Reported on 1/27/2025)    empagliflozin (JARDIANCE) 25 mg tablet Take 1 tablet (25 mg total) by mouth once daily. (Patient not taking: Reported on 1/27/2025)    EScitalopram oxalate (LEXAPRO) 20 MG tablet Take 1 tablet (20 mg total) by mouth once daily. (Patient not taking: Reported on 1/27/2025)    esomeprazole (NEXIUM) 40 mg GrPS Take 40 mg by mouth before breakfast. (Patient not taking: Reported on 1/27/2025)    famotidine (PEPCID) 40 MG tablet Take 1 tablet (40 mg total) by mouth once daily. (Patient not taking: Reported on 1/27/2025)    gabapentin (NEURONTIN) 400 MG capsule Take 1 capsule (400 mg total) by mouth 3 (three) times daily.    HYDROcodone-acetaminophen (NORCO) 5-325 mg per tablet Take 1 tablet by mouth every 6 (six) hours as needed for Pain. (Patient not taking: Reported on 1/27/2025)    insulin glargine U-100, Lantus, (LANTUS SOLOSTAR U-100 INSULIN) 100 unit/mL (3 mL) InPn pen Inject 20 Units into the skin every evening.    lisinopriL (PRINIVIL,ZESTRIL) 5 MG tablet Take 1 tablet (5 mg total) by mouth once daily. (Patient not taking: Reported on 1/27/2025)    menthol (BIOFREEZE, MENTHOL,) 5 % Gel Apply 1 Application topically every 4 (four)  hours as needed. (Patient not taking: Reported on 2025)    montelukast (SINGULAIR) 10 mg tablet Take 1 tablet (10 mg total) by mouth every evening. (Patient not taking: Reported on 2025)    ondansetron (ZOFRAN) 4 MG tablet Take 1 tablet (4 mg total) by mouth 2 (two) times daily.    oxyCODONE-acetaminophen (PERCOCET)  mg per tablet Take 1 tablet by mouth every 6 (six) hours as needed for Pain. (Patient not taking: Reported on 2025)    semaglutide (OZEMPIC) 0.25 mg or 0.5 mg (2 mg/3 mL) pen injector Inject 0.5 mg into the skin every 7 days. (Patient not taking: Reported on 2025)       Review of patient's allergies indicates:   Allergen Reactions    Clindamycin Anaphylaxis    Penicillins Swelling    Amoxicillin     Bactrim [sulfamethoxazole-trimethoprim]     Cyclobenzaprine     Morphine     Opioids - morphine analogues     Promethazine     Toradol [ketorolac]     Tramadol        Past Medical History:   Diagnosis Date    Asthma     Diabetes mellitus     Digestive disorder     Hypertension     Neuropathy     Scoliosis     Seizures     Unspecified osteoarthritis, unspecified site      Past Surgical History:   Procedure Laterality Date     SECTION      DILATION AND CURETTAGE OF UTERUS      HYSTERECTOMY      OPEN REDUCTION AND INTERNAL FIXATION (ORIF) OF FRACTURE OF METATARSAL BONE Right 2024    Procedure: ORIF, FRACTURE, RIGHT 5TH METATARSAL BONE;  Surgeon: Wilton Davidson MD;  Location[facility];  Service: Orthopedics;  Laterality: Right;     Family History    None       Tobacco Use    Smoking status: Former     Types: Cigarettes     Start date:      Passive exposure: Past    Smokeless tobacco: Never   Substance and Sexual Activity    Alcohol use: Not Currently    Drug use: Never    Sexual activity: Yes     Partners: Male     Birth control/protection: See Surgical Hx     Review of Systems   Constitutional:  Positive for activity change,  fatigue and fever.   Skin:  Positive for wound (right great toe/foot edema).   All other systems reviewed and are negative.    Objective:     Vital Signs (Most Recent):  Temp: 98.3 °F (36.8 °C) (06/19/25 0429)  Pulse: 62 (06/19/25 0429)  Resp: 16 (06/19/25 0429)  BP: 102/69 (06/19/25 0429)  SpO2: 96 % (06/19/25 0429) Vital Signs (24h Range):  Temp:  [98.3 °F (36.8 °C)-98.8 °F (37.1 °C)] 98.3 °F (36.8 °C)  Pulse:  [] 62  Resp:  [16-18] 16  SpO2:  [96 %] 96 %  BP: (102-126)/(69-77) 102/69     Weight: 89.8 kg (198 lb)  Body mass index is 32.95 kg/m².     Physical Exam  HENT:      Head: Normocephalic.      Nose: Nose normal.      Mouth/Throat:      Mouth: Mucous membranes are moist.   Eyes:      Extraocular Movements: Extraocular movements intact.   Cardiovascular:      Rate and Rhythm: Normal rate.   Pulmonary:      Effort: Pulmonary effort is normal.      Breath sounds: Normal breath sounds.   Abdominal:      General: Bowel sounds are normal.      Palpations: Abdomen is soft.   Musculoskeletal:      Cervical back: Normal range of motion.        Feet:    Skin:     General: Skin is warm and dry.      Capillary Refill: Capillary refill takes 2 to 3 seconds.   Neurological:      General: No focal deficit present.      Mental Status: She is alert.   Psychiatric:         Mood and Affect: Mood normal.          I have reviewed all pertinent lab results within the past 24 hours.  CBC:   Recent Labs   Lab 06/18/25 2310   WBC 14.84*   RBC 4.41   HGB 13.0   HCT 39.3   *   MCV 89.1   MCH 29.5   MCHC 33.1     BMP:   Recent Labs   Lab 06/18/25 2310   *   *   K 3.8      CO2 24   BUN 16   CREATININE 0.72   CALCIUM 9.3     Microbiology Results (last 7 days)       Procedure Component Value Units Date/Time    Blood Culture #1 [[id number]] Collected: 06/18/25 2310    Order Status: Sent Specimen: Blood from Peripheral, Forearm, Left Updated: 06/18/25 2327    Blood Culture #2 [[id number]] Collected:  "06/18/25 2479    Order Status: Sent Specimen: Blood from Peripheral, Wrist, Left Updated: 06/18/25 6          Specimen (24h ago, onward)      None          No results for input(s): "COLORU", "CLARITYU", "SPECGRAV", "PHUR", "PROTEINUA", "GLUCOSEU", "BILIRUBINCON", "BLOODU", "WBCU", "RBCU", "BACTERIA", "MUCUS", "NITRITE", "LEUKOCYTESUR", "UROBILINOGEN", "HYALINECASTS" in the last 168 hours.    Significant Diagnostics:  I have reviewed all pertinent imaging results/findings within the past 24 hours.    "

## 2025-06-19 NOTE — ANESTHESIA PROCEDURE NOTES
Intubation    Date/Time: 6/19/2025 12:40 PM    Performed by: Kalyan Ac CRNA  Authorized by: Kalyan Ac CRNA    Intubation:     Induction:  Intravenous    Intubated:  Postinduction    Mask Ventilation:  Easy mask    Attempts:  1    Attempted By:  CRNA    Method of Intubation:  Direct    Difficult Airway Encountered?: No      Complications:  None    Airway Device:  Supraglottic airway/LMA    Airway Device Size:  4.0    Style/Cuff Inflation:  Cuffed (inflated to minimal occlusive pressure)    Placement Verified By:  Capnometry    Complicating Factors:  None    Findings Post-Intubation:  BS equal bilateral and atraumatic/condition of teeth unchanged

## 2025-06-19 NOTE — ED PROVIDER NOTES
Encounter Date: 2025       History     Chief Complaint   Patient presents with    Wound Check     Right foot       44 year old female presents to ED with complaint of right foot infection. Patient states she initially injured her right foot while walking and caught her toe in the air conditioning vent in the floor. She states she noted wound to the area and wound was healing appropriately. She reports re-injuring her foot while in the shower over 1 week ago. She states on  she started noting swelling, pain, and discoloration to her foot. She was able to get an earlier appointment with her PCP in [place], [place] and went on today. PCP recommended ED evaluation for wound infection. Patient also reports her 185lb daughter landed on her leg on Monday and she has been experiencing pain to right lower leg. She reports nausea without vomiting. Denies known fever, chills, chest pain, SOB. PMH of hyperlipidemia, DM, osteoarthritis, GERD, anxiety/depression.     The history is provided by the patient.     Review of patient's allergies indicates:   Allergen Reactions    Clindamycin Anaphylaxis    Penicillins Swelling    Amoxicillin     Bactrim [sulfamethoxazole-trimethoprim]     Cyclobenzaprine     Morphine     Opioids - morphine analogues     Promethazine     Toradol [ketorolac]     Tramadol      Past Medical History:   Diagnosis Date    Asthma     Diabetes mellitus     Digestive disorder     Hypertension     Neuropathy     Scoliosis     Seizures     Unspecified osteoarthritis, unspecified site      Past Surgical History:   Procedure Laterality Date     SECTION      DILATION AND CURETTAGE OF UTERUS      HYSTERECTOMY      OPEN REDUCTION AND INTERNAL FIXATION (ORIF) OF FRACTURE OF METATARSAL BONE Right 2024    Procedure: ORIF, FRACTURE, RIGHT 5TH METATARSAL BONE;  Surgeon: Wilton Davidson MD;  Location: [facility];  Service: Orthopedics;  Laterality: Right;     No family history on file.  Social  History[1]  Review of Systems   Constitutional:  Negative for chills and fever.   Eyes:  Negative for photophobia and visual disturbance.   Respiratory:  Negative for cough and shortness of breath.    Cardiovascular:  Negative for chest pain and palpitations.   Gastrointestinal:  Positive for nausea. Negative for vomiting.   Musculoskeletal:  Positive for arthralgias and joint swelling.   Skin:  Positive for color change and wound.   Neurological:  Negative for dizziness and weakness.   Hematological:  Negative for adenopathy. Does not bruise/bleed easily.   Psychiatric/Behavioral:  Negative for agitation and confusion.    All other systems reviewed and are negative.      Physical Exam     Initial Vitals [06/18/25 2227]   BP Pulse Resp Temp SpO2   126/77 (!) 114 18 98.8 °F (37.1 °C) 96 %      MAP       --         Physical Exam    Nursing note and vitals reviewed.  Constitutional: She appears well-developed and well-nourished.   HENT:   Head: Normocephalic and atraumatic.   Eyes: EOM are normal. Pupils are equal, round, and reactive to light.   Neck: Neck supple.   Normal range of motion.  Cardiovascular:  Normal rate and regular rhythm.           No murmur heard.  Pulmonary/Chest: She has no wheezes. She has no rhonchi.   Abdominal: Abdomen is soft. She exhibits no distension. There is no abdominal tenderness.   Musculoskeletal:         General: Tenderness and edema present.      Cervical back: Normal range of motion and neck supple.     Lymphadenopathy:     She has no cervical adenopathy.   Neurological: She is alert and oriented to person, place, and time. She displays normal reflexes. No cranial nerve deficit or sensory deficit.   Skin: Skin is warm and dry. Capillary refill takes less than 2 seconds.   Psychiatric: She has a normal mood and affect. Thought content normal.                   Medical Screening Exam   See Full Note    ED Course   Procedures  Labs Reviewed   CULTURE, BLOOD   CULTURE, BLOOD   LACTIC  ACID, PLASMA   APTT   PROTIME-INR   CBC W/ AUTO DIFFERENTIAL    Narrative:     The following orders were created for panel order CBC auto differential.  Procedure                               Abnormality         Status                     ---------                               -----------         ------                     CBC with Differential[[id number]]                           In process                   Please view results for these tests on the individual orders.   COMPREHENSIVE METABOLIC PANEL   HIGH SENSITIVITY CRP   CBC WITH DIFFERENTIAL          Imaging Results    None          Medications - No data to display  Medical Decision Making  44 year old female presents to ED with complaint of right foot infection. Patient states she initially injured her right foot while walking and caught her toe in the air conditioning vent in the floor. She states she noted wound to the area and wound was healing appropriately. She reports re-injuring her foot while in the shower over 1 week ago. She states on Sunday she started noting swelling, pain, and discoloration to her foot. She was able to get an earlier appointment with her PCP in [place], [place] and went on today. PCP recommended ED evaluation for wound infection. Patient also reports her 185lb daughter landed on her leg on Monday and she has been experiencing pain to right lower leg. She reports nausea without vomiting. Denies known fever, chills, chest pain, SOB. PMH of hyperlipidemia, DM, osteoarthritis, GERD, anxiety/depression.     Records reviewed  Labs, diagnostics ordered and reviewed      EKG ordered and reviewed  EKG significant for no ST elevation  Rate 103  Rhythm ST  Interpreted by ED physician  Case discussed with Dr. Fitch, Dr. Meza for admission    Amount and/or Complexity of Data Reviewed  Labs: ordered.  Radiology: ordered.                                      Clinical Impression:   Final diagnoses:  [Z01.818] Pre-op evaluation  (Primary)  [T14.90XA] Injury  [L03.031] Cellulitis of toe of right foot                 Eunice Mccoy, [facility]  06/18/25 2340         [1]   Social History  Tobacco Use    Smoking status: Former     Types: Cigarettes     Start date: 1999     Passive exposure: Past    Smokeless tobacco: Never   Substance Use Topics    Alcohol use: Not Currently    Drug use: Never        Eunice Mccoy, [facility]  06/18/25 4124

## 2025-06-19 NOTE — TRANSFER OF CARE
"Anesthesia Transfer of Care Note    Patient: Latha Palm    Procedure(s) Performed: Procedure(s) (LRB):  AMPUTATION, TOE (Right)    Patient location: PACU    Anesthesia Type: general    Transport from OR: Transported from OR on 6-10 L/min O2 by face mask with adequate spontaneous ventilation    Post pain: adequate analgesia    Post assessment: no apparent anesthetic complications    Post vital signs: stable    Level of consciousness: responds to stimulation, awake and sedated    Nausea/Vomiting: no nausea/vomiting    Complications: none    Transfer of care protocol was followed      Last vitals: Visit Vitals  BP (!) 91/54   Pulse 83   Temp 36.6 °C (97.8 °F)   Resp 16   Ht 5' 5" (1.651 m)   Wt 89.8 kg (198 lb)   SpO2 (!) 94%   Breastfeeding No   BMI 32.95 kg/m²     "

## 2025-06-19 NOTE — ASSESSMENT & PLAN NOTE
Patient has no sensation in the bilateral lower extremities due to peripheral neuropathy stemming from complication of diabetes.  Patient also has some chronic neuropathic foot pain as well.  Continue present management with Neurontin and Elavil

## 2025-06-19 NOTE — ASSESSMENT & PLAN NOTE
This patient does have evidence of infective focus  My overall impression is sepsis without organ dysfunction.  Source: Skin and Soft Tissue Infection: Cellulitis involving R hallux  Antibiotics given-   Antibiotics (72h ago, onward)      Vancomycin and Zosyn          Latest lactate reviewed-  Recent Labs   Lab 06/18/25  2310   LACTATE 0.9         Fluid challenge Fluid Not Needed - Patient is not hypotensive and/or lactate is less than 4.0.     Post- resuscitation assessment No - Post resuscitation assessment not needed       Will Not start Pressors- Levophed for MAP of 65  Source control achieved by: Empiric ABX  General surgery will be consulted for wound debridement

## 2025-06-19 NOTE — OP NOTE
[facility] - Periop Services  Surgery Department  Operative Note    SUMMARY     Date of Procedure: 6/19/2025     Procedure: Procedure(s) (LRB):  AMPUTATION, TOE (Right)     Surgeons and Role:     * Montana Fitch DO - Primary    Assisting Surgeon: None    Pre-Operative Diagnosis: Cellulitis of toe of right foot [L03.031]  Cellulitis of right foot [L03.115]    Post-Operative Diagnosis: Post-Op Diagnosis Codes:     * Cellulitis of toe of right foot [L03.031]     * Cellulitis of right foot [L03.115]    Anesthesia: General/MAC    Operative Findings (including complications, if any):  Extensive necrosis of skin and muscle surrounding the right great toe bone, requiring amputation into the metatarsophalangeal joint; purulent drainage cultured and sent to microbiology    Description of Technical Procedures:  Patient was taken the operating room and placed on the operating table in the supine position.  Patient underwent general anesthesia per the anesthesia team.  The right foot was prepped and draped in usual sterile fashion.  Patient had multiple areas of necrosis on the medial right great toe measuring up to 2 cm on the medial side and 1 cm on the lateral side.  We used a sharp excisional debridement with curette and debrided the skin and necrotic muscle all the way down to the bone and through the mid shaft of the 1st phalanx there was extensive muscle necrosis circumferentially around the bone; tissue not viable, thus we decided to amputate.  We used electrocautery and made an elliptical incision around the base of the right great toe and dissected down through the skin, subcutaneous tissue, muscle and down to the metatarsophalangeal joint.  We entered the joint and  the tissue and excise the digit and sent this to pathology.  There was purulent drainage on the toe that we cultured and sent to microbiology.  We irrigated the cavity and suctioned clear, all bleeding controlled electrocautery.  We  fulgurated the distal metatarsal head and then was able to approximate the muscle over this with multiple 3-0 Vicryl sutures.  The skin edges were loosely approximated with 2-0 nylon simple sutures.  The skin was cleaned and dried, sterile dressing applied.  Patient was awakened, extubated and taken the PACU in stable condition.  Patient tolerated procedure well.        Estimated Blood Loss (EBL): 10cc           Implants: * No implants in log *    Specimens:   Specimen (24h ago, onward)       Start     Ordered    06/19/25 1311  Surgical Pathology  RELEASE UPON ORDERING         06/19/25 1311                   ID Type Source Tests Collected by Time Destination   1 : RIGHT GREAT TOE Tissue Toe, Right Foot SURGICAL PATHOLOGY Montana Fitch, DO 6/19/2025 1302               Condition: Good    Disposition: PACU - hemodynamically stable.    Attestation: Op Note Attestation: I was physically present and scrubbed for the entire procedure.

## 2025-06-19 NOTE — HPI
Patient is a 43 y/o female with a h/o poorly controlled type 2 DM on insulin with the last known HbA1c of 15 along with multiple DM complications including retinopathy and peripheral neuropathy who presented to ED after having been evaluated by her PCP for R hallux wound. Pt was found to have gas in the foot of Xray and pt was subsequently asked to come to ED for additional evaluation and intervention. Pt stated that she suffered the R hallux wound after falling in the shower 8 days ago. Since the patient has sensory deficit in the foot, pt did not think much of this until the last few days when she noticed foul smelling drainage from the R foot. This was also accompanied by subjective fever and chills which ultimately culminated in doctor's visit today.    On initial presentation, pt was tachycardic but VS were otherwise stable and afebrile. Work up was notable for presence of leukocytosis with left shift and Xray showing gas in the soft tissues of the distal great toe, generalized soft tissue swelling, and a 5 mm radiopaque foreign body projecting between the fourth and fifth metatarsals possibly plantar aspect.  Pt will be admitted with for further evaluation and intervention

## 2025-06-19 NOTE — H&P (VIEW-ONLY)
[facility] - Emergency Department  General Surgery  Consult Note    Patient Name: Latha Palm  MRN: [id number]  Code Status: Full Code  Admission Date: 6/18/2025  Hospital Length of Stay: 0 days  Attending Physician: Macho Choi MD  Primary Care Provider: No primary care provider on file.    Patient information was obtained from patient, past medical records, ER records, and primary team.     Inpatient consult to General Surgery  Consult performed by: Montana Fitch DO  Consult ordered by: Alex Meza MD  Reason for consult: diabetic foot infection right        Subjective:     Principal Problem: Sepsis    History of Present Illness: Patient presents to ED with complaint of right foot infection. Patient states she initially injured her right foot while walking and caught her toe in the air conditioning vent in the floor. She states she noted wound to the area and wound was healing appropriately. She reports re-injuring her foot while in the shower over 1 week ago. She states on Sunday she started noting swelling, pain, and discoloration to her foot. She was seen by her PCP today and referred to ED evaluation for wound infection. She reports nausea without vomiting. Denies known fever, chills, chest pain, SOB. PMH of hyperlipidemia, DM, osteoarthritis, GERD, anxiety/depression. General surgery was consulted to after xray noted There is gas in the soft tissues of the distal great toe. Generalized soft tissue swelling. There is a 5 mm radiopaque foreign body projecting between the fourth and fifth metatarsals possibly plantar aspect. Patient has been NPO since midnight  Plan for surgery today.  Debridement right foot/ great toe per Dr Fitch under anesthesia.            No current facility-administered medications on file prior to encounter.     Current Outpatient Medications on File Prior to Encounter   Medication Sig    albuterol (PROVENTIL) 5 mg/mL nebulizer solution Take 2.5 mg by  nebulization every 6 (six) hours as needed for Wheezing. Rescue (Patient not taking: Reported on 1/27/2025)    amitriptyline (ELAVIL) 10 MG tablet Take 1 tablet (10 mg total) by mouth every evening. (Patient not taking: Reported on 1/27/2025)    atorvastatin (LIPITOR) 10 MG tablet Take 1 tablet (10 mg total) by mouth once daily. (Patient not taking: Reported on 1/27/2025)    budesonide-formoterol 160-4.5 mcg (SYMBICORT) 160-4.5 mcg/actuation HFAA Inhale 2 puffs into the lungs every 12 (twelve) hours. Controller (Patient not taking: Reported on 1/27/2025)    empagliflozin (JARDIANCE) 25 mg tablet Take 1 tablet (25 mg total) by mouth once daily. (Patient not taking: Reported on 1/27/2025)    EScitalopram oxalate (LEXAPRO) 20 MG tablet Take 1 tablet (20 mg total) by mouth once daily. (Patient not taking: Reported on 1/27/2025)    esomeprazole (NEXIUM) 40 mg GrPS Take 40 mg by mouth before breakfast. (Patient not taking: Reported on 1/27/2025)    famotidine (PEPCID) 40 MG tablet Take 1 tablet (40 mg total) by mouth once daily. (Patient not taking: Reported on 1/27/2025)    gabapentin (NEURONTIN) 400 MG capsule Take 1 capsule (400 mg total) by mouth 3 (three) times daily.    HYDROcodone-acetaminophen (NORCO) 5-325 mg per tablet Take 1 tablet by mouth every 6 (six) hours as needed for Pain. (Patient not taking: Reported on 1/27/2025)    insulin glargine U-100, Lantus, (LANTUS SOLOSTAR U-100 INSULIN) 100 unit/mL (3 mL) InPn pen Inject 20 Units into the skin every evening.    lisinopriL (PRINIVIL,ZESTRIL) 5 MG tablet Take 1 tablet (5 mg total) by mouth once daily. (Patient not taking: Reported on 1/27/2025)    menthol (BIOFREEZE, MENTHOL,) 5 % Gel Apply 1 Application topically every 4 (four) hours as needed. (Patient not taking: Reported on 1/27/2025)    montelukast (SINGULAIR) 10 mg tablet Take 1 tablet (10 mg total) by mouth every evening. (Patient not taking: Reported on 1/27/2025)    ondansetron (ZOFRAN) 4 MG tablet  Take 1 tablet (4 mg total) by mouth 2 (two) times daily.    oxyCODONE-acetaminophen (PERCOCET)  mg per tablet Take 1 tablet by mouth every 6 (six) hours as needed for Pain. (Patient not taking: Reported on 2025)    semaglutide (OZEMPIC) 0.25 mg or 0.5 mg (2 mg/3 mL) pen injector Inject 0.5 mg into the skin every 7 days. (Patient not taking: Reported on 2025)       Review of patient's allergies indicates:   Allergen Reactions    Clindamycin Anaphylaxis    Penicillins Swelling    Amoxicillin     Bactrim [sulfamethoxazole-trimethoprim]     Cyclobenzaprine     Morphine     Opioids - morphine analogues     Promethazine     Toradol [ketorolac]     Tramadol        Past Medical History:   Diagnosis Date    Asthma     Diabetes mellitus     Digestive disorder     Hypertension     Neuropathy     Scoliosis     Seizures     Unspecified osteoarthritis, unspecified site      Past Surgical History:   Procedure Laterality Date     SECTION      DILATION AND CURETTAGE OF UTERUS      HYSTERECTOMY      OPEN REDUCTION AND INTERNAL FIXATION (ORIF) OF FRACTURE OF METATARSAL BONE Right 2024    Procedure: ORIF, FRACTURE, RIGHT 5TH METATARSAL BONE;  Surgeon: Wilton Davidson MD;  Location: [facility];  Service: Orthopedics;  Laterality: Right;     Family History    None       Tobacco Use    Smoking status: Former     Types: Cigarettes     Start date:      Passive exposure: Past    Smokeless tobacco: Never   Substance and Sexual Activity    Alcohol use: Not Currently    Drug use: Never    Sexual activity: Yes     Partners: Male     Birth control/protection: See Surgical Hx     Review of Systems   Constitutional:  Positive for activity change, fatigue and fever.   Skin:  Positive for wound (right great toe/foot edema).   All other systems reviewed and are negative.    Objective:     Vital Signs (Most Recent):  Temp: 98.3 °F (36.8 °C) (25)  Pulse: 62 (25)  Resp: 16 (25  "0429)  BP: 102/69 (06/19/25 0429)  SpO2: 96 % (06/19/25 0429) Vital Signs (24h Range):  Temp:  [98.3 °F (36.8 °C)-98.8 °F (37.1 °C)] 98.3 °F (36.8 °C)  Pulse:  [] 62  Resp:  [16-18] 16  SpO2:  [96 %] 96 %  BP: (102-126)/(69-77) 102/69     Weight: 89.8 kg (198 lb)  Body mass index is 32.95 kg/m².     Physical Exam  HENT:      Head: Normocephalic.      Nose: Nose normal.      Mouth/Throat:      Mouth: Mucous membranes are moist.   Eyes:      Extraocular Movements: Extraocular movements intact.   Cardiovascular:      Rate and Rhythm: Normal rate.   Pulmonary:      Effort: Pulmonary effort is normal.      Breath sounds: Normal breath sounds.   Abdominal:      General: Bowel sounds are normal.      Palpations: Abdomen is soft.   Musculoskeletal:      Cervical back: Normal range of motion.        Feet:    Skin:     General: Skin is warm and dry.      Capillary Refill: Capillary refill takes 2 to 3 seconds.   Neurological:      General: No focal deficit present.      Mental Status: She is alert.   Psychiatric:         Mood and Affect: Mood normal.          I have reviewed all pertinent lab results within the past 24 hours.  CBC:   Recent Labs   Lab 06/18/25 2310   WBC 14.84*   RBC 4.41   HGB 13.0   HCT 39.3   *   MCV 89.1   MCH 29.5   MCHC 33.1     BMP:   Recent Labs   Lab 06/18/25 2310   *   *   K 3.8      CO2 24   BUN 16   CREATININE 0.72   CALCIUM 9.3     Microbiology Results (last 7 days)       Procedure Component Value Units Date/Time    Blood Culture #1 [[id number]] Collected: 06/18/25 2310    Order Status: Sent Specimen: Blood from Peripheral, Forearm, Left Updated: 06/18/25 2327    Blood Culture #2 [[id number]] Collected: 06/18/25 2315    Order Status: Sent Specimen: Blood from Peripheral, Wrist, Left Updated: 06/18/25 2326          Specimen (24h ago, onward)      None          No results for input(s): "COLORU", "CLARITYU", "SPECGRAV", "PHUR", "PROTEINUA", "GLUCOSEU", " ""BILIRUBINCON", "BLOODU", "WBCU", "RBCU", "BACTERIA", "MUCUS", "NITRITE", "LEUKOCYTESUR", "UROBILINOGEN", "HYALINECASTS" in the last 168 hours.    Significant Diagnostics:  I have reviewed all pertinent imaging results/findings within the past 24 hours.     Assessment/Plan:     Diabetic infection of right foot  06/19/25  NPO surgery today.       VTE Risk Mitigation (From admission, onward)           Ordered     enoxaparin injection 40 mg  Daily         06/19/25 0049     IP VTE HIGH RISK PATIENT  Once         06/19/25 0049     Place sequential compression device  Until discontinued         06/19/25 0049                    Thank you for your consult. I will follow-up with patient. Please contact us if you have any additional questions.    MADINA Starr  General Surgery  [facility] - Emergency Department  "

## 2025-06-19 NOTE — INTERVAL H&P NOTE
The patient has been examined and the H&P has been reviewed:    I concur with the findings and no changes have occurred since H&P was written.    Anesthesia/Surgery risks, benefits and alternative options discussed and understood by patient/family.          Active Hospital Problems    Diagnosis  POA    *Sepsis [A41.9]  Yes    Diabetic infection of right foot [E11.628, L08.9]  Yes    Cellulitis of right foot [L03.115]  Unknown    DM type 2 with diabetic peripheral neuropathy [E11.42]  Yes    Reactive airway disease without complication [J45.909]  Yes    Type 2 diabetes mellitus, with long-term current use of insulin [E11.9, Z79.4]  Not Applicable      Resolved Hospital Problems   No resolved problems to display.

## 2025-06-19 NOTE — CONSULTS
[facility] - Emergency Department  General Surgery  Consult Note    Patient Name: Latha Palm  MRN: [id number]  Code Status: Full Code  Admission Date: 6/18/2025  Hospital Length of Stay: 0 days  Attending Physician: Macho Choi MD  Primary Care Provider: No primary care provider on file.    Patient information was obtained from patient, past medical records, ER records, and primary team.     Inpatient consult to General Surgery  Consult performed by: Montana Fitch DO  Consult ordered by: Alex Meza MD  Reason for consult: diabetic foot infection right        Subjective:     Principal Problem: Sepsis    History of Present Illness: Patient presents to ED with complaint of right foot infection. Patient states she initially injured her right foot while walking and caught her toe in the air conditioning vent in the floor. She states she noted wound to the area and wound was healing appropriately. She reports re-injuring her foot while in the shower over 1 week ago. She states on Sunday she started noting swelling, pain, and discoloration to her foot. She was seen by her PCP today and referred to ED evaluation for wound infection. She reports nausea without vomiting. Denies known fever, chills, chest pain, SOB. PMH of hyperlipidemia, DM, osteoarthritis, GERD, anxiety/depression. General surgery was consulted to after xray noted There is gas in the soft tissues of the distal great toe. Generalized soft tissue swelling. There is a 5 mm radiopaque foreign body projecting between the fourth and fifth metatarsals possibly plantar aspect. Patient has been NPO since midnight  Plan for surgery today.  Debridement right foot/ great toe per Dr Fitch under anesthesia.            No current facility-administered medications on file prior to encounter.     Current Outpatient Medications on File Prior to Encounter   Medication Sig    albuterol (PROVENTIL) 5 mg/mL nebulizer solution Take 2.5 mg by  nebulization every 6 (six) hours as needed for Wheezing. Rescue (Patient not taking: Reported on 1/27/2025)    amitriptyline (ELAVIL) 10 MG tablet Take 1 tablet (10 mg total) by mouth every evening. (Patient not taking: Reported on 1/27/2025)    atorvastatin (LIPITOR) 10 MG tablet Take 1 tablet (10 mg total) by mouth once daily. (Patient not taking: Reported on 1/27/2025)    budesonide-formoterol 160-4.5 mcg (SYMBICORT) 160-4.5 mcg/actuation HFAA Inhale 2 puffs into the lungs every 12 (twelve) hours. Controller (Patient not taking: Reported on 1/27/2025)    empagliflozin (JARDIANCE) 25 mg tablet Take 1 tablet (25 mg total) by mouth once daily. (Patient not taking: Reported on 1/27/2025)    EScitalopram oxalate (LEXAPRO) 20 MG tablet Take 1 tablet (20 mg total) by mouth once daily. (Patient not taking: Reported on 1/27/2025)    esomeprazole (NEXIUM) 40 mg GrPS Take 40 mg by mouth before breakfast. (Patient not taking: Reported on 1/27/2025)    famotidine (PEPCID) 40 MG tablet Take 1 tablet (40 mg total) by mouth once daily. (Patient not taking: Reported on 1/27/2025)    gabapentin (NEURONTIN) 400 MG capsule Take 1 capsule (400 mg total) by mouth 3 (three) times daily.    HYDROcodone-acetaminophen (NORCO) 5-325 mg per tablet Take 1 tablet by mouth every 6 (six) hours as needed for Pain. (Patient not taking: Reported on 1/27/2025)    insulin glargine U-100, Lantus, (LANTUS SOLOSTAR U-100 INSULIN) 100 unit/mL (3 mL) InPn pen Inject 20 Units into the skin every evening.    lisinopriL (PRINIVIL,ZESTRIL) 5 MG tablet Take 1 tablet (5 mg total) by mouth once daily. (Patient not taking: Reported on 1/27/2025)    menthol (BIOFREEZE, MENTHOL,) 5 % Gel Apply 1 Application topically every 4 (four) hours as needed. (Patient not taking: Reported on 1/27/2025)    montelukast (SINGULAIR) 10 mg tablet Take 1 tablet (10 mg total) by mouth every evening. (Patient not taking: Reported on 1/27/2025)    ondansetron (ZOFRAN) 4 MG tablet  Take 1 tablet (4 mg total) by mouth 2 (two) times daily.    oxyCODONE-acetaminophen (PERCOCET)  mg per tablet Take 1 tablet by mouth every 6 (six) hours as needed for Pain. (Patient not taking: Reported on 2025)    semaglutide (OZEMPIC) 0.25 mg or 0.5 mg (2 mg/3 mL) pen injector Inject 0.5 mg into the skin every 7 days. (Patient not taking: Reported on 2025)       Review of patient's allergies indicates:   Allergen Reactions    Clindamycin Anaphylaxis    Penicillins Swelling    Amoxicillin     Bactrim [sulfamethoxazole-trimethoprim]     Cyclobenzaprine     Morphine     Opioids - morphine analogues     Promethazine     Toradol [ketorolac]     Tramadol        Past Medical History:   Diagnosis Date    Asthma     Diabetes mellitus     Digestive disorder     Hypertension     Neuropathy     Scoliosis     Seizures     Unspecified osteoarthritis, unspecified site      Past Surgical History:   Procedure Laterality Date     SECTION      DILATION AND CURETTAGE OF UTERUS      HYSTERECTOMY      OPEN REDUCTION AND INTERNAL FIXATION (ORIF) OF FRACTURE OF METATARSAL BONE Right 2024    Procedure: ORIF, FRACTURE, RIGHT 5TH METATARSAL BONE;  Surgeon: Wilton Davidson MD;  Location: [facility];  Service: Orthopedics;  Laterality: Right;     Family History    None       Tobacco Use    Smoking status: Former     Types: Cigarettes     Start date:      Passive exposure: Past    Smokeless tobacco: Never   Substance and Sexual Activity    Alcohol use: Not Currently    Drug use: Never    Sexual activity: Yes     Partners: Male     Birth control/protection: See Surgical Hx     Review of Systems   Constitutional:  Positive for activity change, fatigue and fever.   Skin:  Positive for wound (right great toe/foot edema).   All other systems reviewed and are negative.    Objective:     Vital Signs (Most Recent):  Temp: 98.3 °F (36.8 °C) (25)  Pulse: 62 (25)  Resp: 16 (25  "0429)  BP: 102/69 (06/19/25 0429)  SpO2: 96 % (06/19/25 0429) Vital Signs (24h Range):  Temp:  [98.3 °F (36.8 °C)-98.8 °F (37.1 °C)] 98.3 °F (36.8 °C)  Pulse:  [] 62  Resp:  [16-18] 16  SpO2:  [96 %] 96 %  BP: (102-126)/(69-77) 102/69     Weight: 89.8 kg (198 lb)  Body mass index is 32.95 kg/m².     Physical Exam  HENT:      Head: Normocephalic.      Nose: Nose normal.      Mouth/Throat:      Mouth: Mucous membranes are moist.   Eyes:      Extraocular Movements: Extraocular movements intact.   Cardiovascular:      Rate and Rhythm: Normal rate.   Pulmonary:      Effort: Pulmonary effort is normal.      Breath sounds: Normal breath sounds.   Abdominal:      General: Bowel sounds are normal.      Palpations: Abdomen is soft.   Musculoskeletal:      Cervical back: Normal range of motion.        Feet:    Skin:     General: Skin is warm and dry.      Capillary Refill: Capillary refill takes 2 to 3 seconds.   Neurological:      General: No focal deficit present.      Mental Status: She is alert.   Psychiatric:         Mood and Affect: Mood normal.          I have reviewed all pertinent lab results within the past 24 hours.  CBC:   Recent Labs   Lab 06/18/25 2310   WBC 14.84*   RBC 4.41   HGB 13.0   HCT 39.3   *   MCV 89.1   MCH 29.5   MCHC 33.1     BMP:   Recent Labs   Lab 06/18/25 2310   *   *   K 3.8      CO2 24   BUN 16   CREATININE 0.72   CALCIUM 9.3     Microbiology Results (last 7 days)       Procedure Component Value Units Date/Time    Blood Culture #1 [[id number]] Collected: 06/18/25 2310    Order Status: Sent Specimen: Blood from Peripheral, Forearm, Left Updated: 06/18/25 2327    Blood Culture #2 [[id number]] Collected: 06/18/25 2315    Order Status: Sent Specimen: Blood from Peripheral, Wrist, Left Updated: 06/18/25 2326          Specimen (24h ago, onward)      None          No results for input(s): "COLORU", "CLARITYU", "SPECGRAV", "PHUR", "PROTEINUA", "GLUCOSEU", " ""BILIRUBINCON", "BLOODU", "WBCU", "RBCU", "BACTERIA", "MUCUS", "NITRITE", "LEUKOCYTESUR", "UROBILINOGEN", "HYALINECASTS" in the last 168 hours.    Significant Diagnostics:  I have reviewed all pertinent imaging results/findings within the past 24 hours.     Assessment/Plan:     Diabetic infection of right foot  06/19/25  NPO surgery today.       VTE Risk Mitigation (From admission, onward)           Ordered     enoxaparin injection 40 mg  Daily         06/19/25 0049     IP VTE HIGH RISK PATIENT  Once         06/19/25 0049     Place sequential compression device  Until discontinued         06/19/25 0049                    Thank you for your consult. I will follow-up with patient. Please contact us if you have any additional questions.    MADINA Starr  General Surgery  [facility] - Emergency Department  "

## 2025-06-19 NOTE — ANESTHESIA PREPROCEDURE EVALUATION
2025  Latha Palm is a 44 y.o., female.      Pre-op Assessment    I have reviewed the Patient Summary Reports.     I have reviewed the Nursing Notes. I have reviewed the NPO Status.   I have reviewed the Medications.     Review of Systems  Anesthesia Hx:  No problems with previous Anesthesia             Denies Family Hx of Anesthesia complications.    Denies Personal Hx of Anesthesia complications.                    Social:  No Alcohol Use, Non-Smoker       EENT/Dental:   Diabetic retinopathy          Cardiovascular:  Exercise tolerance: poor   Hypertension           hyperlipidemia   ECG has been reviewed.                            Pulmonary:    Asthma                    Hepatic/GI:     GERD Liver Disease,               Musculoskeletal:  Arthritis               Neurological:    Neuromuscular Disease,  Headaches Seizures          Peripheral Neuropathy                          Endocrine:  Diabetes, poorly controlled, type 2, using insulin         Obesity / BMI > 30  Dermatological:   Cellulitis of toe of right foot [L03.031]       Cellulitis of right foot [L03.115]       Diabetic foot wound   Psych:  Psychiatric History                Past Medical History:   Diagnosis Date    Asthma     Diabetes mellitus     Digestive disorder     Hypertension     Neuropathy     Scoliosis     Seizures     Unspecified osteoarthritis, unspecified site      Past Surgical History:   Procedure Laterality Date     SECTION      DILATION AND CURETTAGE OF UTERUS      HYSTERECTOMY      OPEN REDUCTION AND INTERNAL FIXATION (ORIF) OF FRACTURE OF METATARSAL BONE Right 2024    Procedure: ORIF, FRACTURE, RIGHT 5TH METATARSAL BONE;  Surgeon: Wilton Davidson MD;  Location: [facility];  Service: Orthopedics;  Laterality: Right;         Physical Exam  General: Well nourished, Cooperative and  Alert    Airway:  Mallampati: II   Mouth Opening: Normal  TM Distance: Normal  Tongue: Normal  Neck ROM: Normal ROM    Dental:  Intact    Chest/Lungs:  Clear to auscultation, Normal Respiratory Rate    Heart:  Rate: Normal  Rhythm: Regular Rhythm        Chemistry        Component Value Date/Time     (L) 06/18/2025 2310    K 3.8 06/18/2025 2310     06/18/2025 2310    CO2 24 06/18/2025 2310    BUN 16 06/18/2025 2310    CREATININE 0.72 06/18/2025 2310     (H) 06/18/2025 2310        Component Value Date/Time    CALCIUM 9.3 06/18/2025 2310    ALKPHOS 138 06/18/2025 2310    AST 22 06/18/2025 2310    ALT 21 06/18/2025 2310    BILITOT 0.4 06/18/2025 2310        Lab Results   Component Value Date    WBC 14.84 (H) 06/18/2025    HGB 13.0 06/18/2025    HCT 39.3 06/18/2025     (H) 06/18/2025     Results for orders placed or performed in visit on 09/23/24   EKG 12-lead    Collection Time: 09/23/24  4:14 PM   Result Value Ref Range    QRS Duration 82 ms    [facility] QTC Calculation 424 ms    Narrative    Test Reason : Z01.810,    Vent. Rate : 075 BPM     Atrial Rate : 075 BPM     P-R Int : 202 ms          QRS Dur : 082 ms      QT Int : 380 ms       P-R-T Axes : 053 054 008 degrees     QTc Int : 424 ms    Normal sinus rhythm  Low voltage QRS  Borderline Abnormal ECG  No previous ECGs available  Confirmed by Long AGUILLON, Vikas KNIGHT ([id number]) on 9/24/2024 10:06:29 AM    Referred By: JAVIER MOBLEY           Confirmed By:Vikas Alves MD             Anesthesia Plan  Type of Anesthesia, risks & benefits discussed:    Anesthesia Type: Gen Supraglottic Airway  Intra-op Monitoring Plan: Standard ASA Monitors  Post Op Pain Control Plan: multimodal analgesia  Induction:  IV  Airway Plan: Direct, Post-Induction  Informed Consent: Informed consent signed with the Patient and all parties understand the risks and agree with anesthesia plan.  All questions answered.   ASA Score: 3  Day of Surgery Review of History &  Physical: H&P Update referred to the surgeon/provider.I have interviewed and examined the patient. I have reviewed the patient's H&P dated: There are no significant changes.     Ready For Surgery From Anesthesia Perspective.     .

## 2025-06-19 NOTE — H&P
[facility] - Emergency Department  [facility] Medicine  History & Physical    Patient Name: Latha Palm  MRN: [id number]  Patient Class: Emergency  Admission Date: 6/18/2025  Attending Physician: JEREMIAS Hogan MD  Primary Care Provider: No primary care provider on file.         Patient information was obtained from patient and ER records.     Subjective:     Principal Problem:Sepsis    Chief Complaint:   Chief Complaint   Patient presents with    Wound Check     Right foot          HPI: Patient is a 45 y/o female with a h/o poorly controlled type 2 DM on insulin with the last known HbA1c of 15 along with multiple DM complications including retinopathy and peripheral neuropathy who presented to ED after having been evaluated by her PCP for R hallux wound. Pt was found to have gas in the foot of Xray and pt was subsequently asked to come to ED for additional evaluation and intervention. Pt stated that she suffered the R hallux wound after falling in the shower 8 days ago. Since the patient has sensory deficit in the foot, pt did not think much of this until the last few days when she noticed foul smelling drainage from the R foot. This was also accompanied by subjective fever and chills which ultimately culminated in doctor's visit today.    On initial presentation, pt was tachycardic but VS were otherwise stable and afebrile. Work up was notable for presence of leukocytosis with left shift and Xray showing gas in the soft tissues of the distal great toe, generalized soft tissue swelling, and a 5 mm radiopaque foreign body projecting between the fourth and fifth metatarsals possibly plantar aspect.  Pt will be admitted with for further evaluation and intervention    Past Medical History:   Diagnosis Date    Asthma     Diabetes mellitus     Digestive disorder     Hypertension     Neuropathy     Scoliosis     Seizures     Unspecified osteoarthritis, unspecified site        Past Surgical History:   Procedure  Laterality Date     SECTION      DILATION AND CURETTAGE OF UTERUS      HYSTERECTOMY      OPEN REDUCTION AND INTERNAL FIXATION (ORIF) OF FRACTURE OF METATARSAL BONE Right 2024    Procedure: ORIF, FRACTURE, RIGHT 5TH METATARSAL BONE;  Surgeon: Wilton Davidson MD;  Location: [facility];  Service: Orthopedics;  Laterality: Right;       Review of patient's allergies indicates:   Allergen Reactions    Clindamycin Anaphylaxis    Penicillins Swelling    Amoxicillin     Bactrim [sulfamethoxazole-trimethoprim]     Cyclobenzaprine     Morphine     Opioids - morphine analogues     Promethazine     Toradol [ketorolac]     Tramadol        No current facility-administered medications on file prior to encounter.     Current Outpatient Medications on File Prior to Encounter   Medication Sig    albuterol (PROVENTIL) 5 mg/mL nebulizer solution Take 2.5 mg by nebulization every 6 (six) hours as needed for Wheezing. Rescue (Patient not taking: Reported on 2025)    amitriptyline (ELAVIL) 10 MG tablet Take 1 tablet (10 mg total) by mouth every evening. (Patient not taking: Reported on 2025)    atorvastatin (LIPITOR) 10 MG tablet Take 1 tablet (10 mg total) by mouth once daily. (Patient not taking: Reported on 2025)    budesonide-formoterol 160-4.5 mcg (SYMBICORT) 160-4.5 mcg/actuation HFAA Inhale 2 puffs into the lungs every 12 (twelve) hours. Controller (Patient not taking: Reported on 2025)    empagliflozin (JARDIANCE) 25 mg tablet Take 1 tablet (25 mg total) by mouth once daily. (Patient not taking: Reported on 2025)    EScitalopram oxalate (LEXAPRO) 20 MG tablet Take 1 tablet (20 mg total) by mouth once daily. (Patient not taking: Reported on 2025)    esomeprazole (NEXIUM) 40 mg GrPS Take 40 mg by mouth before breakfast. (Patient not taking: Reported on 2025)    famotidine (PEPCID) 40 MG tablet Take 1 tablet (40 mg total) by mouth once daily. (Patient not taking: Reported on  1/27/2025)    gabapentin (NEURONTIN) 400 MG capsule Take 1 capsule (400 mg total) by mouth 3 (three) times daily.    HYDROcodone-acetaminophen (NORCO) 5-325 mg per tablet Take 1 tablet by mouth every 6 (six) hours as needed for Pain. (Patient not taking: Reported on 1/27/2025)    insulin glargine U-100, Lantus, (LANTUS SOLOSTAR U-100 INSULIN) 100 unit/mL (3 mL) InPn pen Inject 20 Units into the skin every evening.    lisinopriL (PRINIVIL,ZESTRIL) 5 MG tablet Take 1 tablet (5 mg total) by mouth once daily. (Patient not taking: Reported on 1/27/2025)    menthol (BIOFREEZE, MENTHOL,) 5 % Gel Apply 1 Application topically every 4 (four) hours as needed. (Patient not taking: Reported on 1/27/2025)    montelukast (SINGULAIR) 10 mg tablet Take 1 tablet (10 mg total) by mouth every evening. (Patient not taking: Reported on 1/27/2025)    ondansetron (ZOFRAN) 4 MG tablet Take 1 tablet (4 mg total) by mouth 2 (two) times daily.    oxyCODONE-acetaminophen (PERCOCET)  mg per tablet Take 1 tablet by mouth every 6 (six) hours as needed for Pain. (Patient not taking: Reported on 1/27/2025)    semaglutide (OZEMPIC) 0.25 mg or 0.5 mg (2 mg/3 mL) pen injector Inject 0.5 mg into the skin every 7 days. (Patient not taking: Reported on 1/27/2025)     Family History    None       Tobacco Use    Smoking status: Former     Types: Cigarettes     Start date: 1999     Passive exposure: Past    Smokeless tobacco: Never   Substance and Sexual Activity    Alcohol use: Not Currently    Drug use: Never    Sexual activity: Yes     Partners: Male     Birth control/protection: See Surgical Hx     Review of Systems   Constitutional:  Negative for chills and fever.   HENT:  Negative for postnasal drip and rhinorrhea.    Eyes:  Negative for itching.   Respiratory:  Negative for shortness of breath.    Cardiovascular:  Negative for chest pain, palpitations and leg swelling.   Gastrointestinal:  Negative for abdominal distention, abdominal pain,  diarrhea, nausea and vomiting.   Endocrine: Negative for cold intolerance, heat intolerance, polydipsia, polyphagia and polyuria.   Genitourinary:  Negative for dysuria and hematuria.   Musculoskeletal:  Negative for arthralgias, joint swelling, myalgias, neck pain and neck stiffness.   Skin:         Foul smelling drainage eminating from the R big toe   Allergic/Immunologic: Negative for environmental allergies, food allergies and immunocompromised state.   Neurological:  Negative for dizziness, seizures, facial asymmetry, light-headedness and numbness.     Objective:     Vital Signs (Most Recent):  Temp: 98.8 °F (37.1 °C) (06/18/25 2227)  Pulse: (!) 114 (06/18/25 2227)  Resp: 18 (06/18/25 2227)  BP: 126/77 (06/18/25 2227)  SpO2: 96 % (06/18/25 2227) Vital Signs (24h Range):  Temp:  [98.8 °F (37.1 °C)] 98.8 °F (37.1 °C)  Pulse:  [114] 114  Resp:  [18] 18  SpO2:  [96 %] 96 %  BP: (126)/(77) 126/77     Weight: 89.8 kg (198 lb)  Body mass index is 32.95 kg/m².     Physical Exam  Constitutional:       Appearance: Normal appearance. She is obese.   HENT:      Head: Normocephalic and atraumatic.      Nose: Nose normal.      Mouth/Throat:      Mouth: Mucous membranes are moist.   Eyes:      Extraocular Movements: Extraocular movements intact.   Cardiovascular:      Rate and Rhythm: Normal rate and regular rhythm.      Pulses: Normal pulses.      Heart sounds: Normal heart sounds.   Pulmonary:      Effort: Pulmonary effort is normal.      Breath sounds: Normal breath sounds.   Abdominal:      General: Bowel sounds are normal.      Palpations: Abdomen is soft.   Musculoskeletal:      Cervical back: Normal range of motion and neck supple.   Skin:     General: Skin is warm.      Capillary Refill: Capillary refill takes less than 2 seconds.      Comments: Foul smelling drainage emanating from the right hallux and erythema involving the entire right hallux were observed   Neurological:      General: No focal deficit present.       Mental Status: She is alert and oriented to person, place, and time.   Psychiatric:         Mood and Affect: Mood normal.         Behavior: Behavior normal.                Significant Labs: All pertinent labs within the past 24 hours have been reviewed.    Significant Imaging: I have reviewed all pertinent imaging results/findings within the past 24 hours.  Assessment/Plan:     Assessment & Plan  Sepsis  This patient does have evidence of infective focus  My overall impression is sepsis without organ dysfunction.  Source: Skin and Soft Tissue Infection: Cellulitis involving R hallux  Antibiotics given-   Antibiotics (72h ago, onward)      Vancomycin and Zosyn          Latest lactate reviewed-  Recent Labs   Lab 06/18/25  2310   LACTATE 0.9         Fluid challenge Fluid Not Needed - Patient is not hypotensive and/or lactate is less than 4.0.     Post- resuscitation assessment No - Post resuscitation assessment not needed       Will Not start Pressors- Levophed for MAP of 65  Source control achieved by: Empiric ABX  General surgery will be consulted for wound debridement  Type 2 diabetes mellitus, with long-term current use of insulin    Patient has a h/o poorly controlled DM with the last known HbA1c of 15. Patient also has DM complications including retinopathy and peripheral neuropathy. Will continue patient on 20 units of Lantus qhs and add moderate intensity SSI q 4 hours to maintain CBGs in the range of 130's to 180's    DM type 2 with diabetic peripheral neuropathy    Patient has no sensation in the bilateral lower extremities due to peripheral neuropathy stemming from complication of diabetes.  Patient also has some chronic neuropathic foot pain as well.  Continue present management with Neurontin and Elavil     Reactive airway disease without complication    Will start pt on scheduled Duoneb and budesonide neb txs      VTE Risk Mitigation (From admission, onward)      Lovenox 40 mg SQ q24h                             Alex Meza MD  Department of Hospital Medicine  [facility] - Emergency Department

## 2025-06-19 NOTE — ASSESSMENT & PLAN NOTE
Patient has a h/o poorly controlled DM with the last known HbA1c of 15. Patient also has DM complications including retinopathy and peripheral neuropathy. Will continue patient on 20 units of Lantus qhs and add moderate intensity SSI q 4 hours to maintain CBGs in the range of 130's to 180's

## 2025-06-19 NOTE — PROGRESS NOTES
"Pharmacokinetic Initial Assessment: IV Vancomycin    Assessment/Plan:    Initiate intravenous vancomycin 1750mg IV q18h  Desired empiric serum trough concentration is 10 to 15 mcg/mL  Draw vancomycin trough level 30 min prior to fourth dose on 6/21 at approximately 0800  Pharmacy will continue to follow and monitor vancomycin.       Patient brief summary:  Latha Palm is a 44 y.o. female initiated on antimicrobial therapy with IV Vancomycin for treatment of suspected skin & soft tissue infection    Drug Allergies:   Review of patient's allergies indicates:   Allergen Reactions    Clindamycin Anaphylaxis    Penicillins Swelling    Amoxicillin     Bactrim [sulfamethoxazole-trimethoprim]     Cyclobenzaprine     Morphine     Opioids - morphine analogues     Promethazine     Toradol [ketorolac]     Tramadol        Actual Body Weight:   89.8 kg    Renal Function:   Estimated Creatinine Clearance: 111.5 mL/min (based on SCr of 0.72 mg/dL).,     Dialysis Method (if applicable):  N/A    CBC (last 72 hours):  Recent Labs   Lab Result Units 06/18/25  2310   WBC K/uL 14.84*   Hemoglobin g/dL 13.0   Hematocrit % 39.3   Platelet Count K/uL 539*   Lymphocytes % % 21.9*   Lymphocytes, Man % % 25*   Monocytes % % 6.9*   Monocytes, Man % % 5   Eosinophils % % 1.2   Basophils % % 0.3   Diff Type  Manual       Metabolic Panel (last 72 hours):  Recent Labs   Lab Result Units 06/18/25  2310   Sodium mmol/L 135*   Potassium mmol/L 3.8   Chloride mmol/L 102   CO2 mmol/L 24   Glucose mg/dL 117*   BUN mg/dL 16   Creatinine mg/dL 0.72   Albumin g/dL 2.9*   Bilirubin, Total mg/dL 0.4   Alk Phos U/L 138   AST U/L 22   ALT U/L 21       Drug levels (last 3 results):  No results for input(s): "VANCOMYCINRA", "VANCORANDOM", "VANCOMYCINPE", "VANCOPEAK", "VANCOMYCINTR", "VANCOTROUGH" in the last 72 hours.    Microbiologic Results:  Microbiology Results (last 7 days)       Procedure Component Value Units Date/Time    Blood Culture #1 " [[id number]] Collected: 06/18/25 2310    Order Status: Sent Specimen: Blood from Peripheral, Forearm, Left Updated: 06/18/25 2327    Blood Culture #2 [[id number]] Collected: 06/18/25 2315    Order Status: Sent Specimen: Blood from Peripheral, Wrist, Left Updated: 06/18/25 2326            Please contact pharmacy at extension [phone] with any questions regarding this assessment.     Thank you for the consult,   Favian StevensD.

## 2025-06-19 NOTE — ANESTHESIA POSTPROCEDURE EVALUATION
Anesthesia Post Evaluation    Patient: Latha Palm    Procedure(s) Performed: Procedure(s) (LRB):  AMPUTATION, TOE (Right)    Final Anesthesia Type: general      Patient location during evaluation: PACU  Patient participation: Yes- Able to Participate  Level of consciousness: awake and alert and oriented  Post-procedure vital signs: reviewed and stable  Pain management: adequate  Airway patency: patent  PATRICK mitigation strategies: Multimodal analgesia  PONV status at discharge: No PONV  Anesthetic complications: no      Cardiovascular status: hemodynamically stable  Respiratory status: unassisted and spontaneous ventilation  Hydration status: euvolemic  Follow-up not needed.              Vitals Value Taken Time   /73 06/19/25 13:40   Temp 36.6 °C (97.8 °F) 06/19/25 13:30   Pulse 89 06/19/25 13:43   Resp 18 06/19/25 13:36   SpO2 96 % 06/19/25 13:43   Vitals shown include unfiled device data.      No case tracking events are documented in the log.      Pain/Moi Score: No data recorded

## 2025-06-19 NOTE — ED NOTES
Patient taken to Surgery at this time. Care released. Alert and oriented. No distress noted vitals stable

## 2025-06-20 LAB
ALBUMIN SERPL BCP-MCNC: 2.3 G/DL (ref 3.5–5)
ALBUMIN/GLOB SERPL: 0.5 {RATIO}
ALP SERPL-CCNC: 111 U/L (ref 40–150)
ALT SERPL W P-5'-P-CCNC: 14 U/L
ANION GAP SERPL CALCULATED.3IONS-SCNC: 13 MMOL/L (ref 7–16)
AST SERPL W P-5'-P-CCNC: 20 U/L (ref 11–45)
BASOPHILS # BLD AUTO: 0.02 K/UL (ref 0–0.2)
BASOPHILS NFR BLD AUTO: 0.2 % (ref 0–1)
BILIRUB SERPL-MCNC: 0.2 MG/DL
BUN SERPL-MCNC: 13 MG/DL (ref 7–19)
BUN/CREAT SERPL: 20 (ref 6–20)
CALCIUM SERPL-MCNC: 8.3 MG/DL (ref 8.4–10.2)
CHLORIDE SERPL-SCNC: 105 MMOL/L (ref 98–107)
CO2 SERPL-SCNC: 21 MMOL/L (ref 22–29)
CREAT SERPL-MCNC: 0.64 MG/DL (ref 0.55–1.02)
DIFFERENTIAL METHOD BLD: ABNORMAL
EGFR (NO RACE VARIABLE) (RUSH/TITUS): 113 ML/MIN/1.73M2
EOSINOPHIL # BLD AUTO: 0 K/UL (ref 0–0.5)
EOSINOPHIL NFR BLD AUTO: 0 % (ref 1–4)
ERYTHROCYTE [DISTWIDTH] IN BLOOD BY AUTOMATED COUNT: 12.9 % (ref 11.5–14.5)
GLOBULIN SER-MCNC: 4.8 G/DL (ref 2–4)
GLUCOSE SERPL-MCNC: 171 MG/DL (ref 70–105)
GLUCOSE SERPL-MCNC: 187 MG/DL (ref 70–105)
GLUCOSE SERPL-MCNC: 188 MG/DL (ref 70–105)
GLUCOSE SERPL-MCNC: 190 MG/DL (ref 70–105)
GLUCOSE SERPL-MCNC: 196 MG/DL (ref 70–105)
GLUCOSE SERPL-MCNC: 208 MG/DL (ref 74–100)
HCT VFR BLD AUTO: 36.9 % (ref 38–47)
HGB BLD-MCNC: 11.9 G/DL (ref 12–16)
IMM GRANULOCYTES # BLD AUTO: 0.07 K/UL (ref 0–0.04)
IMM GRANULOCYTES NFR BLD: 0.5 % (ref 0–0.4)
LYMPHOCYTES # BLD AUTO: 1.82 K/UL (ref 1–4.8)
LYMPHOCYTES NFR BLD AUTO: 13.9 % (ref 27–41)
MAGNESIUM SERPL-MCNC: 2.3 MG/DL (ref 1.6–2.6)
MCH RBC QN AUTO: 29 PG (ref 27–31)
MCHC RBC AUTO-ENTMCNC: 32.2 G/DL (ref 32–36)
MCV RBC AUTO: 90 FL (ref 80–96)
MONOCYTES # BLD AUTO: 0.59 K/UL (ref 0–0.8)
MONOCYTES NFR BLD AUTO: 4.5 % (ref 2–6)
MPC BLD CALC-MCNC: 9.5 FL (ref 9.4–12.4)
NEUTROPHILS # BLD AUTO: 10.64 K/UL (ref 1.8–7.7)
NEUTROPHILS NFR BLD AUTO: 80.9 % (ref 53–65)
NRBC # BLD AUTO: 0 X10E3/UL
NRBC, AUTO (.00): 0 %
PHOSPHATE SERPL-MCNC: 3 MG/DL (ref 2.3–4.7)
PLATELET # BLD AUTO: 477 K/UL (ref 150–400)
POTASSIUM SERPL-SCNC: 4.2 MMOL/L (ref 3.5–5.1)
PROT SERPL-MCNC: 7.1 G/DL (ref 6.4–8.3)
RBC # BLD AUTO: 4.1 M/UL (ref 4.2–5.4)
SODIUM SERPL-SCNC: 135 MMOL/L (ref 136–145)
WBC # BLD AUTO: 13.14 K/UL (ref 4.5–11)

## 2025-06-20 PROCEDURE — 25000003 PHARM REV CODE 250: Performed by: HOSPITALIST

## 2025-06-20 PROCEDURE — 83735 ASSAY OF MAGNESIUM: CPT | Performed by: STUDENT IN AN ORGANIZED HEALTH CARE EDUCATION/TRAINING PROGRAM

## 2025-06-20 PROCEDURE — 63600175 PHARM REV CODE 636 W HCPCS: Performed by: STUDENT IN AN ORGANIZED HEALTH CARE EDUCATION/TRAINING PROGRAM

## 2025-06-20 PROCEDURE — 94640 AIRWAY INHALATION TREATMENT: CPT

## 2025-06-20 PROCEDURE — 25000242 PHARM REV CODE 250 ALT 637 W/ HCPCS: Performed by: STUDENT IN AN ORGANIZED HEALTH CARE EDUCATION/TRAINING PROGRAM

## 2025-06-20 PROCEDURE — 80053 COMPREHEN METABOLIC PANEL: CPT | Performed by: STUDENT IN AN ORGANIZED HEALTH CARE EDUCATION/TRAINING PROGRAM

## 2025-06-20 PROCEDURE — 36415 COLL VENOUS BLD VENIPUNCTURE: CPT | Performed by: STUDENT IN AN ORGANIZED HEALTH CARE EDUCATION/TRAINING PROGRAM

## 2025-06-20 PROCEDURE — 85025 COMPLETE CBC W/AUTO DIFF WBC: CPT | Performed by: STUDENT IN AN ORGANIZED HEALTH CARE EDUCATION/TRAINING PROGRAM

## 2025-06-20 PROCEDURE — 25000003 PHARM REV CODE 250: Performed by: STUDENT IN AN ORGANIZED HEALTH CARE EDUCATION/TRAINING PROGRAM

## 2025-06-20 PROCEDURE — 11000001 HC ACUTE MED/SURG PRIVATE ROOM

## 2025-06-20 PROCEDURE — 84100 ASSAY OF PHOSPHORUS: CPT | Performed by: STUDENT IN AN ORGANIZED HEALTH CARE EDUCATION/TRAINING PROGRAM

## 2025-06-20 PROCEDURE — 99900035 HC TECH TIME PER 15 MIN (STAT)

## 2025-06-20 PROCEDURE — 82962 GLUCOSE BLOOD TEST: CPT

## 2025-06-20 PROCEDURE — 94761 N-INVAS EAR/PLS OXIMETRY MLT: CPT

## 2025-06-20 RX ORDER — AMITRIPTYLINE HYDROCHLORIDE 10 MG/1
10 TABLET, FILM COATED ORAL NIGHTLY
Status: DISCONTINUED | OUTPATIENT
Start: 2025-06-20 | End: 2025-06-22 | Stop reason: HOSPADM

## 2025-06-20 RX ADMIN — ENOXAPARIN SODIUM 40 MG: 40 INJECTION SUBCUTANEOUS at 05:06

## 2025-06-20 RX ADMIN — GABAPENTIN 400 MG: 400 CAPSULE ORAL at 10:06

## 2025-06-20 RX ADMIN — SODIUM CHLORIDE: 9 INJECTION, SOLUTION INTRAVENOUS at 11:06

## 2025-06-20 RX ADMIN — INSULIN ASPART 2 UNITS: 100 INJECTION, SOLUTION INTRAVENOUS; SUBCUTANEOUS at 12:06

## 2025-06-20 RX ADMIN — INSULIN GLARGINE 20 UNITS: 100 INJECTION, SOLUTION SUBCUTANEOUS at 10:06

## 2025-06-20 RX ADMIN — SODIUM CHLORIDE 1750 MG: 9 INJECTION, SOLUTION INTRAVENOUS at 02:06

## 2025-06-20 RX ADMIN — AMITRIPTYLINE HYDROCHLORIDE 10 MG: 10 TABLET, COATED ORAL at 10:06

## 2025-06-20 RX ADMIN — MEROPENEM 1 G: 1 INJECTION, POWDER, FOR SOLUTION INTRAVENOUS at 12:06

## 2025-06-20 RX ADMIN — SODIUM CHLORIDE: 9 INJECTION, SOLUTION INTRAVENOUS at 12:06

## 2025-06-20 RX ADMIN — BUDESONIDE INHALATION 0.5 MG: 0.5 SUSPENSION RESPIRATORY (INHALATION) at 08:06

## 2025-06-20 RX ADMIN — SODIUM CHLORIDE: 9 INJECTION, SOLUTION INTRAVENOUS at 10:06

## 2025-06-20 RX ADMIN — MEROPENEM 1 G: 1 INJECTION, POWDER, FOR SOLUTION INTRAVENOUS at 10:06

## 2025-06-20 RX ADMIN — GABAPENTIN 400 MG: 400 CAPSULE ORAL at 08:06

## 2025-06-20 RX ADMIN — BUDESONIDE INHALATION 0.5 MG: 0.5 SUSPENSION RESPIRATORY (INHALATION) at 07:06

## 2025-06-20 RX ADMIN — INSULIN ASPART 2 UNITS: 100 INJECTION, SOLUTION INTRAVENOUS; SUBCUTANEOUS at 08:06

## 2025-06-20 RX ADMIN — GABAPENTIN 400 MG: 400 CAPSULE ORAL at 02:06

## 2025-06-20 RX ADMIN — MEROPENEM 1 G: 1 INJECTION, POWDER, FOR SOLUTION INTRAVENOUS at 05:06

## 2025-06-20 NOTE — PLAN OF CARE
[facility] - [address] [place], [place] [address]  Initial Discharge Assessment       Primary Care Provider: No primary care provider on file.    Admission Diagnosis: Injury [T14.90XA]  Cellulitis of right foot [L03.115]  Cellulitis of toe of right foot [L03.031]  Diabetic infection of right foot [E11.628, L08.9]  Pre-op evaluation [Z01.818]  Sepsis, due to unspecified organism, unspecified whether acute organ dysfunction present [A41.9]    Admission Date: 6/18/2025  Expected Discharge Date: 6/23/2025    Transition of Care Barriers: None    Payor: [employer] [place] / Plan: [employer] / Product Type: Commercial /     Extended Emergency Contact Information  Primary Emergency Contact: Shalom Palm  Mobile Phone: [phone]  Relation: Spouse   needed? No    Discharge Plan A: Home with family  Discharge Plan B: Home with family, Home Health      [facility] [place] - [address] [address]  Phone: [phone] Fax: [phone]    [facility] - [place] - [address] [place] [address]  Phone: [phone] Fax: [phone]    [facility] [address] [place] [address]  Phone: [phone] Fax: [phone]    [facility] [address] [place] [address]  Phone: [phone] Fax: [phone]      Initial Assessment (most recent)       Adult Discharge Assessment - 06/20/25 1049          Discharge Assessment    Assessment Type Discharge Planning Assessment     Confirmed/corrected address, phone number and insurance Yes     Confirmed Demographics Correct on Facesheet     Source of Information patient     People in Home spouse;child(darrell), dependent     Name(s) of People in Home Shalom Palm, , [phone]     Do you expect to return to your current living situation? Yes     Do you have help at home or  someone to help you manage your care at home? Yes     Who are your caregiver(s) and their phone number(s)? Shalom Palm, , [phone]     Prior to hospitilization cognitive status: Unable to Assess     Current cognitive status: Alert/Oriented     Walking or Climbing Stairs Difficulty yes     Walking or Climbing Stairs ambulation difficulty, requires equipment     Mobility Management rw     Dressing/Bathing Difficulty no     Home Accessibility stairs to enter home;not wheelchair accessible     Number of Stairs, Main Entrance four     Stair Railings, Main Entrance railings safe and in good condition;railings on both sides of stairs     Home Layout Able to live on 1st floor     Equipment Currently Used at Home walker, rolling     Readmission within 30 days? No     Patient currently being followed by outpatient case management? No     Do you currently have service(s) that help you manage your care at home? No     Do you take prescription medications? Yes     Do you have prescription coverage? Yes     Do you have any problems affording any of your prescribed medications? No     Is the patient taking medications as prescribed? yes     Who is going to help you get home at discharge? Shalom Palm, , [phone]     How do you get to doctors appointments? car, drives self;family or friend will provide     Are you on dialysis? No     Do you take coumadin? No     Discharge Plan A Home with family     Discharge Plan B Home with family;Home Health     DME Needed Upon Discharge  none     Discharge Plan discussed with: Patient     Transition of Care Barriers None        Physical Activity    On average, how many days per week do you engage in moderate to strenuous exercise (like a brisk walk)? 0 days     On average, how many minutes do you engage in exercise at this level? 0 min        Financial Resource Strain    How hard is it for you to pay for the very basics like food, housing, medical care, and heating? Not  hard at all        Housing Stability    In the last 12 months, was there a time when you were not able to pay the mortgage or rent on time? No     At any time in the past 12 months, were you homeless or living in a shelter (including now)? No        Transportation Needs    In the past 12 months, has lack of transportation kept you from medical appointments or from getting medications? No     In the past 12 months, has lack of transportation kept you from meetings, work, or from getting things needed for daily living? No        Food Insecurity    Within the past 12 months, you worried that your food would run out before you got the money to buy more. Never true     Within the past 12 months, the food you bought just didn't last and you didn't have money to get more. Never true        Stress    Do you feel stress - tense, restless, nervous, or anxious, or unable to sleep at night because your mind is troubled all the time - these days? Not at all        Social Isolation    How often do you feel lonely or isolated from those around you?  Never        Alcohol Use    Q1: How often do you have a drink containing alcohol? Never     Q2: How many drinks containing alcohol do you have on a typical day when you are drinking? Patient does not drink     Q3: How often do you have six or more drinks on one occasion? Never        Utilities    In the past 12 months has the electric, gas, oil, or water company threatened to shut off services in your home? No        Health Literacy    How often do you need to have someone help you when you read instructions, pamphlets, or other written material from your doctor or pharmacy? Rarely                   Ss spoke with pt at bedside. Pt lives at home with her  and daughter. Pt plans to return to current living arrangements when medically ready. Pt has required dme. Pt not current with . SDOH complete. Ss following

## 2025-06-20 NOTE — PLAN OF CARE
Problem: Adult Inpatient Plan of Care  Goal: Plan of Care Review  Outcome: Progressing  Goal: Patient-Specific Goal (Individualized)  Outcome: Progressing  Goal: Absence of Hospital-Acquired Illness or Injury  Outcome: Progressing  Goal: Optimal Comfort and Wellbeing  Outcome: Progressing  Goal: Readiness for Transition of Care  Outcome: Progressing     Problem: Skin Injury Risk Increased  Goal: Skin Health and Integrity  Outcome: Progressing     Problem: Sepsis/Septic Shock  Goal: Optimal Coping  Outcome: Progressing  Goal: Absence of Bleeding  Outcome: Progressing  Goal: Blood Glucose Level Within Targeted Range  Outcome: Progressing  Goal: Absence of Infection Signs and Symptoms  Outcome: Progressing  Goal: Optimal Nutrition Intake  Outcome: Progressing     Problem: Diabetes Comorbidity  Goal: Blood Glucose Level Within Targeted Range  Outcome: Progressing     Problem: Wound  Goal: Optimal Coping  Outcome: Progressing  Goal: Optimal Functional Ability  Outcome: Progressing  Goal: Absence of Infection Signs and Symptoms  Outcome: Progressing  Goal: Improved Oral Intake  Outcome: Progressing  Goal: Optimal Pain Control and Function  Outcome: Progressing  Goal: Skin Health and Integrity  Outcome: Progressing  Goal: Optimal Wound Healing  Outcome: Progressing     Problem: Fall Injury Risk  Goal: Absence of Fall and Fall-Related Injury  Outcome: Progressing

## 2025-06-20 NOTE — PROGRESS NOTES
[facility] Hospital Medicine  Progress Note    Patient Name: Latha Palm  MRN: [id number]  Patient Class: IP- Inpatient   Admission Date: 6/18/2025  Length of Stay: 1 days  Attending Physician: Wilfrido Hogan MD  Primary Care Provider: No primary care provider on file.        Subjective     Principal Problem:Sepsis    HPI:  Patient is a 45 y/o female with a h/o poorly controlled type 2 DM on insulin with the last known HbA1c of 15 along with multiple DM complications including retinopathy and peripheral neuropathy who presented to ED after having been evaluated by her PCP for R hallux wound. Pt was found to have gas in the foot of Xray and pt was subsequently asked to come to ED for additional evaluation and intervention. Pt stated that she suffered the R hallux wound after falling in the shower 8 days ago. Since the patient has sensory deficit in the foot, pt did not think much of this until the last few days when she noticed foul smelling drainage from the R foot. This was also accompanied by subjective fever and chills which ultimately culminated in doctor's visit today.    On initial presentation, pt was tachycardic but VS were otherwise stable and afebrile. Work up was notable for presence of leukocytosis with left shift and Xray showing gas in the soft tissues of the distal great toe, generalized soft tissue swelling, and a 5 mm radiopaque foreign body projecting between the fourth and fifth metatarsals possibly plantar aspect.  Pt will be admitted with for further evaluation and intervention    Overview/Hospital Course:  No notes on file    Interval History:     Review of Systems   Constitutional:  Negative for chills and fever.   HENT:  Negative for postnasal drip and rhinorrhea.    Eyes:  Negative for itching.   Respiratory:  Negative for shortness of breath.    Cardiovascular:  Negative for chest pain, palpitations and leg swelling.   Gastrointestinal:  Negative  for abdominal distention, abdominal pain, diarrhea, nausea and vomiting.   Endocrine: Negative for cold intolerance, heat intolerance, polydipsia, polyphagia and polyuria.   Genitourinary:  Negative for dysuria and hematuria.   Musculoskeletal:  Negative for arthralgias, joint swelling, myalgias, neck pain and neck stiffness.   Skin:         Foul smelling drainage eminating from the R big toe   Allergic/Immunologic: Negative for environmental allergies, food allergies and immunocompromised state.   Neurological:  Negative for dizziness, seizures, facial asymmetry, light-headedness and numbness.     Objective:     Vital Signs (Most Recent):  Temp: 97.6 °F (36.4 °C) (06/19/25 2029)  Pulse: 93 (06/19/25 2029)  Resp: 18 (06/19/25 2029)  BP: 108/68 (06/19/25 2029)  SpO2: (!) 94 % (06/19/25 2029) Vital Signs (24h Range):  Temp:  [97.6 °F (36.4 °C)-98.4 °F (36.9 °C)] 97.6 °F (36.4 °C)  Pulse:  [] 93  Resp:  [9-18] 18  SpO2:  [92 %-98 %] 94 %  BP: ()/(54-86) 108/68     Weight: 89.8 kg (198 lb)  Body mass index is 32.95 kg/m².    Intake/Output Summary (Last 24 hours) at 6/19/2025 2359  Last data filed at 6/19/2025 1838  Gross per 24 hour   Intake 1836.36 ml   Output --   Net 1836.36 ml         Physical Exam  Constitutional:       Appearance: Normal appearance. She is obese.   HENT:      Head: Normocephalic and atraumatic.      Nose: Nose normal.      Mouth/Throat:      Mouth: Mucous membranes are moist.   Eyes:      Extraocular Movements: Extraocular movements intact.   Cardiovascular:      Rate and Rhythm: Normal rate and regular rhythm.      Pulses: Normal pulses.      Heart sounds: Normal heart sounds.   Pulmonary:      Effort: Pulmonary effort is normal.      Breath sounds: Normal breath sounds.   Abdominal:      General: Bowel sounds are normal.      Palpations: Abdomen is soft.   Musculoskeletal:      Cervical back: Normal range of motion and neck supple.   Skin:     General: Skin is warm.      Capillary  Refill: Capillary refill takes less than 2 seconds.      Comments: Foul smelling drainage emanating from the right hallux and erythema involving the entire right hallux were observed   Neurological:      General: No focal deficit present.      Mental Status: She is alert and oriented to person, place, and time.   Psychiatric:         Mood and Affect: Mood normal.         Behavior: Behavior normal.               Significant Labs: All pertinent labs within the past 24 hours have been reviewed.    Significant Imaging: I have reviewed all pertinent imaging results/findings within the past 24 hours.      Assessment & Plan  Sepsis  This patient does have evidence of infective focus  My overall impression is sepsis without organ dysfunction.  Source: Skin and Soft Tissue Infection: Cellulitis involving R hallux  Antibiotics given-   Antibiotics (72h ago, onward)      Vancomycin and Zosyn          Latest lactate reviewed-  Recent Labs   Lab 06/18/25  2310   LACTATE 0.9         Fluid challenge Fluid Not Needed - Patient is not hypotensive and/or lactate is less than 4.0.     Post- resuscitation assessment No - Post resuscitation assessment not needed       Will Not start Pressors- Levophed for MAP of 65  Source control achieved by: Empiric ABX  General surgery will be consulted for wound debridement  Type 2 diabetes mellitus, with long-term current use of insulin    Patient has a h/o poorly controlled DM with the last known HbA1c of 15. Patient also has DM complications including retinopathy and peripheral neuropathy. Will continue patient on 20 units of Lantus qhs and add moderate intensity SSI q 4 hours to maintain CBGs in the range of 130's to 180's    DM type 2 with diabetic peripheral neuropathy    Patient has no sensation in the bilateral lower extremities due to peripheral neuropathy stemming from complication of diabetes.  Patient also has some chronic neuropathic foot pain as well.  Continue present management with  Neurontin and Elavil     Reactive airway disease without complication    Will start pt on scheduled Duoneb and budesonide neb txs    Diabetic infection of right foot  R great toe infection.   Plan to go to OR for debridement.   Cellulitis of right foot      VTE Risk Mitigation (From admission, onward)           Ordered     enoxaparin injection 40 mg  Daily         06/19/25 0049     IP VTE HIGH RISK PATIENT  Once         06/19/25 0049     Place sequential compression device  Until discontinued         06/19/25 0049                    Discharge Planning   SHANIA:      Code Status: Full Code   Patient is Medically Ready Now for discharge.  Medical Readiness for Discharge Date:              [facility] Screening:  The patient declined to be screened for utility difficulties, food insecurity, transport difficulties, housing insecurity, and interpersonal safety, so no concerns could be identified this admission.          Macho Choi MD  Department of Hospital Medicine   [facility]

## 2025-06-20 NOTE — PROGRESS NOTES
[facility]  Wound Care    Patient Name:  Latha Palm   MRN:  [id number]  Date: 6/20/2025  Diagnosis: Sepsis    History:     Past Medical History:   Diagnosis Date    Asthma     Diabetes mellitus     Digestive disorder     Hypertension     Neuropathy     Scoliosis     Seizures     Unspecified osteoarthritis, unspecified site        Social History[1]    Precautions:     Allergies as of 06/18/2025 - Reviewed 06/18/2025   Allergen Reaction Noted    Clindamycin Anaphylaxis 08/16/2019    Penicillins Swelling 01/12/2016    Amoxicillin  09/14/2023    Bactrim [sulfamethoxazole-trimethoprim]  09/14/2023    Cyclobenzaprine  01/12/2016    Morphine  01/12/2016    Opioids - morphine analogues  09/14/2023    Promethazine  01/12/2016    Toradol [ketorolac]  09/14/2023    Tramadol  01/12/2016       [facility] Assessment Details/Treatment     Narrative: Seen patient for initiation of preventative skin care measures    Patient in bed, Alert. Has ace wrap dressing to Right foot.  Dr Fitch following wound care. Patient crying on the phone with someone. Reported to her nurse Neda WETZEL See nursing notes for skin assessment.   Riccardo score 17    Consult wound care for any skin issues             06/20/2025         [1]   Social History  Socioeconomic History    Marital status:     Number of children: 4   Tobacco Use    Smoking status: Former     Types: Cigarettes     Start date: 1999     Passive exposure: Past    Smokeless tobacco: Never   Substance and Sexual Activity    Alcohol use: Not Currently    Drug use: Never    Sexual activity: Yes     Partners: Male     Birth control/protection: See Surgical Hx   Social History Narrative    Lives at home with  and 1 child     Social Drivers of Health     Financial Resource Strain: Low Risk  (6/20/2025)    Overall Financial Resource Strain (CARDIA)     Difficulty of Paying Living Expenses: Not hard at all   Food Insecurity: No Food Insecurity  (6/20/2025)    Hunger Vital Sign     Worried About Running Out of Food in the Last Year: Never true     Ran Out of Food in the Last Year: Never true   Transportation Needs: No Transportation Needs (6/20/2025)    PRAPARE - Transportation     Lack of Transportation (Medical): No     Lack of Transportation (Non-Medical): No   Physical Activity: Inactive (6/20/2025)    Exercise Vital Sign     Days of Exercise per Week: 0 days     Minutes of Exercise per Session: 0 min   Stress: No Stress Concern Present (6/20/2025)    [employer] - Occupational Stress Questionnaire     Feeling of Stress : Not at all   Housing Stability: Low Risk  (6/20/2025)    Housing Stability Vital Sign     Unable to Pay for Housing in the Last Year: No     Homeless in the Last Year: No

## 2025-06-20 NOTE — PROGRESS NOTES
Visited with patient and provided education secondary to A1c on a consistent carbohydrate diet, reading nutrition labels, My Plate for diabetes, and heart-healthy cooking tips. Patient receptive to information. All questions answered. RD available as needed.

## 2025-06-20 NOTE — SUBJECTIVE & OBJECTIVE
Interval History:     Review of Systems   Constitutional:  Negative for chills and fever.   HENT:  Negative for postnasal drip and rhinorrhea.    Eyes:  Negative for itching.   Respiratory:  Negative for shortness of breath.    Cardiovascular:  Negative for chest pain, palpitations and leg swelling.   Gastrointestinal:  Negative for abdominal distention, abdominal pain, diarrhea, nausea and vomiting.   Endocrine: Negative for cold intolerance, heat intolerance, polydipsia, polyphagia and polyuria.   Genitourinary:  Negative for dysuria and hematuria.   Musculoskeletal:  Negative for arthralgias, joint swelling, myalgias, neck pain and neck stiffness.   Skin:         Foul smelling drainage eminating from the R big toe   Allergic/Immunologic: Negative for environmental allergies, food allergies and immunocompromised state.   Neurological:  Negative for dizziness, seizures, facial asymmetry, light-headedness and numbness.     Objective:     Vital Signs (Most Recent):  Temp: 97.6 °F (36.4 °C) (06/19/25 2029)  Pulse: 93 (06/19/25 2029)  Resp: 18 (06/19/25 2029)  BP: 108/68 (06/19/25 2029)  SpO2: (!) 94 % (06/19/25 2029) Vital Signs (24h Range):  Temp:  [97.6 °F (36.4 °C)-98.4 °F (36.9 °C)] 97.6 °F (36.4 °C)  Pulse:  [] 93  Resp:  [9-18] 18  SpO2:  [92 %-98 %] 94 %  BP: ()/(54-86) 108/68     Weight: 89.8 kg (198 lb)  Body mass index is 32.95 kg/m².    Intake/Output Summary (Last 24 hours) at 6/19/2025 2359  Last data filed at 6/19/2025 1838  Gross per 24 hour   Intake 1836.36 ml   Output --   Net 1836.36 ml         Physical Exam  Constitutional:       Appearance: Normal appearance. She is obese.   HENT:      Head: Normocephalic and atraumatic.      Nose: Nose normal.      Mouth/Throat:      Mouth: Mucous membranes are moist.   Eyes:      Extraocular Movements: Extraocular movements intact.   Cardiovascular:      Rate and Rhythm: Normal rate and regular rhythm.      Pulses: Normal pulses.      Heart sounds:  Normal heart sounds.   Pulmonary:      Effort: Pulmonary effort is normal.      Breath sounds: Normal breath sounds.   Abdominal:      General: Bowel sounds are normal.      Palpations: Abdomen is soft.   Musculoskeletal:      Cervical back: Normal range of motion and neck supple.   Skin:     General: Skin is warm.      Capillary Refill: Capillary refill takes less than 2 seconds.      Comments: Foul smelling drainage emanating from the right hallux and erythema involving the entire right hallux were observed   Neurological:      General: No focal deficit present.      Mental Status: She is alert and oriented to person, place, and time.   Psychiatric:         Mood and Affect: Mood normal.         Behavior: Behavior normal.               Significant Labs: All pertinent labs within the past 24 hours have been reviewed.    Significant Imaging: I have reviewed all pertinent imaging results/findings within the past 24 hours.

## 2025-06-20 NOTE — DISCHARGE INSTRUCTIONS
*FOOD ASSISTANCE ORGANIZATIONS/PROGRAMS*   Food - [place] Department of  Human Services - visit this link for more information and guidance  ([facility].[place].gov/services/food/)   SNAP: The Supplemental Nutrition Assistance Program provides monthly benefits that  help low-income households buy the foods they need.  o Shop at [employer] (with [employer] [place]): Some farmers markets double  SNAP benefits for fresh produce!   TEFAP: The Emergency Food Assistance Program supplements diets of those struggling  financially by providing them with emergency food assistance at no cost. In [place],  [place] Department of Human Services ([facility]) partners with organizations to  distribute this food.   Local Food Pantries/Community Help: Such as [employer], Feed by [employer]   Calling 211: 211 is a nationwide helpline that connects people with food assistance  programs, food gracia, and other community services. You can call 211, text your zip  code to 704209, or visit TapFame.org    *OLDER ADULT FOOD ASSISTANCE*   Meals for Older Adults: Older [facility] may qualify for home delivered meals or  be able to find a local senior center in their area that offers meals. For the most current  list of meal sites enar you, please contact your local Area Agency on Aging (AAA) using  the search box at this link  o Help Living in the Community for Older Adults - [facility]  of Human Services ([facility].[place].gov/aging/services/)   CSFP: The Commodity Supplemental Food Program (CSFP) provides a monthly food  package tailored for older adults aged 60 or above that can help stretch their food  dollars and add nutritious foods to their diet for good health.    *TIPS FOR SHOPPING ON A BUDGET*   Create a shopping list - stick to it to avoid impulse buys   Check what you have - use whats already in your pantry or fridge to cut costs   Set a budget - helps avoid overspending   Meal plan - choose recipes that use similar  or the same ingredients to cut down your  grocery list   Use coupons & store apps - look for sales, discounts, and loyalty points   Buy generic or store brands - often the same quality, but much cheaper than name  brands   Compare unit prices - check the price per ounce/pound to find the best value   Buy in-season - fruits and vegetables that are in-season tend to be cheaper!   Buy whole foods - whole carrots, potatoes, rice, and oats are cheaper than pre-cut or  instant versions   Go for canned & frozen - canned beans, tomatoes, and frozen vegetables are  nutritious, budget-friendly, and last longer   Buy in bulk (when possible) - rice, beans, and pasta are cheaper in larger quantities  and dont spoil quickly   Eat more plant-based meals - beans and lentils are affordable and nutritious protein  sources, especially when bought in bulk!   Use leftovers creatively - turn last nights chicken into a soup or wrap   Cook in batches - make big portions and freeze leftovers

## 2025-06-21 PROBLEM — R78.81 BACTEREMIA: Status: ACTIVE | Noted: 2025-06-21

## 2025-06-21 LAB
ALBUMIN SERPL BCP-MCNC: 2.2 G/DL (ref 3.5–5)
ALBUMIN/GLOB SERPL: 0.5 {RATIO}
ALP SERPL-CCNC: 96 U/L (ref 40–150)
ALT SERPL W P-5'-P-CCNC: 10 U/L
ANION GAP SERPL CALCULATED.3IONS-SCNC: 12 MMOL/L (ref 7–16)
AST SERPL W P-5'-P-CCNC: 16 U/L (ref 11–45)
ATYPICAL LYMPHOCYTES: ABNORMAL
BASOPHILS # BLD AUTO: 0.04 K/UL (ref 0–0.2)
BASOPHILS NFR BLD AUTO: 0.4 % (ref 0–1)
BILIRUB SERPL-MCNC: 0.1 MG/DL
BUN SERPL-MCNC: 13 MG/DL (ref 7–19)
BUN/CREAT SERPL: 23 (ref 6–20)
CALCIUM SERPL-MCNC: 8.1 MG/DL (ref 8.4–10.2)
CHLORIDE SERPL-SCNC: 109 MMOL/L (ref 98–107)
CO2 SERPL-SCNC: 23 MMOL/L (ref 22–29)
CREAT SERPL-MCNC: 0.57 MG/DL (ref 0.55–1.02)
DIFFERENTIAL METHOD BLD: ABNORMAL
EGFR (NO RACE VARIABLE) (RUSH/TITUS): 116 ML/MIN/1.73M2
EOSINOPHIL # BLD AUTO: 0.13 K/UL (ref 0–0.5)
EOSINOPHIL NFR BLD AUTO: 1.4 % (ref 1–4)
EOSINOPHIL NFR BLD MANUAL: 3 % (ref 1–4)
ERYTHROCYTE [DISTWIDTH] IN BLOOD BY AUTOMATED COUNT: 13.2 % (ref 11.5–14.5)
GLOBULIN SER-MCNC: 4.2 G/DL (ref 2–4)
GLUCOSE SERPL-MCNC: 131 MG/DL (ref 70–105)
GLUCOSE SERPL-MCNC: 136 MG/DL (ref 74–100)
GLUCOSE SERPL-MCNC: 143 MG/DL (ref 70–105)
GLUCOSE SERPL-MCNC: 152 MG/DL (ref 70–105)
HCT VFR BLD AUTO: 36.9 % (ref 38–47)
HGB BLD-MCNC: 11.5 G/DL (ref 12–16)
IMM GRANULOCYTES # BLD AUTO: 0.05 K/UL (ref 0–0.04)
IMM GRANULOCYTES NFR BLD: 0.5 % (ref 0–0.4)
LYMPHOCYTES # BLD AUTO: 4.07 K/UL (ref 1–4.8)
LYMPHOCYTES NFR BLD AUTO: 44.5 % (ref 27–41)
LYMPHOCYTES NFR BLD MANUAL: 50 % (ref 27–41)
MAGNESIUM SERPL-MCNC: 2.4 MG/DL (ref 1.6–2.6)
MCH RBC QN AUTO: 29.2 PG (ref 27–31)
MCHC RBC AUTO-ENTMCNC: 31.2 G/DL (ref 32–36)
MCV RBC AUTO: 93.7 FL (ref 80–96)
MICROORGANISM SPEC CULT: ABNORMAL
MONOCYTES # BLD AUTO: 0.69 K/UL (ref 0–0.8)
MONOCYTES NFR BLD AUTO: 7.5 % (ref 2–6)
MONOCYTES NFR BLD MANUAL: 9 % (ref 2–6)
MPC BLD CALC-MCNC: 8.9 FL (ref 9.4–12.4)
NEUTROPHILS # BLD AUTO: 4.16 K/UL (ref 1.8–7.7)
NEUTROPHILS NFR BLD AUTO: 45.7 % (ref 53–65)
NEUTS BAND NFR BLD MANUAL: 3 % (ref 1–5)
NEUTS SEG NFR BLD MANUAL: 35 % (ref 50–62)
NRBC # BLD AUTO: 0 X10E3/UL
NRBC, AUTO (.00): 0 %
PHOSPHATE SERPL-MCNC: 2.3 MG/DL (ref 2.3–4.7)
PLATELET # BLD AUTO: 530 K/UL (ref 150–400)
PLATELET MORPHOLOGY: ABNORMAL
POTASSIUM SERPL-SCNC: 4 MMOL/L (ref 3.5–5.1)
PROT SERPL-MCNC: 6.4 G/DL (ref 6.4–8.3)
RBC # BLD AUTO: 3.94 M/UL (ref 4.2–5.4)
RBC MORPH BLD: NORMAL
SODIUM SERPL-SCNC: 140 MMOL/L (ref 136–145)
VANCOMYCIN TROUGH SERPL-MCNC: 6.7 ΜG/ML (ref 15–20)
WBC # BLD AUTO: 9.14 K/UL (ref 4.5–11)

## 2025-06-21 PROCEDURE — 25000242 PHARM REV CODE 250 ALT 637 W/ HCPCS: Performed by: STUDENT IN AN ORGANIZED HEALTH CARE EDUCATION/TRAINING PROGRAM

## 2025-06-21 PROCEDURE — 11000001 HC ACUTE MED/SURG PRIVATE ROOM

## 2025-06-21 PROCEDURE — 63600175 PHARM REV CODE 636 W HCPCS: Performed by: STUDENT IN AN ORGANIZED HEALTH CARE EDUCATION/TRAINING PROGRAM

## 2025-06-21 PROCEDURE — 99233 SBSQ HOSP IP/OBS HIGH 50: CPT | Mod: ,,, | Performed by: HOSPITALIST

## 2025-06-21 PROCEDURE — 83735 ASSAY OF MAGNESIUM: CPT | Performed by: STUDENT IN AN ORGANIZED HEALTH CARE EDUCATION/TRAINING PROGRAM

## 2025-06-21 PROCEDURE — 94640 AIRWAY INHALATION TREATMENT: CPT

## 2025-06-21 PROCEDURE — 80202 ASSAY OF VANCOMYCIN: CPT | Performed by: STUDENT IN AN ORGANIZED HEALTH CARE EDUCATION/TRAINING PROGRAM

## 2025-06-21 PROCEDURE — 25000003 PHARM REV CODE 250: Performed by: HOSPITALIST

## 2025-06-21 PROCEDURE — 80053 COMPREHEN METABOLIC PANEL: CPT | Performed by: STUDENT IN AN ORGANIZED HEALTH CARE EDUCATION/TRAINING PROGRAM

## 2025-06-21 PROCEDURE — 25000003 PHARM REV CODE 250: Performed by: STUDENT IN AN ORGANIZED HEALTH CARE EDUCATION/TRAINING PROGRAM

## 2025-06-21 PROCEDURE — 82962 GLUCOSE BLOOD TEST: CPT

## 2025-06-21 PROCEDURE — 94761 N-INVAS EAR/PLS OXIMETRY MLT: CPT

## 2025-06-21 PROCEDURE — 36415 COLL VENOUS BLD VENIPUNCTURE: CPT | Performed by: STUDENT IN AN ORGANIZED HEALTH CARE EDUCATION/TRAINING PROGRAM

## 2025-06-21 PROCEDURE — 85025 COMPLETE CBC W/AUTO DIFF WBC: CPT | Performed by: STUDENT IN AN ORGANIZED HEALTH CARE EDUCATION/TRAINING PROGRAM

## 2025-06-21 PROCEDURE — 84100 ASSAY OF PHOSPHORUS: CPT | Performed by: STUDENT IN AN ORGANIZED HEALTH CARE EDUCATION/TRAINING PROGRAM

## 2025-06-21 PROCEDURE — 63600175 PHARM REV CODE 636 W HCPCS: Performed by: HOSPITALIST

## 2025-06-21 RX ORDER — ALUMINUM HYDROXIDE, MAGNESIUM HYDROXIDE, AND SIMETHICONE 2400; 240; 2400 MG/30ML; MG/30ML; MG/30ML
30 SUSPENSION ORAL EVERY 6 HOURS PRN
Status: DISCONTINUED | OUTPATIENT
Start: 2025-06-21 | End: 2025-06-22 | Stop reason: HOSPADM

## 2025-06-21 RX ORDER — ACETAMINOPHEN 325 MG/1
650 TABLET ORAL EVERY 6 HOURS PRN
Status: DISCONTINUED | OUTPATIENT
Start: 2025-06-21 | End: 2025-06-22 | Stop reason: HOSPADM

## 2025-06-21 RX ADMIN — IPRATROPIUM BROMIDE AND ALBUTEROL SULFATE 3 ML: .5; 3 SOLUTION RESPIRATORY (INHALATION) at 07:06

## 2025-06-21 RX ADMIN — GABAPENTIN 400 MG: 400 CAPSULE ORAL at 03:06

## 2025-06-21 RX ADMIN — SODIUM CHLORIDE 1750 MG: 9 INJECTION, SOLUTION INTRAVENOUS at 08:06

## 2025-06-21 RX ADMIN — GABAPENTIN 400 MG: 400 CAPSULE ORAL at 08:06

## 2025-06-21 RX ADMIN — MEROPENEM 1 G: 1 INJECTION, POWDER, FOR SOLUTION INTRAVENOUS at 01:06

## 2025-06-21 RX ADMIN — AMITRIPTYLINE HYDROCHLORIDE 10 MG: 10 TABLET, COATED ORAL at 08:06

## 2025-06-21 RX ADMIN — SODIUM CHLORIDE: 9 INJECTION, SOLUTION INTRAVENOUS at 08:06

## 2025-06-21 RX ADMIN — MEROPENEM 1 G: 1 INJECTION, POWDER, FOR SOLUTION INTRAVENOUS at 08:06

## 2025-06-21 RX ADMIN — BUDESONIDE INHALATION 0.5 MG: 0.5 SUSPENSION RESPIRATORY (INHALATION) at 08:06

## 2025-06-21 RX ADMIN — INSULIN GLARGINE 20 UNITS: 100 INJECTION, SOLUTION SUBCUTANEOUS at 08:06

## 2025-06-21 RX ADMIN — SODIUM CHLORIDE: 9 INJECTION, SOLUTION INTRAVENOUS at 02:06

## 2025-06-21 RX ADMIN — BUDESONIDE INHALATION 0.5 MG: 0.5 SUSPENSION RESPIRATORY (INHALATION) at 07:06

## 2025-06-21 RX ADMIN — ACETAMINOPHEN 650 MG: 325 TABLET ORAL at 12:06

## 2025-06-21 RX ADMIN — ENOXAPARIN SODIUM 40 MG: 40 INJECTION SUBCUTANEOUS at 04:06

## 2025-06-21 RX ADMIN — ALUMINUM HYDROXIDE, MAGNESIUM HYDROXIDE, AND DIMETHICONE 30 ML: 400; 400; 40 SUSPENSION ORAL at 09:06

## 2025-06-21 NOTE — ASSESSMENT & PLAN NOTE
1/2 cultures positive.  Will monitor carefully.  Possible that a contamination but given the patient presented with sepsis will treat if today to suggest that treatment is indicated.

## 2025-06-21 NOTE — PROGRESS NOTES
[facility] Medicine  Progress Note    Patient Name: Latha Palm  MRN: [id number]  Patient Class: IP- Inpatient   Admission Date: 6/18/2025  Length of Stay: 2 days  Attending Physician: Macho Choi MD  Primary Care Provider: No primary care provider on file.        Subjective     Principal Problem:Sepsis      HPI:  Patient is a 45 y/o female with a h/o poorly controlled type 2 DM on insulin with the last known HbA1c of 15 along with multiple DM complications including retinopathy and peripheral neuropathy who presented to ED after having been evaluated by her PCP for R hallux wound. Pt was found to have gas in the foot of Xray and pt was subsequently asked to come to ED for additional evaluation and intervention. Pt stated that she suffered the R hallux wound after falling in the shower 8 days ago. Since the patient has sensory deficit in the foot, pt did not think much of this until the last few days when she noticed foul smelling drainage from the R foot. This was also accompanied by subjective fever and chills which ultimately culminated in doctor's visit today.    On initial presentation, pt was tachycardic but VS were otherwise stable and afebrile. Work up was notable for presence of leukocytosis with left shift and Xray showing gas in the soft tissues of the distal great toe, generalized soft tissue swelling, and a 5 mm radiopaque foreign body projecting between the fourth and fifth metatarsals possibly plantar aspect.  Pt will be admitted with for further evaluation and intervention    Overview/Hospital Course:  No notes on file    Interval History:     Review of Systems   Constitutional:  Negative for chills and fever.   HENT:  Negative for postnasal drip and rhinorrhea.    Eyes:  Negative for itching.   Respiratory:  Negative for shortness of breath.    Cardiovascular:  Negative for chest pain, palpitations and leg swelling.   Gastrointestinal:   Negative for abdominal distention, abdominal pain, diarrhea, nausea and vomiting.   Endocrine: Negative for cold intolerance, heat intolerance, polydipsia, polyphagia and polyuria.   Genitourinary:  Negative for dysuria and hematuria.   Musculoskeletal:  Negative for arthralgias, joint swelling, myalgias, neck pain and neck stiffness.   Skin:         Foul smelling drainage eminating from the R big toe   Allergic/Immunologic: Negative for environmental allergies, food allergies and immunocompromised state.   Neurological:  Negative for dizziness, seizures, facial asymmetry, light-headedness and numbness.     Objective:     Vital Signs (Most Recent):  Temp: 97.5 °F (36.4 °C) (06/21/25 0353)  Pulse: 70 (06/21/25 0353)  Resp: 18 (06/21/25 0353)  BP: 108/71 (06/21/25 0353)  SpO2: (!) 93 % (06/21/25 0353) Vital Signs (24h Range):  Temp:  [97.5 °F (36.4 °C)-98 °F (36.7 °C)] 97.5 °F (36.4 °C)  Pulse:  [70-93] 70  Resp:  [18-22] 18  SpO2:  [92 %-99 %] 93 %  BP: ()/(57-85) 108/71     Weight: 89.9 kg (198 lb 3.1 oz)  Body mass index is 32.98 kg/m².    Intake/Output Summary (Last 24 hours) at 6/21/2025 0721  Last data filed at 6/21/2025 0703  Gross per 24 hour   Intake 2330 ml   Output --   Net 2330 ml         Physical Exam  Constitutional:       Appearance: Normal appearance. She is obese.   HENT:      Head: Normocephalic and atraumatic.      Nose: Nose normal.      Mouth/Throat:      Mouth: Mucous membranes are moist.   Eyes:      Extraocular Movements: Extraocular movements intact.   Cardiovascular:      Rate and Rhythm: Normal rate and regular rhythm.      Pulses: Normal pulses.      Heart sounds: Normal heart sounds.   Pulmonary:      Effort: Pulmonary effort is normal.      Breath sounds: Normal breath sounds.   Abdominal:      General: Bowel sounds are normal.      Palpations: Abdomen is soft.   Musculoskeletal:      Cervical back: Normal range of motion and neck supple.   Skin:     General: Skin is warm.       Capillary Refill: Capillary refill takes less than 2 seconds.      Comments: Foul smelling drainage emanating from the right hallux and erythema involving the entire right hallux were observed   Neurological:      General: No focal deficit present.      Mental Status: She is alert and oriented to person, place, and time.   Psychiatric:         Mood and Affect: Mood normal.         Behavior: Behavior normal.               Significant Labs: All pertinent labs within the past 24 hours have been reviewed.    Significant Imaging: I have reviewed all pertinent imaging results/findings within the past 24 hours.      Assessment & Plan  Sepsis  This patient does have evidence of infective focus  My overall impression is sepsis without organ dysfunction.  Source: Skin and Soft Tissue Infection: Cellulitis involving R hallux  Antibiotics given-   Antibiotics (72h ago, onward)      Vancomycin and Zosyn          Latest lactate reviewed-  Recent Labs   Lab 06/18/25  2310   LACTATE 0.9         Fluid challenge Fluid Not Needed - Patient is not hypotensive and/or lactate is less than 4.0.     Post- resuscitation assessment No - Post resuscitation assessment not needed       Will Not start Pressors- Levophed for MAP of 65  Source control achieved by: Empiric ABX  General surgery will be consulted for wound debridement  Type 2 diabetes mellitus, with long-term current use of insulin    Patient has a h/o poorly controlled DM with the last known HbA1c of 15. Patient also has DM complications including retinopathy and peripheral neuropathy. Will continue patient on 20 units of Lantus qhs and add moderate intensity SSI q 4 hours to maintain CBGs in the range of 130's to 180's    DM type 2 with diabetic peripheral neuropathy    Patient has no sensation in the bilateral lower extremities due to peripheral neuropathy stemming from complication of diabetes.  Patient also has some chronic neuropathic foot pain as well.  Continue present  "management with Neurontin and Elavil     Reactive airway disease without complication    Will start pt on scheduled Duoneb and budesonide neb txs    Diabetic infection of right foot  R great toe infection.   Plan to go to OR for debridement.     6/21: Follow-up wound and blood cultures.  Recommended patient remain in hospital at this time.    Antibiotics (From admission, onward)      Start     Stop Route Frequency Ordered    06/19/25 0230  vancomycin (VANCOCIN) 1,750 mg in 0.9% NaCl 500 mL IVPB         -- IV Every 18 hours 06/19/25 0108    06/19/25 0145  meropenem injection 1 g         -- IV Every 8 hours (non-standard times) 06/19/25 0049    06/19/25 0143  vancomycin - pharmacy to dose  ( Skin & Soft Tissue Infection: Purulent, Severe)        Placed in "And" Linked Group    -- IV pharmacy to manage frequency 06/19/25 0049            Cellulitis of right foot      Bacteremia  1/2 cultures positive.  Will monitor carefully.  Possible that a contamination but given the patient presented with sepsis will treat if today to suggest that treatment is indicated.    VTE Risk Mitigation (From admission, onward)           Ordered     enoxaparin injection 40 mg  Daily         06/19/25 0049     IP VTE HIGH RISK PATIENT  Once         06/19/25 0049     Place sequential compression device  Until discontinued         06/19/25 0049                    Discharge Planning   SHANIA: 6/23/2025     Code Status: Full Code   Medical Readiness for Discharge Date: 6/20/2025  Discharge Plan A: Home with family            Macho Choi MD  Department of Hospital Medicine   [facility]    "

## 2025-06-21 NOTE — PROGRESS NOTES
Pharmacokinetic Assessment Follow Up: IV Vancomycin    Vancomycin serum concentration assessment(s):    The random level was drawn correctly and can be used to guide therapy at this time. The measurement is below the desired definitive target range of 10 to 15 mcg/mL.    Vancomycin Regimen Plan:    Change regimen to Vancomycin 1750 mg IV every 12 hours with next serum trough concentration measured at 1930 prior to 4th dose on 6/22    Drug levels (last 3 results):  Recent Labs   Lab Result Units 06/21/25  0818   Vancomycin, Trough µg/mL 6.7*       Pharmacy will continue to follow and monitor vancomycin.    Please contact pharmacy at extension [phone] for questions regarding this assessment.    Patient brief summary:  Latha Palm is a 44 y.o. female initiated on antimicrobial therapy with IV Vancomycin for treatment of skin & soft tissue infection    The patient's current regimen is vanc 1750 mg IV q12h    Drug Allergies:   Review of patient's allergies indicates:   Allergen Reactions    Clindamycin Anaphylaxis    Penicillins Swelling    Amoxicillin     Bactrim [sulfamethoxazole-trimethoprim]     Cyclobenzaprine     Morphine     Opioids - morphine analogues     Promethazine     Toradol [ketorolac]     Tramadol        Actual Body Weight:   89.9 kg    Renal Function:   Estimated Creatinine Clearance: 141 mL/min (based on SCr of 0.57 mg/dL).,     Dialysis Method (if applicable):  N/A    CBC (last 72 hours):  Recent Labs   Lab Result Units 06/18/25  2310 06/20/25  0450 06/21/25  0452   WBC K/uL 14.84* 13.14* 9.14   Hemoglobin g/dL 13.0 11.9* 11.5*   Hematocrit % 39.3 36.9* 36.9*   Platelet Count K/uL 539* 477* 530*   Lymphocytes % % 21.9* 13.9* 44.5*   Lymphocytes, Man % % 25*  --  50*   Monocytes % % 6.9* 4.5 7.5*   Monocytes, Man % % 5  --  9*   Eosinophils % % 1.2 0.0* 1.4   Eosinophils, Man % %  --   --  3   Basophils % % 0.3 0.2 0.4   Diff Type  Manual Auto Manual       Metabolic Panel (last 72 hours):  Recent  Labs   Lab Result Units 06/18/25  2310 06/19/25  0810 06/20/25  0450 06/21/25  0452   Sodium mmol/L 135*  --  135* 140   Potassium mmol/L 3.8  --  4.2 4.0   Chloride mmol/L 102  --  105 109*   CO2 mmol/L 24  --  21* 23   Glucose mg/dL 117*  --  208* 136*   Glucose, UA mg/dL  --  >1000*  --   --    BUN mg/dL 16  --  13 13   Creatinine mg/dL 0.72  --  0.64 0.57   Albumin g/dL 2.9*  --  2.3* 2.2*   Bilirubin, Total mg/dL 0.4  --  0.2 0.1   Alk Phos U/L 138  --  111 96   AST U/L 22  --  20 16   ALT U/L 21  --  14 10   Magnesium mg/dL  --   --  2.3 2.4   Phosphorus mg/dL  --   --  3.0 2.3       Vancomycin Administrations:  vancomycin given in the last 96 hours                     vancomycin (VANCOCIN) 1,750 mg in 0.9% NaCl 500 mL IVPB (mg) 1,750 mg New Bag 06/21/25 0822     1,750 mg New Bag 06/20/25 1427     1,750 mg New Bag 06/19/25 2130     1,750 mg New Bag  0313                    Microbiologic Results:  Microbiology Results (last 7 days)       Procedure Component Value Units Date/Time    Culture, Wound [[id number]]  (Abnormal)  (Susceptibility) Collected: 06/19/25 1301    Order Status: Completed Specimen: Wound from Toe Updated: 06/21/25 0731     Culture, Wound/Abscess Moderate Growth Group G streptococcus    Blood Culture #2 [[id number]] Collected: 06/18/25 2315    Order Status: Completed Specimen: Blood from Peripheral, Wrist, Left Updated: 06/21/25 0721     Culture, Blood No Growth At 48 Hours    Blood Culture #1 [[id number]]  (Abnormal) Collected: 06/18/25 2310    Order Status: Completed Specimen: Blood from Peripheral, Forearm, Left Updated: 06/21/25 0705     Gram Stain Result Gram positive cocci     Comment: From Anaerobe bottle       Culture, Anaerobe [[id number]] Collected: 06/19/25 1301    Order Status: Resulted Specimen: Wound from Toe Updated: 06/19/25 2835

## 2025-06-21 NOTE — ASSESSMENT & PLAN NOTE
"R great toe infection.   Plan to go to OR for debridement.     6/21: Follow-up wound and blood cultures.  Recommended patient remain in hospital at this time.    Antibiotics (From admission, onward)      Start     Stop Route Frequency Ordered    06/21/25 2000  vancomycin (VANCOCIN) 1,750 mg in 0.9% NaCl 500 mL IVPB         -- IV Every 12 hours (non-standard times) 06/21/25 0941    06/19/25 0143  vancomycin - pharmacy to dose  ( Skin & Soft Tissue Infection: Purulent, Severe)        Placed in "And" Linked Group    -- IV pharmacy to manage frequency 06/19/25 0049            "

## 2025-06-21 NOTE — ASSESSMENT & PLAN NOTE
"R great toe infection.   Plan to go to OR for debridement.     6/21: Follow-up wound and blood cultures.  Recommended patient remain in hospital at this time.    Antibiotics (From admission, onward)      Start     Stop Route Frequency Ordered    06/19/25 0230  vancomycin (VANCOCIN) 1,750 mg in 0.9% NaCl 500 mL IVPB         -- IV Every 18 hours 06/19/25 0108    06/19/25 0145  meropenem injection 1 g         -- IV Every 8 hours (non-standard times) 06/19/25 0049    06/19/25 0143  vancomycin - pharmacy to dose  ( Skin & Soft Tissue Infection: Purulent, Severe)        Placed in "And" Linked Group    -- IV pharmacy to manage frequency 06/19/25 0049            "

## 2025-06-21 NOTE — PROGRESS NOTES
[facility] - [facility] Hospital Medicine  Progress Note    Patient Name: Latha Palm  MRN: [id number]  Patient Class: IP- Inpatient   Admission Date: 6/18/2025  Length of Stay: 2 days  Attending Physician: Macho Choi MD  Primary Care Provider: No primary care provider on file.        Subjective     Principal Problem:Sepsis      HPI:  Patient is a 43 y/o female with a h/o poorly controlled type 2 DM on insulin with the last known HbA1c of 15 along with multiple DM complications including retinopathy and peripheral neuropathy who presented to ED after having been evaluated by her PCP for R hallux wound. Pt was found to have gas in the foot of Xray and pt was subsequently asked to come to ED for additional evaluation and intervention. Pt stated that she suffered the R hallux wound after falling in the shower 8 days ago. Since the patient has sensory deficit in the foot, pt did not think much of this until the last few days when she noticed foul smelling drainage from the R foot. This was also accompanied by subjective fever and chills which ultimately culminated in doctor's visit today.    On initial presentation, pt was tachycardic but VS were otherwise stable and afebrile. Work up was notable for presence of leukocytosis with left shift and Xray showing gas in the soft tissues of the distal great toe, generalized soft tissue swelling, and a 5 mm radiopaque foreign body projecting between the fourth and fifth metatarsals possibly plantar aspect.  Pt will be admitted with for further evaluation and intervention    Overview/Hospital Course:  No notes on file    Interval History:     Review of Systems   Constitutional:  Negative for chills and fever.   HENT:  Negative for postnasal drip and rhinorrhea.    Eyes:  Negative for itching.   Respiratory:  Negative for shortness of breath.    Cardiovascular:  Negative for chest pain, palpitations and leg swelling.   Gastrointestinal:   Negative for abdominal distention, abdominal pain, diarrhea, nausea and vomiting.   Endocrine: Negative for cold intolerance, heat intolerance, polydipsia, polyphagia and polyuria.   Genitourinary:  Negative for dysuria and hematuria.   Musculoskeletal:  Negative for arthralgias, joint swelling, myalgias, neck pain and neck stiffness.   Skin:         Foul smelling drainage eminating from the R big toe   Allergic/Immunologic: Negative for environmental allergies, food allergies and immunocompromised state.   Neurological:  Negative for dizziness, seizures, facial asymmetry, light-headedness and numbness.   All other systems reviewed and are negative.    Objective:     Vital Signs (Most Recent):  Temp: 97.9 °F (36.6 °C) (06/21/25 1622)  Pulse: 77 (06/21/25 1622)  Resp: 16 (06/21/25 0801)  BP: 138/83 (06/21/25 1622)  SpO2: 98 % (06/21/25 1622) Vital Signs (24h Range):  Temp:  [97.5 °F (36.4 °C)-97.9 °F (36.6 °C)] 97.9 °F (36.6 °C)  Pulse:  [70-85] 77  Resp:  [16-22] 16  SpO2:  [92 %-99 %] 98 %  BP: ()/(57-83) 138/83     Weight: 89.9 kg (198 lb 3.1 oz)  Body mass index is 32.98 kg/m².    Intake/Output Summary (Last 24 hours) at 6/21/2025 1738  Last data filed at 6/21/2025 1628  Gross per 24 hour   Intake 3025 ml   Output --   Net 3025 ml         Physical Exam  Constitutional:       Appearance: Normal appearance. She is obese.   HENT:      Head: Normocephalic and atraumatic.      Nose: Nose normal.      Mouth/Throat:      Mouth: Mucous membranes are moist.   Eyes:      Extraocular Movements: Extraocular movements intact.   Cardiovascular:      Rate and Rhythm: Normal rate and regular rhythm.      Pulses: Normal pulses.      Heart sounds: Normal heart sounds.   Pulmonary:      Effort: Pulmonary effort is normal.      Breath sounds: Normal breath sounds.   Abdominal:      General: Bowel sounds are normal.      Palpations: Abdomen is soft.   Musculoskeletal:      Cervical back: Normal range of motion and neck supple.    Skin:     General: Skin is warm.      Capillary Refill: Capillary refill takes less than 2 seconds.      Comments: Foul smelling drainage emanating from the right hallux and erythema involving the entire right hallux were observed   Neurological:      General: No focal deficit present.      Mental Status: She is alert and oriented to person, place, and time.   Psychiatric:         Mood and Affect: Mood normal.         Behavior: Behavior normal.               Significant Labs: All pertinent labs within the past 24 hours have been reviewed.    Significant Imaging: I have reviewed all pertinent imaging results/findings within the past 24 hours.      Assessment & Plan  Sepsis  This patient does have evidence of infective focus  My overall impression is sepsis without organ dysfunction.  Source: Skin and Soft Tissue Infection: Cellulitis involving R hallux  Antibiotics given-   Antibiotics (72h ago, onward)      Vancomycin and Zosyn          Latest lactate reviewed-  Recent Labs   Lab 06/18/25  2310   LACTATE 0.9         Fluid challenge Fluid Not Needed - Patient is not hypotensive and/or lactate is less than 4.0.     Post- resuscitation assessment No - Post resuscitation assessment not needed       Will Not start Pressors- Levophed for MAP of 65  Source control achieved by: Empiric ABX  General surgery will be consulted for wound debridement  Type 2 diabetes mellitus, with long-term current use of insulin    Patient has a h/o poorly controlled DM with the last known HbA1c of 15. Patient also has DM complications including retinopathy and peripheral neuropathy. Will continue patient on 20 units of Lantus qhs and add moderate intensity SSI q 4 hours to maintain CBGs in the range of 130's to 180's    DM type 2 with diabetic peripheral neuropathy    Patient has no sensation in the bilateral lower extremities due to peripheral neuropathy stemming from complication of diabetes.  Patient also has some chronic neuropathic  "foot pain as well.  Continue present management with Neurontin and Elavil     Reactive airway disease without complication    Will start pt on scheduled Duoneb and budesonide neb txs    Diabetic infection of right foot  R great toe infection.   Plan to go to OR for debridement.     6/21: Follow-up wound and blood cultures.  Recommended patient remain in hospital at this time.    Antibiotics (From admission, onward)      Start     Stop Route Frequency Ordered    06/21/25 2000  vancomycin (VANCOCIN) 1,750 mg in 0.9% NaCl 500 mL IVPB         -- IV Every 12 hours (non-standard times) 06/21/25 0941    06/19/25 0143  vancomycin - pharmacy to dose  ( Skin & Soft Tissue Infection: Purulent, Severe)        Placed in "And" Linked Group    -- IV pharmacy to manage frequency 06/19/25 0049            Cellulitis of right foot      Bacteremia  1/2 cultures positive.  Will monitor carefully.  Possible that a contamination but given the patient presented with sepsis will treat if today to suggest that treatment is indicated.    VTE Risk Mitigation (From admission, onward)           Ordered     enoxaparin injection 40 mg  Daily         06/19/25 0049     IP VTE HIGH RISK PATIENT  Once         06/19/25 0049     Place sequential compression device  Until discontinued         06/19/25 0049                    Discharge Planning   SHANIA: 6/23/2025     Code Status: Full Code   Medical Readiness for Discharge Date: 6/20/2025  Discharge Plan A: Home with family              Macho Choi MD  Department of Hospital Medicine   [facility] - [facility]    "

## 2025-06-21 NOTE — SUBJECTIVE & OBJECTIVE
Interval History:     Review of Systems   Constitutional:  Negative for chills and fever.   HENT:  Negative for postnasal drip and rhinorrhea.    Eyes:  Negative for itching.   Respiratory:  Negative for shortness of breath.    Cardiovascular:  Negative for chest pain, palpitations and leg swelling.   Gastrointestinal:  Negative for abdominal distention, abdominal pain, diarrhea, nausea and vomiting.   Endocrine: Negative for cold intolerance, heat intolerance, polydipsia, polyphagia and polyuria.   Genitourinary:  Negative for dysuria and hematuria.   Musculoskeletal:  Negative for arthralgias, joint swelling, myalgias, neck pain and neck stiffness.   Skin:         Foul smelling drainage eminating from the R big toe   Allergic/Immunologic: Negative for environmental allergies, food allergies and immunocompromised state.   Neurological:  Negative for dizziness, seizures, facial asymmetry, light-headedness and numbness.   All other systems reviewed and are negative.    Objective:     Vital Signs (Most Recent):  Temp: 97.9 °F (36.6 °C) (06/21/25 1622)  Pulse: 77 (06/21/25 1622)  Resp: 16 (06/21/25 0801)  BP: 138/83 (06/21/25 1622)  SpO2: 98 % (06/21/25 1622) Vital Signs (24h Range):  Temp:  [97.5 °F (36.4 °C)-97.9 °F (36.6 °C)] 97.9 °F (36.6 °C)  Pulse:  [70-85] 77  Resp:  [16-22] 16  SpO2:  [92 %-99 %] 98 %  BP: ()/(57-83) 138/83     Weight: 89.9 kg (198 lb 3.1 oz)  Body mass index is 32.98 kg/m².    Intake/Output Summary (Last 24 hours) at 6/21/2025 1738  Last data filed at 6/21/2025 1628  Gross per 24 hour   Intake 3025 ml   Output --   Net 3025 ml         Physical Exam  Constitutional:       Appearance: Normal appearance. She is obese.   HENT:      Head: Normocephalic and atraumatic.      Nose: Nose normal.      Mouth/Throat:      Mouth: Mucous membranes are moist.   Eyes:      Extraocular Movements: Extraocular movements intact.   Cardiovascular:      Rate and Rhythm: Normal rate and regular rhythm.       Pulses: Normal pulses.      Heart sounds: Normal heart sounds.   Pulmonary:      Effort: Pulmonary effort is normal.      Breath sounds: Normal breath sounds.   Abdominal:      General: Bowel sounds are normal.      Palpations: Abdomen is soft.   Musculoskeletal:      Cervical back: Normal range of motion and neck supple.   Skin:     General: Skin is warm.      Capillary Refill: Capillary refill takes less than 2 seconds.      Comments: Foul smelling drainage emanating from the right hallux and erythema involving the entire right hallux were observed   Neurological:      General: No focal deficit present.      Mental Status: She is alert and oriented to person, place, and time.   Psychiatric:         Mood and Affect: Mood normal.         Behavior: Behavior normal.               Significant Labs: All pertinent labs within the past 24 hours have been reviewed.    Significant Imaging: I have reviewed all pertinent imaging results/findings within the past 24 hours.

## 2025-06-21 NOTE — SUBJECTIVE & OBJECTIVE
Interval History:     Review of Systems   Constitutional:  Negative for chills and fever.   HENT:  Negative for postnasal drip and rhinorrhea.    Eyes:  Negative for itching.   Respiratory:  Negative for shortness of breath.    Cardiovascular:  Negative for chest pain, palpitations and leg swelling.   Gastrointestinal:  Negative for abdominal distention, abdominal pain, diarrhea, nausea and vomiting.   Endocrine: Negative for cold intolerance, heat intolerance, polydipsia, polyphagia and polyuria.   Genitourinary:  Negative for dysuria and hematuria.   Musculoskeletal:  Negative for arthralgias, joint swelling, myalgias, neck pain and neck stiffness.   Skin:         Foul smelling drainage eminating from the R big toe   Allergic/Immunologic: Negative for environmental allergies, food allergies and immunocompromised state.   Neurological:  Negative for dizziness, seizures, facial asymmetry, light-headedness and numbness.     Objective:     Vital Signs (Most Recent):  Temp: 97.5 °F (36.4 °C) (06/21/25 0353)  Pulse: 70 (06/21/25 0353)  Resp: 18 (06/21/25 0353)  BP: 108/71 (06/21/25 0353)  SpO2: (!) 93 % (06/21/25 0353) Vital Signs (24h Range):  Temp:  [97.5 °F (36.4 °C)-98 °F (36.7 °C)] 97.5 °F (36.4 °C)  Pulse:  [70-93] 70  Resp:  [18-22] 18  SpO2:  [92 %-99 %] 93 %  BP: ()/(57-85) 108/71     Weight: 89.9 kg (198 lb 3.1 oz)  Body mass index is 32.98 kg/m².    Intake/Output Summary (Last 24 hours) at 6/21/2025 0721  Last data filed at 6/21/2025 0703  Gross per 24 hour   Intake 2330 ml   Output --   Net 2330 ml         Physical Exam  Constitutional:       Appearance: Normal appearance. She is obese.   HENT:      Head: Normocephalic and atraumatic.      Nose: Nose normal.      Mouth/Throat:      Mouth: Mucous membranes are moist.   Eyes:      Extraocular Movements: Extraocular movements intact.   Cardiovascular:      Rate and Rhythm: Normal rate and regular rhythm.      Pulses: Normal pulses.      Heart sounds:  Normal heart sounds.   Pulmonary:      Effort: Pulmonary effort is normal.      Breath sounds: Normal breath sounds.   Abdominal:      General: Bowel sounds are normal.      Palpations: Abdomen is soft.   Musculoskeletal:      Cervical back: Normal range of motion and neck supple.   Skin:     General: Skin is warm.      Capillary Refill: Capillary refill takes less than 2 seconds.      Comments: Foul smelling drainage emanating from the right hallux and erythema involving the entire right hallux were observed   Neurological:      General: No focal deficit present.      Mental Status: She is alert and oriented to person, place, and time.   Psychiatric:         Mood and Affect: Mood normal.         Behavior: Behavior normal.               Significant Labs: All pertinent labs within the past 24 hours have been reviewed.    Significant Imaging: I have reviewed all pertinent imaging results/findings within the past 24 hours.

## 2025-06-21 NOTE — PROGRESS NOTES
[facility] - [address] [place] [address]  General Surgery  Progress Note    Subjective:     Interval History:  Wound VAC in place issues    Post-Op Info:  Procedure(s) (LRB):  AMPUTATION, TOE (Right)   2 Days Post-Op      Medications:  Continuous Infusions:   0.9% NaCl   Intravenous Continuous 100 mL/hr at 06/21/25 1414 New Bag at 06/21/25 1414     Scheduled Meds:   amitriptyline  10 mg Oral QHS    budesonide  0.5 mg Nebulization Q12H    enoxparin  40 mg Subcutaneous Daily    gabapentin  400 mg Oral TID    insulin glargine U-100 (Lantus)  20 Units Subcutaneous QHS    meropenem IV (PEDS and ADULTS)  1 g Intravenous Q8H    ondansetron  4 mg Intravenous Once    vancomycin (VANCOCIN) IV (PEDS and ADULTS)  1,750 mg Intravenous Q12H     PRN Meds:  Current Facility-Administered Medications:     acetaminophen, 650 mg, Oral, Q6H PRN    albuterol-ipratropium, 3 mL, Nebulization, Q6H PRN    aluminum & magnesium hydroxide-simethicone, 30 mL, Oral, Q6H PRN    dextrose 50%, 12.5 g, Intravenous, PRN    dextrose 50%, 25 g, Intravenous, PRN    diphenhydrAMINE, 25 mg, Oral, Q6H PRN    glucagon (human recombinant), 1 mg, Intramuscular, PRN    glucose, 16 g, Oral, PRN    glucose, 24 g, Oral, PRN    HYDROmorphone, 0.5 mg, Intravenous, Q3H PRN    insulin aspart U-100, 0-10 Units, Subcutaneous, Q4H PRN    ondansetron, 4 mg, Intravenous, Q6H PRN    oxyCODONE-acetaminophen, 1 tablet, Oral, Q6H PRN    prochlorperazine, 5 mg, Intravenous, Q6H PRN    Pharmacy to dose Vancomycin consult, , , Once **AND** vancomycin - pharmacy to dose, , Intravenous, pharmacy to manage frequency     Objective:     Vital Signs (Most Recent):  Temp: 97.8 °F (36.6 °C) (06/21/25 1140)  Pulse: 77 (06/21/25 1140)  Resp: 16 (06/21/25 0801)  BP: 107/61 (06/21/25 1140)  SpO2: 95 % (06/21/25 1140) Vital Signs (24h Range):  Temp:  [97.5 °F (36.4 °C)-97.9 °F (36.6 °C)] 97.8 °F (36.6 °C)  Pulse:  [70-88] 77  Resp:  [16-22] 16  SpO2:  [92 %-99 %] 95 %  BP:  "(89129)/(57-82) 107/61       Intake/Output Summary (Last 24 hours) at 6/21/2025 1416  Last data filed at 6/21/2025 0703  Gross per 24 hour   Intake 2310 ml   Output --   Net 2310 ml       Physical Exam  Constitutional:       General: She is not in acute distress.  HENT:      Head: Normocephalic.   Cardiovascular:      Rate and Rhythm: Normal rate and regular rhythm.      Pulses: Normal pulses.   Pulmonary:      Effort: Pulmonary effort is normal. No respiratory distress.      Breath sounds: Normal breath sounds.   Abdominal:      General: Abdomen is flat. There is no distension.      Palpations: Abdomen is soft.      Tenderness: There is no abdominal tenderness.   Musculoskeletal:         General: Normal range of motion.   Skin:     General: Skin is warm.   Neurological:      General: No focal deficit present.      Mental Status: She is oriented to person, place, and time.         Significant Labs:  Cardiac markers: No results for input(s): "CKMB", "CPKMB", "TROPONINT", "TROPONINI", "MYOGLOBIN" in the last 48 hours.  CBC:   Recent Labs   Lab 06/21/25  0452   WBC 9.14   RBC 3.94*   HGB 11.5*   HCT 36.9*   *   MCV 93.7   MCH 29.2   MCHC 31.2*     CMP:   Recent Labs   Lab 06/21/25  0452   *   CALCIUM 8.1*   ALBUMIN 2.2*   PROT 6.4      K 4.0   CO2 23   *   BUN 13   CREATININE 0.57   ALKPHOS 96   ALT 10   AST 16   BILITOT 0.1       Significant Diagnostics:  I have reviewed all pertinent imaging results/findings within the past 24 hours.    Assessment/Plan:     Active Diagnoses:    Diagnosis Date Noted POA    PRINCIPAL PROBLEM:  Sepsis [A41.9] 06/18/2025 Yes    Bacteremia [R78.81] 06/21/2025 Yes    Diabetic infection of right foot [E11.628, L08.9] 06/19/2025 Yes    Cellulitis of right foot [L03.115] 06/19/2025 Yes    DM type 2 with diabetic peripheral neuropathy [E11.42] 06/18/2025 Yes    Reactive airway disease without complication [J45.909] 06/18/2025 Yes    Type 2 diabetes mellitus, with " long-term current use of insulin [E11.9, Z79.4] 01/22/2024 Not Applicable      Problems Resolved During this Admission:     Looks good from a surgery standpoint.  Will sign off DC per primary team    Jeff Sanchez MD  General Surgery  [facility] - [address] [place] [address]

## 2025-06-22 VITALS
HEIGHT: 65 IN | OXYGEN SATURATION: 95 % | DIASTOLIC BLOOD PRESSURE: 88 MMHG | TEMPERATURE: 98 F | HEART RATE: 79 BPM | SYSTOLIC BLOOD PRESSURE: 142 MMHG | WEIGHT: 198.19 LBS | BODY MASS INDEX: 33.02 KG/M2 | RESPIRATION RATE: 18 BRPM

## 2025-06-22 LAB
ALBUMIN SERPL BCP-MCNC: 2.7 G/DL (ref 3.5–5)
ALBUMIN/GLOB SERPL: 0.6 {RATIO}
ALP SERPL-CCNC: 93 U/L (ref 40–150)
ALT SERPL W P-5'-P-CCNC: 16 U/L
ANION GAP SERPL CALCULATED.3IONS-SCNC: 10 MMOL/L (ref 7–16)
AST SERPL W P-5'-P-CCNC: 24 U/L (ref 11–45)
ATYPICAL LYMPHOCYTES: ABNORMAL
BACTERIA BLD CULT: ABNORMAL
BASOPHILS # BLD AUTO: 0.03 K/UL (ref 0–0.2)
BASOPHILS NFR BLD AUTO: 0.4 % (ref 0–1)
BILIRUB SERPL-MCNC: 0.1 MG/DL
BUN SERPL-MCNC: 9 MG/DL (ref 7–19)
BUN/CREAT SERPL: 13 (ref 6–20)
CALCIUM SERPL-MCNC: 8.7 MG/DL (ref 8.4–10.2)
CHLORIDE SERPL-SCNC: 104 MMOL/L (ref 98–107)
CO2 SERPL-SCNC: 29 MMOL/L (ref 22–29)
CREAT SERPL-MCNC: 0.67 MG/DL (ref 0.55–1.02)
DIFFERENTIAL METHOD BLD: ABNORMAL
EGFR (NO RACE VARIABLE) (RUSH/TITUS): 111 ML/MIN/1.73M2
EOSINOPHIL # BLD AUTO: 0.21 K/UL (ref 0–0.5)
EOSINOPHIL NFR BLD AUTO: 2.6 % (ref 1–4)
ERYTHROCYTE [DISTWIDTH] IN BLOOD BY AUTOMATED COUNT: 13.2 % (ref 11.5–14.5)
GLOBULIN SER-MCNC: 4.7 G/DL (ref 2–4)
GLUCOSE SERPL-MCNC: 102 MG/DL (ref 70–105)
GLUCOSE SERPL-MCNC: 115 MG/DL (ref 74–100)
GLUCOSE SERPL-MCNC: 141 MG/DL (ref 70–105)
GRAM STN SPEC: ABNORMAL
HCT VFR BLD AUTO: 40.2 % (ref 38–47)
HGB BLD-MCNC: 12.7 G/DL (ref 12–16)
IMM GRANULOCYTES # BLD AUTO: 0.12 K/UL (ref 0–0.04)
IMM GRANULOCYTES NFR BLD: 1.5 % (ref 0–0.4)
LYMPHOCYTES # BLD AUTO: 3.59 K/UL (ref 1–4.8)
LYMPHOCYTES NFR BLD AUTO: 44.4 % (ref 27–41)
LYMPHOCYTES NFR BLD MANUAL: 42 % (ref 27–41)
MAGNESIUM SERPL-MCNC: 2.4 MG/DL (ref 1.6–2.6)
MCH RBC QN AUTO: 28.3 PG (ref 27–31)
MCHC RBC AUTO-ENTMCNC: 31.6 G/DL (ref 32–36)
MCV RBC AUTO: 89.7 FL (ref 80–96)
MONOCYTES # BLD AUTO: 0.56 K/UL (ref 0–0.8)
MONOCYTES NFR BLD AUTO: 6.9 % (ref 2–6)
MONOCYTES NFR BLD MANUAL: 9 % (ref 2–6)
MPC BLD CALC-MCNC: 9.2 FL (ref 9.4–12.4)
NEUTROPHILS # BLD AUTO: 3.57 K/UL (ref 1.8–7.7)
NEUTROPHILS NFR BLD AUTO: 44.2 % (ref 53–65)
NEUTS BAND NFR BLD MANUAL: 1 % (ref 1–5)
NEUTS SEG NFR BLD MANUAL: 48 % (ref 50–62)
NRBC # BLD AUTO: 0 X10E3/UL
NRBC, AUTO (.00): 0 %
PHOSPHATE SERPL-MCNC: 2.8 MG/DL (ref 2.3–4.7)
PLATELET # BLD AUTO: 573 K/UL (ref 150–400)
PLATELET MORPHOLOGY: ABNORMAL
POLYCHROMASIA BLD QL SMEAR: ABNORMAL
POTASSIUM SERPL-SCNC: 3.9 MMOL/L (ref 3.5–5.1)
PROT SERPL-MCNC: 7.4 G/DL (ref 6.4–8.3)
RBC # BLD AUTO: 4.48 M/UL (ref 4.2–5.4)
SODIUM SERPL-SCNC: 139 MMOL/L (ref 136–145)
WBC # BLD AUTO: 8.08 K/UL (ref 4.5–11)

## 2025-06-22 PROCEDURE — 82962 GLUCOSE BLOOD TEST: CPT

## 2025-06-22 PROCEDURE — 83735 ASSAY OF MAGNESIUM: CPT | Performed by: STUDENT IN AN ORGANIZED HEALTH CARE EDUCATION/TRAINING PROGRAM

## 2025-06-22 PROCEDURE — 85025 COMPLETE CBC W/AUTO DIFF WBC: CPT | Performed by: STUDENT IN AN ORGANIZED HEALTH CARE EDUCATION/TRAINING PROGRAM

## 2025-06-22 PROCEDURE — 25000003 PHARM REV CODE 250: Performed by: HOSPITALIST

## 2025-06-22 PROCEDURE — 84100 ASSAY OF PHOSPHORUS: CPT | Performed by: STUDENT IN AN ORGANIZED HEALTH CARE EDUCATION/TRAINING PROGRAM

## 2025-06-22 PROCEDURE — 94761 N-INVAS EAR/PLS OXIMETRY MLT: CPT

## 2025-06-22 PROCEDURE — 25000003 PHARM REV CODE 250: Performed by: STUDENT IN AN ORGANIZED HEALTH CARE EDUCATION/TRAINING PROGRAM

## 2025-06-22 PROCEDURE — 25000242 PHARM REV CODE 250 ALT 637 W/ HCPCS: Performed by: STUDENT IN AN ORGANIZED HEALTH CARE EDUCATION/TRAINING PROGRAM

## 2025-06-22 PROCEDURE — 63600175 PHARM REV CODE 636 W HCPCS: Performed by: HOSPITALIST

## 2025-06-22 PROCEDURE — 80053 COMPREHEN METABOLIC PANEL: CPT | Performed by: STUDENT IN AN ORGANIZED HEALTH CARE EDUCATION/TRAINING PROGRAM

## 2025-06-22 PROCEDURE — 36415 COLL VENOUS BLD VENIPUNCTURE: CPT | Performed by: STUDENT IN AN ORGANIZED HEALTH CARE EDUCATION/TRAINING PROGRAM

## 2025-06-22 PROCEDURE — 94640 AIRWAY INHALATION TREATMENT: CPT

## 2025-06-22 RX ORDER — LEVOFLOXACIN 750 MG/1
750 TABLET, FILM COATED ORAL DAILY
Status: DISCONTINUED | OUTPATIENT
Start: 2025-06-22 | End: 2025-06-22 | Stop reason: HOSPADM

## 2025-06-22 RX ORDER — LEVOFLOXACIN 500 MG/1
500 TABLET, FILM COATED ORAL DAILY
Qty: 10 TABLET | Refills: 0 | Status: SHIPPED | OUTPATIENT
Start: 2025-06-23 | End: 2025-06-22 | Stop reason: HOSPADM

## 2025-06-22 RX ORDER — AZITHROMYCIN 500 MG/1
500 TABLET, FILM COATED ORAL DAILY
Qty: 6 TABLET | Refills: 0 | Status: SHIPPED | OUTPATIENT
Start: 2025-06-22 | End: 2025-06-28

## 2025-06-22 RX ORDER — DIPHENHYDRAMINE HCL 25 MG
25 CAPSULE ORAL ONCE
Status: DISCONTINUED | OUTPATIENT
Start: 2025-06-22 | End: 2025-06-22 | Stop reason: HOSPADM

## 2025-06-22 RX ADMIN — DIPHENHYDRAMINE HYDROCHLORIDE 25 MG: 25 CAPSULE ORAL at 12:06

## 2025-06-22 RX ADMIN — GABAPENTIN 400 MG: 400 CAPSULE ORAL at 03:06

## 2025-06-22 RX ADMIN — SODIUM CHLORIDE 1750 MG: 9 INJECTION, SOLUTION INTRAVENOUS at 09:06

## 2025-06-22 RX ADMIN — LEVOFLOXACIN 750 MG: 750 TABLET, FILM COATED ORAL at 11:06

## 2025-06-22 RX ADMIN — GABAPENTIN 400 MG: 400 CAPSULE ORAL at 08:06

## 2025-06-22 RX ADMIN — BUDESONIDE INHALATION 0.5 MG: 0.5 SUSPENSION RESPIRATORY (INHALATION) at 07:06

## 2025-06-22 RX ADMIN — SODIUM CHLORIDE: 9 INJECTION, SOLUTION INTRAVENOUS at 09:06

## 2025-06-22 NOTE — ASSESSMENT & PLAN NOTE
1/2 cultures positive.  Will monitor carefully.  Possible that a contamination but given the patient presented with sepsis will treat if today to suggest that treatment is indicated.    Most likely a contaminant.  F/u culture.  Will receive two weeks of antibiotics regardless.

## 2025-06-22 NOTE — ASSESSMENT & PLAN NOTE
"This patient does have evidence of infective focus  My overall impression is sepsis without organ dysfunction.  Source: Skin and Soft Tissue Infection: Cellulitis involving R hallux  Antibiotics given-   Antibiotics (72h ago, onward)      Vancomycin and Zosyn          Latest lactate reviewed-  No results for input(s): "LACTATE", "POCLAC" in the last 72 hours.        Fluid challenge Fluid Not Needed - Patient is not hypotensive and/or lactate is less than 4.0.     Post- resuscitation assessment No - Post resuscitation assessment not needed       Will Not start Pressors- Levophed for MAP of 65  Source control achieved by: Empiric ABX  General surgery will be consulted for wound debridement  "

## 2025-06-22 NOTE — DISCHARGE SUMMARY
[facility] Hospital Medicine  Discharge Summary      Patient Name: Latha Palm  MRN: [id number]  NAYA: [id number]  Patient Class: IP- Inpatient  Admission Date: 6/18/2025  Hospital Length of Stay: 3 days  Discharge Date and Time: 06/22/2025 10:26 AM  Attending Physician: Macho Choi MD   Discharging Provider: Macho Choi MD  Primary Care Provider: No primary care provider on file.    Primary Care Team: Networked reference to record PCT     HPI:   Patient is a 43 y/o female with a h/o poorly controlled type 2 DM on insulin with the last known HbA1c of 15 along with multiple DM complications including retinopathy and peripheral neuropathy who presented to ED after having been evaluated by her PCP for R hallux wound. Pt was found to have gas in the foot of Xray and pt was subsequently asked to come to ED for additional evaluation and intervention. Pt stated that she suffered the R hallux wound after falling in the shower 8 days ago. Since the patient has sensory deficit in the foot, pt did not think much of this until the last few days when she noticed foul smelling drainage from the R foot. This was also accompanied by subjective fever and chills which ultimately culminated in doctor's visit today.    On initial presentation, pt was tachycardic but VS were otherwise stable and afebrile. Work up was notable for presence of leukocytosis with left shift and Xray showing gas in the soft tissues of the distal great toe, generalized soft tissue swelling, and a 5 mm radiopaque foreign body projecting between the fourth and fifth metatarsals possibly plantar aspect.  Pt will be admitted with for further evaluation and intervention    Procedure(s) (LRB):  AMPUTATION, TOE (Right)      Hospital Course:   No notes on file     Goals of Care Treatment Preferences:  Code Status: Full Code         Consults:   Consults (From admission, onward)          Status Ordering  "Provider     Inpatient consult to General Surgery  Once        Provider:  Kia Fitch, DO    Completed SANDI, MIN S     Pharmacy to dose Vancomycin consult  Once        Provider:  (Not yet assigned)   Placed in "And" Linked Group    Acknowledged KIA FITCH            Assessment & Plan  Sepsis  This patient does have evidence of infective focus  My overall impression is sepsis without organ dysfunction.  Source: Skin and Soft Tissue Infection: Cellulitis involving R hallux  Antibiotics given-   Antibiotics (72h ago, onward)      Vancomycin and Zosyn          Latest lactate reviewed-  No results for input(s): "LACTATE", "POCLAC" in the last 72 hours.        Fluid challenge Fluid Not Needed - Patient is not hypotensive and/or lactate is less than 4.0.     Post- resuscitation assessment No - Post resuscitation assessment not needed       Will Not start Pressors- Levophed for MAP of 65  Source control achieved by: Empiric ABX  General surgery will be consulted for wound debridement  Type 2 diabetes mellitus, with long-term current use of insulin    Patient has a h/o poorly controlled DM with the last known HbA1c of 15. Patient also has DM complications including retinopathy and peripheral neuropathy. Will continue patient on 20 units of Lantus qhs and add moderate intensity SSI q 4 hours to maintain CBGs in the range of 130's to 180's    DM type 2 with diabetic peripheral neuropathy    Patient has no sensation in the bilateral lower extremities due to peripheral neuropathy stemming from complication of diabetes.  Patient also has some chronic neuropathic foot pain as well.  Continue present management with Neurontin and Elavil     Reactive airway disease without complication    Will start pt on scheduled Duoneb and budesonide neb txs    Diabetic infection of right foot  R great toe infection.   Plan to go to OR for debridement.     6/21: Follow-up wound and blood cultures.  Recommended patient remain in hospital " "at this time.    Antibiotics (From admission, onward)      Start     Stop Route Frequency Ordered    06/22/25 1030  levoFLOXacin tablet 750 mg         -- Oral Daily 06/22/25 1023    06/21/25 2000  vancomycin (VANCOCIN) 1,750 mg in 0.9% NaCl 500 mL IVPB         -- IV Every 12 hours (non-standard times) 06/21/25 0941    06/19/25 0143  vancomycin - pharmacy to dose  ( Skin & Soft Tissue Infection: Purulent, Severe)        Placed in "And" Linked Group    -- IV pharmacy to manage frequency 06/19/25 0049            Cellulitis of right foot      Bacteremia  1/2 cultures positive.  Will monitor carefully.  Possible that a contamination but given the patient presented with sepsis will treat if today to suggest that treatment is indicated.    Most likely a contaminant.  F/u culture.  Will receive two weeks of antibiotics regardless.     Final Active Diagnoses:    Diagnosis Date Noted POA    PRINCIPAL PROBLEM:  Diabetic infection of right foot [E11.628, L08.9] 06/19/2025 Yes    Sepsis [A41.9] 06/18/2025 Yes    Bacteremia [R78.81] 06/21/2025 Yes    Cellulitis of right foot [L03.115] 06/19/2025 Yes    DM type 2 with diabetic peripheral neuropathy [E11.42] 06/18/2025 Yes    Reactive airway disease without complication [J45.909] 06/18/2025 Yes    Type 2 diabetes mellitus, with long-term current use of insulin [E11.9, Z79.4] 01/22/2024 Not Applicable      Problems Resolved During this Admission:       Discharged Condition: fair    Disposition: Home or Self Care    Follow Up:   Follow-up Information       Montana Fitch, DO Follow up in 2 week(s).    Specialties: General Surgery, Surgery  Why: hospital follow-up for foot wound s/p debridement.  Contact information:  [address] [facility]  [place] [id number] [phone]                           Patient Instructions:   No discharge procedures on file.    Significant Diagnostic Studies: Labs: BMP:   Recent Labs   Lab 06/21/25  0452 06/22/25  0413 "   * 115*    139   K 4.0 3.9   * 104   CO2 23 29   BUN 13 9   CREATININE 0.57 0.67   CALCIUM 8.1* 8.7   MG 2.4 2.4    and CBC   Recent Labs   Lab 06/21/25  0452 06/22/25  0413   WBC 9.14 8.08   HGB 11.5* 12.7   HCT 36.9* 40.2   * 573*       Pending Diagnostic Studies:       None           Medications:  Reconciled Home Medications:      Medication List        START taking these medications      levoFLOXacin 500 MG tablet  Commonly known as: LEVAQUIN  Take 1 tablet (500 mg total) by mouth once daily. for 10 days  Start taking on: June 23, 2025            CONTINUE taking these medications      * albuterol 5 mg/mL nebulizer solution  Commonly known as: PROVENTIL  Take 2.5 mg by nebulization every 6 (six) hours as needed for Wheezing. Rescue     * albuterol 90 mcg/actuation inhaler  Commonly known as: PROVENTIL/VENTOLIN HFA  Inhale 2 puffs into the lungs every 6 (six) hours as needed.     amitriptyline 10 MG tablet  Commonly known as: ELAVIL  Take 1 tablet (10 mg total) by mouth every evening.     atorvastatin 10 MG tablet  Commonly known as: LIPITOR  Take 1 tablet (10 mg total) by mouth once daily.     cetirizine 10 MG tablet  Commonly known as: ZYRTEC  Take 10 mg by mouth every evening.     empagliflozin 25 mg tablet  Commonly known as: JARDIANCE  Take 1 tablet (25 mg total) by mouth once daily.     esomeprazole 40 MG capsule  Commonly known as: NEXIUM  Take 40 mg by mouth every morning.     famotidine 40 MG tablet  Commonly known as: PEPCID  Take 1 tablet (40 mg total) by mouth once daily.     gabapentin 300 MG capsule  Commonly known as: NEURONTIN  Take 300 mg by mouth 2 (two) times daily. and take 2 capsules at bedtime     insulin degludec 100 unit/mL (3 mL) insulin pen  Commonly known as: TRESIBA FLEXTOUCH U-100  Inject 35 Units into the skin every evening. INJECT 35 UNITS UNDER THE SKIN EVERY NIGHT AT BEDTIME     montelukast 10 mg tablet  Commonly known as: SINGULAIR  Take 1 tablet (10 mg  total) by mouth every evening.     NovoLOG Flexpen U-100 Insulin 100 unit/mL (3 mL) Inpn pen  Generic drug: insulin aspart U-100  Inject 20 Units into the skin 3 (three) times daily before meals. INJECT INJECT 20 UNITS UNDER THE SKIN 3 TIMES A DAY EACH MORNING, NOON, AND EACH EVENING BEFORE A MEAL ..not to exceed 60 units per day     semaglutide 2 mg/dose (8 mg/3 mL) Pnij  Commonly known as: OZEMPIC  Inject 2 mg into the skin every 7 days.           * This list has 2 medication(s) that are the same as other medications prescribed for you. Read the directions carefully, and ask your doctor or other care provider to review them with you.                  Indwelling Lines/Drains at time of discharge:   Lines/Drains/Airways       None                       Time spent on the discharge of patient: 45 minutes         Macho Choi MD  Department of Hospital Medicine  [facility]

## 2025-06-22 NOTE — PLAN OF CARE
Problem: Adult Inpatient Plan of Care  Goal: Plan of Care Review  Outcome: Progressing  Goal: Patient-Specific Goal (Individualized)  Outcome: Progressing  Goal: Absence of Hospital-Acquired Illness or Injury  Outcome: Progressing  Goal: Optimal Comfort and Wellbeing  Outcome: Progressing  Goal: Readiness for Transition of Care  Outcome: Progressing     Problem: Skin Injury Risk Increased  Goal: Skin Health and Integrity  Outcome: Progressing     Problem: Sepsis/Septic Shock  Goal: Optimal Coping  Outcome: Progressing  Goal: Absence of Bleeding  Outcome: Progressing  Goal: Blood Glucose Level Within Targeted Range  Outcome: Progressing  Goal: Absence of Infection Signs and Symptoms  Outcome: Progressing  Goal: Optimal Nutrition Intake  Outcome: Progressing     Problem: Diabetes Comorbidity  Goal: Blood Glucose Level Within Targeted Range  Outcome: Progressing     Problem: Wound  Goal: Optimal Coping  Outcome: Progressing  Goal: Optimal Functional Ability  Outcome: Progressing  Goal: Absence of Infection Signs and Symptoms  Outcome: Progressing  Goal: Improved Oral Intake  Outcome: Progressing  Goal: Optimal Pain Control and Function  Outcome: Progressing  Goal: Skin Health and Integrity  Outcome: Progressing  Goal: Optimal Wound Healing  Outcome: Progressing     Problem: Fall Injury Risk  Goal: Absence of Fall and Fall-Related Injury  Outcome: Progressing     Problem: Gas Exchange Impaired  Goal: Optimal Gas Exchange  Outcome: Progressing

## 2025-06-22 NOTE — NURSING
IV removed; catheter intact. Telemetry removed and patient given discharge paperwork and instructions to patient with family at bedside. Wound care instruction per Dr. Sanchez; light gauze and cleanse wound daily. Supplied patient with gauze and wound cleanser. Patient transported to vehicle via wheelchair

## 2025-06-22 NOTE — ASSESSMENT & PLAN NOTE
"R great toe infection.   Plan to go to OR for debridement.     6/21: Follow-up wound and blood cultures.  Recommended patient remain in hospital at this time.    Antibiotics (From admission, onward)      Start     Stop Route Frequency Ordered    06/22/25 1030  levoFLOXacin tablet 750 mg         -- Oral Daily 06/22/25 1023    06/21/25 2000  vancomycin (VANCOCIN) 1,750 mg in 0.9% NaCl 500 mL IVPB         -- IV Every 12 hours (non-standard times) 06/21/25 0941    06/19/25 0143  vancomycin - pharmacy to dose  ( Skin & Soft Tissue Infection: Purulent, Severe)        Placed in "And" Linked Group    -- IV pharmacy to manage frequency 06/19/25 0049            "

## 2025-06-23 NOTE — PLAN OF CARE
[facility] Telemetry  Discharge Final Note    Primary Care Provider: No primary care provider on file.    Expected Discharge Date: 6/22/2025    Final Discharge Note (most recent)       Final Note - 06/23/25 0824          Final Note    Assessment Type Final Discharge Note     Anticipated Discharge Disposition Home or Self Care     What phone number can be called within the next 1-3 days to see how you are doing after discharge? [phone]        Post-Acute Status    Discharge Delays None known at this time                     Contact Info       Montana Fitch DO   Specialty: General Surgery, Surgery    [address] [facility]  [facility] [address]   Phone: [phone]       Next Steps: Follow up in 2 week(s)    Instructions: hospital follow-up for foot wound s/p debridement.          Pt dc home with family. No further needs noted

## 2025-06-24 LAB
BACTERIA BLD CULT: NORMAL
OHS QRS DURATION: 76 MS
OHS QTC CALCULATION: 442 MS

## 2025-06-25 LAB
BACTERIA SPEC ANAEROBE CULT: ABNORMAL
ESTROGEN SERPL-MCNC: NORMAL PG/ML
INSULIN SERPL-ACNC: NORMAL U[IU]/ML
LAB AP GROSS DESCRIPTION: NORMAL
LAB AP LABORATORY NOTES: NORMAL
T3RU NFR SERPL: NORMAL %

## 2025-06-26 ENCOUNTER — HOSPITAL ENCOUNTER (EMERGENCY)
Facility: HOSPITAL | Age: 45
Discharge: HOME | End: 2025-06-26
Payer: COMMERCIAL

## 2025-06-26 VITALS
DIASTOLIC BLOOD PRESSURE: 102 MMHG | HEIGHT: 65 IN | OXYGEN SATURATION: 98 % | WEIGHT: 189 LBS | SYSTOLIC BLOOD PRESSURE: 138 MMHG | RESPIRATION RATE: 18 BRPM | TEMPERATURE: 98 F | HEART RATE: 102 BPM | BODY MASS INDEX: 31.49 KG/M2

## 2025-06-26 DIAGNOSIS — T81.30XA WOUND DEHISCENCE: Primary | ICD-10-CM

## 2025-06-26 PROCEDURE — 99283 EMERGENCY DEPT VISIT LOW MDM: CPT

## 2025-06-26 PROCEDURE — 25000003 PHARM REV CODE 250

## 2025-06-26 RX ORDER — MUPIROCIN 20 MG/G
1 OINTMENT TOPICAL
Status: COMPLETED | OUTPATIENT
Start: 2025-06-26 | End: 2025-06-26

## 2025-06-26 RX ADMIN — MUPIROCIN 1 TUBE: 20 OINTMENT TOPICAL at 09:06

## 2025-06-27 NOTE — ED TRIAGE NOTES
Patient had a recent toe amputation with defatta and states her foot swelled and ripped all the stiches out.

## 2025-06-27 NOTE — DISCHARGE INSTRUCTIONS
- a referral to Dr. Fitch's office has been made for you.  - They will contact you for appointment time and date.

## 2025-06-27 NOTE — ED PROVIDER NOTES
Encounter Date: 2025       History     Chief Complaint   Patient presents with    Wound Dehiscence     Right big toe      EP is a 45 y/o WF with a PMHx of Asthma, DM< Digestive disorder, HTN, Neuropathy, Scoliosis, Seizures, and Osteoarthritis presents to the ED POV with c/o wound dehiscence. Patient has her right great toe amputated on 2024 due to osteomyelitis. Patient stated that she was coming back from the bathroom when she felt a burning sensation. Patient's wound dehisced close to her second toe. No purulent or bloody discharge noted. No erythema noted. Patient has good pedal pulses.       The history is provided by the patient.     Review of patient's allergies indicates:   Allergen Reactions    Clindamycin Anaphylaxis    Penicillins Swelling    Amoxicillin     Bactrim [sulfamethoxazole-trimethoprim]     Cyclobenzaprine     Levofloxacin Hives    Levomenol analogues     Morphine     Opioids - morphine analogues     Promethazine     Toradol [ketorolac]     Tramadol      Past Medical History:   Diagnosis Date    Asthma     Diabetes mellitus     Digestive disorder     Hypertension     Neuropathy     Scoliosis     Seizures     Unspecified osteoarthritis, unspecified site      Past Surgical History:   Procedure Laterality Date     SECTION      DILATION AND CURETTAGE OF UTERUS      HYSTERECTOMY      OPEN REDUCTION AND INTERNAL FIXATION (ORIF) OF FRACTURE OF METATARSAL BONE Right 2024    Procedure: ORIF, FRACTURE, RIGHT 5TH METATARSAL BONE;  Surgeon: Wilton Davidson MD;  Location: [facility] OR;  Service: Orthopedics;  Laterality: Right;    TOE AMPUTATION Right 2025    Procedure: AMPUTATION, TOE;  Surgeon: Montana Fitch DO;  Location: [facility] OR;  Service: General;  Laterality: Right;     No family history on file.  Social History[1]  Review of Systems   Constitutional: Negative.    HENT: Negative.     Eyes: Negative.    Respiratory: Negative.     Cardiovascular: Negative.     Gastrointestinal: Negative.    Endocrine: Negative.    Genitourinary: Negative.    Musculoskeletal: Negative.    Skin:  Positive for wound.        Surgical site dehiscence s/p amputation of right great toe.    Allergic/Immunologic: Negative.    Neurological: Negative.    Hematological: Negative.    Psychiatric/Behavioral: Negative.         Physical Exam     Initial Vitals [06/26/25 1945]   BP Pulse Resp Temp SpO2   (!) 138/102 102 18 98.1 °F (36.7 °C) 98 %      MAP       --         Physical Exam    Nursing note and vitals reviewed.  Constitutional: Vital signs are normal. She appears well-developed and well-nourished. She is not diaphoretic. She is cooperative.  Non-toxic appearance. She does not have a sickly appearance. She does not appear ill. No distress.   Cardiovascular:  Normal rate, regular rhythm, S1 normal, S2 normal, normal heart sounds, intact distal pulses and normal pulses.           Pulmonary/Chest: Effort normal and breath sounds normal.   Musculoskeletal:        Legs:      Neurological: She is alert and oriented to person, place, and time. She has normal strength and normal reflexes. She displays normal reflexes. No cranial nerve deficit or sensory deficit. She displays a negative Romberg sign. GCS eye subscore is 4. GCS verbal subscore is 5. GCS motor subscore is 6.   Skin: Skin is warm, dry and intact. Capillary refill takes less than 2 seconds. No rash noted.   Psychiatric: She has a normal mood and affect. Her speech is normal and behavior is normal. Judgment and thought content normal. Cognition and memory are normal.         Medical Screening Exam   See Full Note    ED Course   Procedures  Labs Reviewed - No data to display       Imaging Results    None          Medications   mupirocin 2 % ointment 1 Tube (1 Tube Topical (Top) Given 6/26/25 2145)     Medical Decision Making  EP is a 45 y/o WF with a PMHx of Asthma, DM< Digestive disorder, HTN, Neuropathy, Scoliosis, Seizures, and  Osteoarthritis presents to the ED POV with c/o wound dehiscence. Patient has her right great toe amputated on 6/19/2024 due to osteomyelitis. Patient stated that she was coming back from the bathroom when she felt a burning sensation. Patient's wound dehisced close to her second toe. No purulent or bloody discharge noted. No erythema noted. Patient has good pedal pulses.       The history is provided by the patient.       Amount and/or Complexity of Data Reviewed  Discussion of management or test interpretation with external provider(s): Dr. Fitch     Risk  Prescription drug management.  Risk Details: Low                                      Clinical Impression:   Final diagnoses:  [T81.30XA] Wound dehiscence (Primary)        ED Disposition Condition    Discharge Stable          ED Prescriptions    None       Follow-up Information       Follow up With Specialties Details Why Contact Info    Montana Fitch, DO General Surgery, Surgery In 1 day  [address] [facility] [address]  [phone]                 [1]   Social History  Tobacco Use    Smoking status: Former     Types: Cigarettes     Start date: 1999     Passive exposure: Past    Smokeless tobacco: Never   Substance Use Topics    Alcohol use: Not Currently    Drug use: Never        Shalom Bagley FNP  06/26/25 2144

## 2025-07-15 ENCOUNTER — TELEPHONE (OUTPATIENT)
Dept: SURGERY | Facility: CLINIC | Age: 45
End: 2025-07-15
Payer: COMMERCIAL

## 2025-07-15 ENCOUNTER — OFFICE VISIT (OUTPATIENT)
Dept: SURGERY | Facility: CLINIC | Age: 45
End: 2025-07-15
Payer: COMMERCIAL

## 2025-07-15 VITALS — HEIGHT: 65 IN | BODY MASS INDEX: 31.45 KG/M2

## 2025-07-15 DIAGNOSIS — T81.30XA WOUND DEHISCENCE: ICD-10-CM

## 2025-07-15 PROCEDURE — 99024 POSTOP FOLLOW-UP VISIT: CPT | Mod: ,,, | Performed by: STUDENT IN AN ORGANIZED HEALTH CARE EDUCATION/TRAINING PROGRAM

## 2025-07-15 PROCEDURE — 1159F MED LIST DOCD IN RCRD: CPT | Mod: CPTII,,, | Performed by: STUDENT IN AN ORGANIZED HEALTH CARE EDUCATION/TRAINING PROGRAM

## 2025-07-15 PROCEDURE — 99213 OFFICE O/P EST LOW 20 MIN: CPT | Mod: PBBFAC | Performed by: STUDENT IN AN ORGANIZED HEALTH CARE EDUCATION/TRAINING PROGRAM

## 2025-07-15 PROCEDURE — 99999 PR PBB SHADOW E&M-EST. PATIENT-LVL III: CPT | Mod: PBBFAC,,, | Performed by: STUDENT IN AN ORGANIZED HEALTH CARE EDUCATION/TRAINING PROGRAM

## 2025-07-15 RX ORDER — AZITHROMYCIN 500 MG/1
500 TABLET, FILM COATED ORAL DAILY
Qty: 6 TABLET | Refills: 0 | Status: SHIPPED | OUTPATIENT
Start: 2025-07-15 | End: 2025-07-25

## 2025-07-15 NOTE — PROGRESS NOTES
45-year-old  female presents the clinic for postop evaluation status post amputation of the right great toe.  Patient states she had swelling after her surgery, which was done on June 25th and a few sutures pop.  The wound had some partial dehiscence.  She has been cleaning the area with Vashe and placing mupirocin ointment.  Patient states it did get to wet and she started allow it to air dry.  Denies any chest pain/shortness of breath, nausea/vomiting/diarrhea, fever/chills.    Incision in place, a few sutures in place.  Small 3 mm dehiscence across the wound with scab formation.  We cleaned this off with a 4 x 4 and removed a couple of the sutures that were partially out.  We placed Steri-Strips to loosely approximate.  Patient to continue to wash and clean area with Vashe and allow to air dry at night.  We will place on azithromycin antibiotic by mouth.  Patient to follow back up in 2 weeks

## 2025-07-15 NOTE — TELEPHONE ENCOUNTER
Copied from CRM #[id number]. Topic: Medications - Pharmacy  >> Jul 15, 2025  2:01 PM Med Assistant Leonor wrote:  [facility]     Pharmacy is calling to request assistance with Rx    Pharmacy name and phone number:   Pharmacy contact: [phone]  Patient Name:   Prescription Name: azithromycin (ZITHROMAX) 500 MG tablet  What do they need to clarify? Quantity doesn't match         Preferred Method of Contact: Phone Call  Patient's Preferred Phone Number on File: [phone]   Best Call Back Number, if different:  Additional Information:

## 2025-07-31 ENCOUNTER — TELEPHONE (OUTPATIENT)
Dept: SURGERY | Facility: CLINIC | Age: 45
End: 2025-07-31
Payer: COMMERCIAL

## 2025-07-31 NOTE — TELEPHONE ENCOUNTER
Pt declined to R/S appt with Dr. Fitch due to not being able to pay out of pocket since we do not accept her insurance, pt did state that incisions were healing good with no complications, instructed pt to reach out to our office if she has any questions/concerns, pt voiced understanding.

## 2025-08-04 ENCOUNTER — TELEPHONE (OUTPATIENT)
Dept: SURGERY | Facility: CLINIC | Age: 45
End: 2025-08-04
Payer: COMMERCIAL

## (undated) DEVICE — BANDAGE ESMARK 6INX3YD

## (undated) DEVICE — GOWN NONREINF SET-IN SLV 2XL

## (undated) DEVICE — SUT ETHILON 2-0 FS 18IN BLK

## (undated) DEVICE — CAST LARGE OR FROM CAST CART

## (undated) DEVICE — GLOVE SENSICARE PI GRN 8.5

## (undated) DEVICE — COLLECTOR SPECIMEN ANAEROBIC

## (undated) DEVICE — GLOVE SENSICARE PI SURG 8

## (undated) DEVICE — Device

## (undated) DEVICE — SOL NACL IRR 1000ML BTL

## (undated) DEVICE — SUT 2-0 VICRYL / CT-1

## (undated) DEVICE — BIT DRILL CANNULATED 3.5

## (undated) DEVICE — APPLICATOR CHLORAPREP ORN 26ML

## (undated) DEVICE — PAD CAST SPECIALIST STRL 3

## (undated) DEVICE — GLOVE SENSICARE PI SURG 6.5

## (undated) DEVICE — BAG RECTANGLE RBBRBND 30X36IN

## (undated) DEVICE — SUT VICRYL 3-0 27 SH

## (undated) DEVICE — DERM CURETTE 5MM DISP

## (undated) DEVICE — TOURNIQUET SB QC SP 34X4IN

## (undated) DEVICE — SPONGE COTTON TRAY 4X4IN

## (undated) DEVICE — TRAY SKIN SCRUB WET PREMIUM

## (undated) DEVICE — GLOVE SENSICARE PI GRN 7

## (undated) DEVICE — IMPLANTABLE DEVICE
Type: IMPLANTABLE DEVICE | Site: FOOT | Status: NON-FUNCTIONAL
Removed: 2024-09-27

## (undated) DEVICE — GLOVE SENSICARE PI GRN 6.5

## (undated) DEVICE — GLOVE BIOGEL PIMICRO INDIC 8.5

## (undated) DEVICE — GLOVE SENSICARE PI SURG 8.5

## (undated) DEVICE — SWAB AEROBIC CULTURETTE

## (undated) DEVICE — STOCKINETTE TUBULAR 2PL 6 X 4

## (undated) DEVICE — ELECTRODE BLADE INSULATED 1 IN

## (undated) DEVICE — 5.0MM DRILL BIT

## (undated) DEVICE — SUT ETHILON 3-0 PS2 18 BLK